# Patient Record
Sex: FEMALE | Race: WHITE | NOT HISPANIC OR LATINO | Employment: FULL TIME
[De-identification: names, ages, dates, MRNs, and addresses within clinical notes are randomized per-mention and may not be internally consistent; named-entity substitution may affect disease eponyms.]

---

## 2017-01-30 DIAGNOSIS — Z79.899 ENCOUNTER FOR LONG-TERM CURRENT USE OF MEDICATION: ICD-10-CM

## 2017-01-30 DIAGNOSIS — D84.9 IMMUNOSUPPRESSED STATUS (H): ICD-10-CM

## 2017-01-30 DIAGNOSIS — Z94.0 KIDNEY REPLACED BY TRANSPLANT: ICD-10-CM

## 2017-01-30 DIAGNOSIS — Z79.60 LONG-TERM USE OF IMMUNOSUPPRESSANT MEDICATION: ICD-10-CM

## 2017-01-30 DIAGNOSIS — Z94.0 RENAL TRANSPLANT RECIPIENT: ICD-10-CM

## 2017-01-30 DIAGNOSIS — Z48.298 AFTERCARE FOLLOWING ORGAN TRANSPLANT: ICD-10-CM

## 2017-01-30 LAB
ANION GAP SERPL CALCULATED.3IONS-SCNC: 7 MMOL/L (ref 3–14)
BUN SERPL-MCNC: 15 MG/DL (ref 7–30)
CALCIUM SERPL-MCNC: 9.2 MG/DL (ref 8.5–10.1)
CHLORIDE SERPL-SCNC: 105 MMOL/L (ref 94–109)
CO2 SERPL-SCNC: 25 MMOL/L (ref 20–32)
CREAT SERPL-MCNC: 0.72 MG/DL (ref 0.52–1.04)
ERYTHROCYTE [DISTWIDTH] IN BLOOD BY AUTOMATED COUNT: 12.8 % (ref 10–15)
GFR SERPL CREATININE-BSD FRML MDRD: 82 ML/MIN/1.7M2
GLUCOSE SERPL-MCNC: 104 MG/DL (ref 70–99)
HCT VFR BLD AUTO: 40.4 % (ref 35–47)
HGB BLD-MCNC: 13.4 G/DL (ref 11.7–15.7)
MCH RBC QN AUTO: 28.2 PG (ref 26.5–33)
MCHC RBC AUTO-ENTMCNC: 33.2 G/DL (ref 31.5–36.5)
MCV RBC AUTO: 85 FL (ref 78–100)
PLATELET # BLD AUTO: 104 10E9/L (ref 150–450)
POTASSIUM SERPL-SCNC: 3.8 MMOL/L (ref 3.4–5.3)
RBC # BLD AUTO: 4.75 10E12/L (ref 3.8–5.2)
SODIUM SERPL-SCNC: 137 MMOL/L (ref 133–144)
WBC # BLD AUTO: 3.3 10E9/L (ref 4–11)

## 2017-01-30 PROCEDURE — 85027 COMPLETE CBC AUTOMATED: CPT | Performed by: INTERNAL MEDICINE

## 2017-01-30 PROCEDURE — 36415 COLL VENOUS BLD VENIPUNCTURE: CPT | Performed by: INTERNAL MEDICINE

## 2017-01-30 PROCEDURE — 80048 BASIC METABOLIC PNL TOTAL CA: CPT | Performed by: INTERNAL MEDICINE

## 2017-01-30 PROCEDURE — 80197 ASSAY OF TACROLIMUS: CPT | Performed by: INTERNAL MEDICINE

## 2017-01-31 LAB
TACROLIMUS BLD-MCNC: 4.6 UG/L (ref 5–15)
TME LAST DOSE: ABNORMAL H

## 2017-03-08 DIAGNOSIS — Z94.0 RENAL TRANSPLANT RECIPIENT: ICD-10-CM

## 2017-03-08 DIAGNOSIS — Z94.0 KIDNEY REPLACED BY TRANSPLANT: ICD-10-CM

## 2017-03-08 DIAGNOSIS — Z48.298 AFTERCARE FOLLOWING ORGAN TRANSPLANT: ICD-10-CM

## 2017-03-08 DIAGNOSIS — Z79.60 LONG-TERM USE OF IMMUNOSUPPRESSANT MEDICATION: ICD-10-CM

## 2017-03-08 DIAGNOSIS — D84.9 IMMUNOSUPPRESSED STATUS (H): ICD-10-CM

## 2017-03-08 DIAGNOSIS — Z79.899 ENCOUNTER FOR LONG-TERM CURRENT USE OF MEDICATION: ICD-10-CM

## 2017-03-08 LAB
ANION GAP SERPL CALCULATED.3IONS-SCNC: 7 MMOL/L (ref 3–14)
BUN SERPL-MCNC: 14 MG/DL (ref 7–30)
CALCIUM SERPL-MCNC: 9.3 MG/DL (ref 8.5–10.1)
CHLORIDE SERPL-SCNC: 104 MMOL/L (ref 94–109)
CO2 SERPL-SCNC: 30 MMOL/L (ref 20–32)
CREAT SERPL-MCNC: 0.81 MG/DL (ref 0.52–1.04)
ERYTHROCYTE [DISTWIDTH] IN BLOOD BY AUTOMATED COUNT: 13.2 % (ref 10–15)
GFR SERPL CREATININE-BSD FRML MDRD: 71 ML/MIN/1.7M2
GLUCOSE SERPL-MCNC: 115 MG/DL (ref 70–99)
HCT VFR BLD AUTO: 43.6 % (ref 35–47)
HGB BLD-MCNC: 14.3 G/DL (ref 11.7–15.7)
MCH RBC QN AUTO: 28.3 PG (ref 26.5–33)
MCHC RBC AUTO-ENTMCNC: 32.8 G/DL (ref 31.5–36.5)
MCV RBC AUTO: 86 FL (ref 78–100)
PLATELET # BLD AUTO: 108 10E9/L (ref 150–450)
POTASSIUM SERPL-SCNC: 4.2 MMOL/L (ref 3.4–5.3)
RBC # BLD AUTO: 5.06 10E12/L (ref 3.8–5.2)
SODIUM SERPL-SCNC: 141 MMOL/L (ref 133–144)
TACROLIMUS BLD-MCNC: 5.2 UG/L (ref 5–15)
TME LAST DOSE: NORMAL H
WBC # BLD AUTO: 3.6 10E9/L (ref 4–11)

## 2017-03-08 PROCEDURE — 36415 COLL VENOUS BLD VENIPUNCTURE: CPT | Performed by: INTERNAL MEDICINE

## 2017-03-08 PROCEDURE — 80048 BASIC METABOLIC PNL TOTAL CA: CPT | Performed by: INTERNAL MEDICINE

## 2017-03-08 PROCEDURE — 80197 ASSAY OF TACROLIMUS: CPT | Performed by: INTERNAL MEDICINE

## 2017-03-08 PROCEDURE — 85027 COMPLETE CBC AUTOMATED: CPT | Performed by: INTERNAL MEDICINE

## 2017-06-03 ENCOUNTER — HEALTH MAINTENANCE LETTER (OUTPATIENT)
Age: 62
End: 2017-06-03

## 2017-06-12 DIAGNOSIS — Z48.298 AFTERCARE FOLLOWING ORGAN TRANSPLANT: ICD-10-CM

## 2017-06-12 DIAGNOSIS — D84.9 IMMUNOSUPPRESSED STATUS (H): ICD-10-CM

## 2017-06-12 DIAGNOSIS — Z79.60 LONG-TERM USE OF IMMUNOSUPPRESSANT MEDICATION: ICD-10-CM

## 2017-06-12 DIAGNOSIS — Z94.0 KIDNEY REPLACED BY TRANSPLANT: ICD-10-CM

## 2017-06-12 DIAGNOSIS — Z79.899 ENCOUNTER FOR LONG-TERM CURRENT USE OF MEDICATION: ICD-10-CM

## 2017-06-12 DIAGNOSIS — Z94.0 RENAL TRANSPLANT RECIPIENT: ICD-10-CM

## 2017-06-12 LAB
ANION GAP SERPL CALCULATED.3IONS-SCNC: 6 MMOL/L (ref 3–14)
BUN SERPL-MCNC: 15 MG/DL (ref 7–30)
CALCIUM SERPL-MCNC: 8.8 MG/DL (ref 8.5–10.1)
CHLORIDE SERPL-SCNC: 106 MMOL/L (ref 94–109)
CO2 SERPL-SCNC: 27 MMOL/L (ref 20–32)
CREAT SERPL-MCNC: 0.71 MG/DL (ref 0.52–1.04)
CREAT UR-MCNC: 67 MG/DL
ERYTHROCYTE [DISTWIDTH] IN BLOOD BY AUTOMATED COUNT: 13 % (ref 10–15)
GFR SERPL CREATININE-BSD FRML MDRD: 84 ML/MIN/1.7M2
GLUCOSE SERPL-MCNC: 97 MG/DL (ref 70–99)
HCT VFR BLD AUTO: 41.4 % (ref 35–47)
HGB BLD-MCNC: 13.7 G/DL (ref 11.7–15.7)
MCH RBC QN AUTO: 28.6 PG (ref 26.5–33)
MCHC RBC AUTO-ENTMCNC: 33.1 G/DL (ref 31.5–36.5)
MCV RBC AUTO: 86 FL (ref 78–100)
PLATELET # BLD AUTO: 100 10E9/L (ref 150–450)
POTASSIUM SERPL-SCNC: 3.9 MMOL/L (ref 3.4–5.3)
PROT UR-MCNC: 0.1 G/L
PROT/CREAT 24H UR: 0.15 G/G CR (ref 0–0.2)
RBC # BLD AUTO: 4.79 10E12/L (ref 3.8–5.2)
SODIUM SERPL-SCNC: 139 MMOL/L (ref 133–144)
TACROLIMUS BLD-MCNC: 4 UG/L (ref 5–15)
TME LAST DOSE: ABNORMAL H
WBC # BLD AUTO: 3 10E9/L (ref 4–11)

## 2017-06-12 PROCEDURE — 80048 BASIC METABOLIC PNL TOTAL CA: CPT | Performed by: INTERNAL MEDICINE

## 2017-06-12 PROCEDURE — 80197 ASSAY OF TACROLIMUS: CPT | Performed by: INTERNAL MEDICINE

## 2017-06-12 PROCEDURE — 85027 COMPLETE CBC AUTOMATED: CPT | Performed by: INTERNAL MEDICINE

## 2017-06-12 PROCEDURE — 84156 ASSAY OF PROTEIN URINE: CPT | Performed by: INTERNAL MEDICINE

## 2017-06-12 PROCEDURE — 36415 COLL VENOUS BLD VENIPUNCTURE: CPT | Performed by: INTERNAL MEDICINE

## 2017-06-16 DIAGNOSIS — I10 ESSENTIAL HYPERTENSION WITH GOAL BLOOD PRESSURE LESS THAN 130/80: ICD-10-CM

## 2017-06-16 RX ORDER — AMLODIPINE BESYLATE 5 MG/1
5 TABLET ORAL DAILY
Qty: 90 TABLET | Refills: 3 | Status: SHIPPED | OUTPATIENT
Start: 2017-06-16 | End: 2018-06-20

## 2017-06-16 NOTE — TELEPHONE ENCOUNTER
Last Office Visit with Nephrologist:  9/29/16.  Medication refilled per Nephrology Clinic protocol.     Taryn Page RN

## 2017-08-14 ENCOUNTER — TELEPHONE (OUTPATIENT)
Dept: NEPHROLOGY | Facility: CLINIC | Age: 62
End: 2017-08-14

## 2017-08-14 NOTE — TELEPHONE ENCOUNTER
Spoke with patient. Said she's been great since last appointment, denied any symptoms or concerns. She will have her labs drawn the day before her appointment.    Taryn Page RN

## 2017-08-21 DIAGNOSIS — Z94.0 KIDNEY REPLACED BY TRANSPLANT: ICD-10-CM

## 2017-08-21 DIAGNOSIS — D84.9 IMMUNOSUPPRESSED STATUS (H): ICD-10-CM

## 2017-08-21 DIAGNOSIS — Z48.298 AFTERCARE FOLLOWING ORGAN TRANSPLANT: ICD-10-CM

## 2017-08-21 DIAGNOSIS — Z79.60 LONG-TERM USE OF IMMUNOSUPPRESSANT MEDICATION: ICD-10-CM

## 2017-08-21 DIAGNOSIS — Z79.899 ENCOUNTER FOR LONG-TERM CURRENT USE OF MEDICATION: ICD-10-CM

## 2017-08-21 DIAGNOSIS — Z94.0 RENAL TRANSPLANT RECIPIENT: ICD-10-CM

## 2017-08-21 LAB
ANION GAP SERPL CALCULATED.3IONS-SCNC: 2 MMOL/L (ref 3–14)
BUN SERPL-MCNC: 14 MG/DL (ref 7–30)
CALCIUM SERPL-MCNC: 9.2 MG/DL (ref 8.5–10.1)
CHLORIDE SERPL-SCNC: 109 MMOL/L (ref 94–109)
CO2 SERPL-SCNC: 31 MMOL/L (ref 20–32)
CREAT SERPL-MCNC: 0.72 MG/DL (ref 0.52–1.04)
ERYTHROCYTE [DISTWIDTH] IN BLOOD BY AUTOMATED COUNT: 12.9 % (ref 10–15)
GFR SERPL CREATININE-BSD FRML MDRD: 82 ML/MIN/1.7M2
GLUCOSE SERPL-MCNC: 89 MG/DL (ref 70–99)
HCT VFR BLD AUTO: 39.8 % (ref 35–47)
HGB BLD-MCNC: 13.3 G/DL (ref 11.7–15.7)
MCH RBC QN AUTO: 28.8 PG (ref 26.5–33)
MCHC RBC AUTO-ENTMCNC: 33.4 G/DL (ref 31.5–36.5)
MCV RBC AUTO: 86 FL (ref 78–100)
PLATELET # BLD AUTO: 85 10E9/L (ref 150–450)
POTASSIUM SERPL-SCNC: 4.3 MMOL/L (ref 3.4–5.3)
RBC # BLD AUTO: 4.62 10E12/L (ref 3.8–5.2)
SODIUM SERPL-SCNC: 142 MMOL/L (ref 133–144)
TACROLIMUS BLD-MCNC: 3.9 UG/L (ref 5–15)
TME LAST DOSE: ABNORMAL H
WBC # BLD AUTO: 3.3 10E9/L (ref 4–11)

## 2017-08-21 PROCEDURE — 85027 COMPLETE CBC AUTOMATED: CPT | Performed by: INTERNAL MEDICINE

## 2017-08-21 PROCEDURE — 80197 ASSAY OF TACROLIMUS: CPT | Performed by: INTERNAL MEDICINE

## 2017-08-21 PROCEDURE — 80048 BASIC METABOLIC PNL TOTAL CA: CPT | Performed by: INTERNAL MEDICINE

## 2017-08-21 PROCEDURE — 36415 COLL VENOUS BLD VENIPUNCTURE: CPT | Performed by: INTERNAL MEDICINE

## 2017-08-22 ENCOUNTER — OFFICE VISIT (OUTPATIENT)
Dept: NEPHROLOGY | Facility: CLINIC | Age: 62
End: 2017-08-22
Attending: INTERNAL MEDICINE
Payer: COMMERCIAL

## 2017-08-22 ENCOUNTER — RECORDS - HEALTHEAST (OUTPATIENT)
Dept: ADMINISTRATIVE | Facility: OTHER | Age: 62
End: 2017-08-22

## 2017-08-22 VITALS
SYSTOLIC BLOOD PRESSURE: 145 MMHG | RESPIRATION RATE: 18 BRPM | DIASTOLIC BLOOD PRESSURE: 82 MMHG | WEIGHT: 126.8 LBS | HEART RATE: 85 BPM | HEIGHT: 60 IN | BODY MASS INDEX: 24.9 KG/M2 | TEMPERATURE: 98.3 F

## 2017-08-22 DIAGNOSIS — Z94.0 KIDNEY REPLACED BY TRANSPLANT: Primary | ICD-10-CM

## 2017-08-22 DIAGNOSIS — I15.1 HYPERTENSION SECONDARY TO OTHER RENAL DISORDERS: ICD-10-CM

## 2017-08-22 DIAGNOSIS — E55.9 VITAMIN D DEFICIENCY: ICD-10-CM

## 2017-08-22 DIAGNOSIS — Z79.60 LONG-TERM USE OF IMMUNOSUPPRESSANT MEDICATION: ICD-10-CM

## 2017-08-22 DIAGNOSIS — Z48.298 AFTERCARE FOLLOWING ORGAN TRANSPLANT: ICD-10-CM

## 2017-08-22 DIAGNOSIS — D84.9 IMMUNOSUPPRESSED STATUS (H): ICD-10-CM

## 2017-08-22 DIAGNOSIS — D69.6 THROMBOCYTOPENIA (H): ICD-10-CM

## 2017-08-22 DIAGNOSIS — Z94.0 KIDNEY TRANSPLANT RECIPIENT: ICD-10-CM

## 2017-08-22 PROCEDURE — 99212 OFFICE O/P EST SF 10 MIN: CPT | Mod: ZF

## 2017-08-22 ASSESSMENT — PAIN SCALES - GENERAL: PAINLEVEL: NO PAIN (0)

## 2017-08-22 NOTE — TELEPHONE ENCOUNTER
Tac Level 3.9      Goal 4-6  Please phone patient and ask if Tacrolimus level was a good 12 hour trough.  If so, Increase Tacrolimus from 1.0 mg to 1.5 mg Bid  Repeat labs in 1-2 weeks.

## 2017-08-22 NOTE — TELEPHONE ENCOUNTER
Call placed to patient: No answer. Detailed message left requesting a return call to discuss understanding of instructions listed below. Will try back.

## 2017-08-22 NOTE — LETTER
8/22/2017      RE: Maritza Navarro  [address] [address]       Assessment and Plan:  1. LDKT - baseline Cr ~ 0.8-0.9, which has remained stable to improved with latest labs.  Normal proteinuria.  Low level DSA to Cw which is not felt to be very antigenic.  Will make no changer in immunosuppression.  2. HTN - well controlled at target of less than 140/90.  No changes.  3. PKD - has not had any issues or recent pain symptoms.  4. Thrombocytopenia - stable platelet count generally in 90-110s.  5. Vitamin D deficiency - previously slightly low vitamin D level and will continue on cholecalciferol.  Will recheck vitamin D level with next labs.  6. Alopecia - stable symptoms and isn't interested in making any immunosuppression changes at this time.  7. Skin cancer risk - no new skin lesions.  Recommend regular follow up with Dermatology.  8. Recommend return visit in 12 months.    Assessment and plan was discussed with patient and she voiced her understanding and agreement.    Reason for Visit:  Ms. Navarro is here for routine follow up.    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         Transplant Hx:       Tx: LDKT  Date: 9/29/15       Present Maintenance IS: Tacrolimus and Mycophenolate mofetil       Baseline Creatinine: 0.8-0.9       Recent DSA: Yes  Date last checked: 6/2016       Biopsy: No    Ms. Naavrro reports feeling good overall with minimal medical complaints.  Her energy level is good and pretty much normal.  She is active and does get some exercise, mostly with walking.  Denies any chest pain, but rare shortness of breath with exertion, mostly with climbing stairs.  Previous stress test reportedly okay.  Appetite is good and her weight has been stable.  No nausea, vomiting or diarrhea.  No fever, sweats or chills.  No leg swelling.  Her previous hair loss isn't any better, but also not any worse lately.    Home BP: 120/70s.      ROS:    A comprehensive review of systems was obtained and negative, except as noted in the HPI or PMH.    Active Medical Problems:  Patient Active Problem List   Diagnosis     Polycystic kidney disease     Hypertension secondary to other renal disorders     Vitamin D deficiency     Dyslipidemia     Polycystic liver disease     Kidney replaced by transplant     Immunosuppressed status (H)     Thrombocytopenia (H)     Steroid-induced hyperglycemia     Aftercare following organ transplant       Personal Hx:  Social History     Social History     Marital status:      Spouse name: N/A     Number of children: N/A     Years of education: N/A     Occupational History     Not on file.     Social History Main Topics     Smoking status: Never Smoker     Smokeless tobacco: Never Used     Alcohol use 0.5 oz/week     1 Standard drinks or equivalent per week     Drug use: No     Sexual activity: Not on file     Other Topics Concern     Not on file     Social History Narrative       Allergies:  Allergies   Allergen Reactions     Contrast Dye Unknown     Mold Unknown       Medications:  Prior to Admission medications    Medication Sig Start Date End Date Taking? Authorizing Provider   amLODIPine (NORVASC) 5 MG tablet Take 1 tablet (5 mg) by mouth daily 6/16/17  Yes Maxim Brooke MD   tacrolimus (PROGRAF - GENERIC EQUIVALENT) 1 MG capsule Take 1 capsule (1 mg) by mouth 2 times daily 10/18/16  Yes Anthony Hoskins MD   sulfamethoxazole-trimethoprim (BACTRIM,SEPTRA) 400-80 MG per tablet Take 1 tablet by mouth daily 10/3/16  Yes Alissa Bolden MD   mycophenolate (CELLCEPT - GENERIC EQUIVALENT) 250 MG capsule Take 4 capsules (1,000 mg) by mouth 2 times daily 9/29/16  Yes Alissa Bolden MD   aspirin 81 MG tablet Take 1 tablet (81 mg) by mouth daily 9/29/16  Yes Alissa Bolden MD   Vitamin D, Cholecalciferol, 1000 UNITS TABS Take 1,000 Units by mouth daily 10/8/15  Yes Paola Murphy MD   tacrolimus (PROGRAF -  "GENERIC EQUIVALENT) 0.5 MG capsule HOLD  Patient not taking: Reported on 8/22/2017 10/18/16   Anthony Hoskins MD       Vitals:  /82  Pulse 85  Temp 98.3  F (36.8  C) (Oral)  Resp 18  Ht 1.53 m (5' 0.25\")  Wt 57.5 kg (126 lb 12.8 oz)  BMI 24.56 kg/m2    Exam:   GENERAL APPEARANCE: alert and no distress  HENT: mouth without ulcers or lesions  LYMPHATICS: no cervical or supraclavicular nodes  RESP: lungs clear to auscultation - no rales, rhonchi or wheezes  CV: regular rhythm, normal rate, no rub, no murmur  EDEMA: no LE edema bilaterally  ABDOMEN: soft, nondistended, nontender, bowel sounds normal  MS: extremities normal - no gross deformities noted, no evidence of inflammation in joints, no muscle tenderness  SKIN: no rash  TX KIDNEY: normal    Results:   Recent Results (from the past 168 hour(s))   Tacrolimus level    Collection Time: 08/21/17 10:00 AM   Result Value Ref Range    Tacrolimus Last Dose 2130 8/20/17     Tacrolimus Level 3.9 (L) 5.0 - 15.0 ug/L   CBC with platelets    Collection Time: 08/21/17 10:29 AM   Result Value Ref Range    WBC 3.3 (L) 4.0 - 11.0 10e9/L    RBC Count 4.62 3.8 - 5.2 10e12/L    Hemoglobin 13.3 11.7 - 15.7 g/dL    Hematocrit 39.8 35.0 - 47.0 %    MCV 86 78 - 100 fl    MCH 28.8 26.5 - 33.0 pg    MCHC 33.4 31.5 - 36.5 g/dL    RDW 12.9 10.0 - 15.0 %    Platelet Count 85 (L) 150 - 450 10e9/L   Basic metabolic panel    Collection Time: 08/21/17 10:29 AM   Result Value Ref Range    Sodium 142 133 - 144 mmol/L    Potassium 4.3 3.4 - 5.3 mmol/L    Chloride 109 94 - 109 mmol/L    Carbon Dioxide 31 20 - 32 mmol/L    Anion Gap 2 (L) 3 - 14 mmol/L    Glucose 89 70 - 99 mg/dL    Urea Nitrogen 14 7 - 30 mg/dL    Creatinine 0.72 0.52 - 1.04 mg/dL    GFR Estimate 82 >60 mL/min/1.7m2    GFR Estimate If Black >90 >60 mL/min/1.7m2    Calcium 9.2 8.5 - 10.1 mg/dL         Maxim Brooke MD      "

## 2017-08-22 NOTE — NURSING NOTE
"Chief Complaint   Patient presents with     RECHECK     Kidney follow up       Initial /82  Pulse 85  Temp 98.3  F (36.8  C) (Oral)  Resp 18  Ht 1.53 m (5' 0.25\")  Wt 57.5 kg (126 lb 12.8 oz)  BMI 24.56 kg/m2 Estimated body mass index is 24.56 kg/(m^2) as calculated from the following:    Height as of this encounter: 1.53 m (5' 0.25\").    Weight as of this encounter: 57.5 kg (126 lb 12.8 oz).  Medication Reconciliation: complete   FRANCISCA MONTELONGO CMA      "

## 2017-08-22 NOTE — MR AVS SNAPSHOT
After Visit Summary   8/22/2017    Maritza Navarro    MRN: [id number]           Patient Information     Date Of Birth          1955        Visit Information        Provider Department      8/22/2017 4:00 PM Maxim Brooke MD [facility] Nephrology        Today's Diagnoses     Kidney replaced by transplant    -  1    Vitamin D deficiency        Immunosuppressed status (H)        Aftercare following organ transplant        Thrombocytopenia (H)        Hypertension secondary to other renal disorders           Follow-ups after your visit        Additional Services     DERMATOLOGY REFERRAL       Your provider has referred you to: [facility]: [facility] [phone]   [email or web address]    Please be aware that coverage of these services is subject to the terms and limitations of your health insurance plan.  Call member services at your health plan with any benefit or coverage questions.      Please bring the following with you to your appointment:    (1) Any X-Rays, CTs or MRIs which have been performed.  Contact the facility where they were done to arrange for  prior to your scheduled appointment.    (2) List of current medications  (3) This referral request   (4) Any documents/labs given to you for this referral                  Follow-up notes from your care team     Return in about 1 year (around 8/22/2018).      Future tests that were ordered for you today     Open Future Orders        Priority Expected Expires Ordered    Vitamin D Deficiency Routine  9/21/2017 8/22/2017            Who to contact     If you have questions or need follow up information about today's clinic visit or your schedule please contact [facility] NEPHROLOGY directly at [phone].  Normal or non-critical lab and imaging results will be communicated to you by MyChart, letter or phone within 4 business days after the clinic has received the results. If you do not hear  "from us within 7 days, please contact the clinic through [employer] or phone. If you have a critical or abnormal lab result, we will notify you by phone as soon as possible.  Submit refill requests through [employer] or call your pharmacy and they will forward the refill request to us. Please allow 3 business days for your refill to be completed.          Additional Information About Your Visit        [employer] Information     [employer] gives you secure access to your electronic health record. If you see a primary care provider, you can also send messages to your care team and make appointments. If you have questions, please call your primary care clinic.  If you do not have a primary care provider, please call [phone] and they will assist you.        Care EveryWhere ID     This is your Care EveryWhere ID. This could be used by other organizations to access your [facility] medical records  [id number]        Your Vitals Were     Pulse Temperature Respirations Height BMI (Body Mass Index)       85 98.3  F (36.8  C) (Oral) 18 1.53 m (5' 0.25\") 24.56 kg/m2        Blood Pressure from Last 3 Encounters:   08/22/17 145/82   09/29/16 122/86   03/17/16 117/77    Weight from Last 3 Encounters:   08/22/17 57.5 kg (126 lb 12.8 oz)   09/29/16 56.7 kg (125 lb)   03/17/16 55.6 kg (122 lb 9.6 oz)              We Performed the Following     DERMATOLOGY REFERRAL        Primary Care Provider Office Phone # Fax #    Kathleen ALBRECHT Shahid [phone]       [facility] [address] [address]        Equal Access to Services     [facility]: Hadii aad ku hadasho Soomaali, waaxda luqadaha, qaybta kaalmada adeegjay, maine warner . So [facility] [phone].    ATENCIÓN: Si habla español, tiene a lucas disposición servicios gratuitos de asistencia lingüística. Llame al [phone].    We comply with applicable federal civil rights laws and [place] laws. We do not discriminate on the " basis of race, color, national origin, age, disability sex, sexual orientation or gender identity.            Thank you!     Thank you for choosing [facility] NEPHROLOGY  for your care. Our goal is always to provide you with excellent care. Hearing back from our patients is one way we can continue to improve our services. Please take a few minutes to complete the written survey that you may receive in the mail after your visit with us. Thank you!             Your Updated Medication List - Protect others around you: Learn how to safely use, store and throw away your medicines at www.disposemymeds.org.          This list is accurate as of: 8/22/17  4:16 PM.  Always use your most recent med list.                   Brand Name Dispense Instructions for use Diagnosis    amLODIPine 5 MG tablet    NORVASC    90 tablet    Take 1 tablet (5 mg) by mouth daily    Essential hypertension with goal blood pressure less than 130/80       aspirin 81 MG tablet     30 tablet    Take 1 tablet (81 mg) by mouth daily    Kidney replaced by transplant       mycophenolate 250 MG capsule    GENERIC EQUIVALENT    240 capsule    Take 4 capsules (1,000 mg) by mouth 2 times daily    CKD (chronic kidney disease) stage 4, GFR 15-29 ml/min (H)       sulfamethoxazole-trimethoprim 400-80 MG per tablet    BACTRIM/SEPTRA    90 tablet    Take 1 tablet by mouth daily    CKD (chronic kidney disease) stage 4, GFR 15-29 ml/min (H)       * tacrolimus 0.5 MG capsule    GENERIC EQUIVALENT    60 capsule    HOLD    Kidney transplant recipient, Long-term use of immunosuppressant medication       * tacrolimus 1 MG capsule    GENERIC EQUIVALENT    60 capsule    Take 1 capsule (1 mg) by mouth 2 times daily    Kidney transplant recipient, Long-term use of immunosuppressant medication       Vitamin D (Cholecalciferol) 1000 UNITS Tabs     90 tablet    Take 1,000 Units by mouth daily    Secondary renal hyperparathyroidism (H), Kidney replaced by transplant       * Notice:   This list has 2 medication(s) that are the same as other medications prescribed for you. Read the directions carefully, and ask your doctor or other care provider to review them with you.

## 2017-08-22 NOTE — PROGRESS NOTES
Assessment and Plan:  1. LDKT - baseline Cr ~ 0.8-0.9, which has remained stable to improved with latest labs.  Normal proteinuria.  Low level DSA to Cw which is not felt to be very antigenic.  Will make no changer in immunosuppression.  2. HTN - well controlled at target of less than 140/90.  No changes.  3. PKD - has not had any issues or recent pain symptoms.  4. Thrombocytopenia - stable platelet count generally in 90-110s.  5. Vitamin D deficiency - previously slightly low vitamin D level and will continue on cholecalciferol.  Will recheck vitamin D level with next labs.  6. Alopecia - stable symptoms and isn't interested in making any immunosuppression changes at this time.  7. Skin cancer risk - no new skin lesions.  Recommend regular follow up with Dermatology.  8. Recommend return visit in 12 months.    Assessment and plan was discussed with patient and she voiced her understanding and agreement.    Reason for Visit:  Ms. Navarro is here for routine follow up.    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         Transplant Hx:       Tx: LDKT  Date: 9/29/15       Present Maintenance IS: Tacrolimus and Mycophenolate mofetil       Baseline Creatinine: 0.8-0.9       Recent DSA: Yes  Date last checked: 6/2016       Biopsy: No    Ms. Navarro reports feeling good overall with minimal medical complaints.  Her energy level is good and pretty much normal.  She is active and does get some exercise, mostly with walking.  Denies any chest pain, but rare shortness of breath with exertion, mostly with climbing stairs.  Previous stress test reportedly okay.  Appetite is good and her weight has been stable.  No nausea, vomiting or diarrhea.  No fever, sweats or chills.  No leg swelling.  Her previous hair loss isn't any better, but also not any worse lately.    Home BP: 120/70s.      ROS:   A comprehensive review of systems was obtained and negative, except as noted in  the HPI or PMH.    Active Medical Problems:  Patient Active Problem List   Diagnosis     Polycystic kidney disease     Hypertension secondary to other renal disorders     Vitamin D deficiency     Dyslipidemia     Polycystic liver disease     Kidney replaced by transplant     Immunosuppressed status (H)     Thrombocytopenia (H)     Steroid-induced hyperglycemia     Aftercare following organ transplant       Personal Hx:  Social History     Social History     Marital status:      Spouse name: N/A     Number of children: N/A     Years of education: N/A     Occupational History     Not on file.     Social History Main Topics     Smoking status: Never Smoker     Smokeless tobacco: Never Used     Alcohol use 0.5 oz/week     1 Standard drinks or equivalent per week     Drug use: No     Sexual activity: Not on file     Other Topics Concern     Not on file     Social History Narrative       Allergies:  Allergies   Allergen Reactions     Contrast Dye Unknown     Mold Unknown       Medications:  Prior to Admission medications    Medication Sig Start Date End Date Taking? Authorizing Provider   amLODIPine (NORVASC) 5 MG tablet Take 1 tablet (5 mg) by mouth daily 6/16/17  Yes Maxim Brooke MD   tacrolimus (PROGRAF - GENERIC EQUIVALENT) 1 MG capsule Take 1 capsule (1 mg) by mouth 2 times daily 10/18/16  Yes Anthony Hoskins MD   sulfamethoxazole-trimethoprim (BACTRIM,SEPTRA) 400-80 MG per tablet Take 1 tablet by mouth daily 10/3/16  Yes Alissa Bolden MD   mycophenolate (CELLCEPT - GENERIC EQUIVALENT) 250 MG capsule Take 4 capsules (1,000 mg) by mouth 2 times daily 9/29/16  Yes Alissa Bolden MD   aspirin 81 MG tablet Take 1 tablet (81 mg) by mouth daily 9/29/16  Yes Alissa Bolden MD   Vitamin D, Cholecalciferol, 1000 UNITS TABS Take 1,000 Units by mouth daily 10/8/15  Yes Paola Murphy MD   tacrolimus (PROGRAF - GENERIC EQUIVALENT) 0.5 MG capsule HOLD  Patient not taking: Reported on  "8/22/2017 10/18/16   Anthony Hoskins MD       Vitals:  /82  Pulse 85  Temp 98.3  F (36.8  C) (Oral)  Resp 18  Ht 1.53 m (5' 0.25\")  Wt 57.5 kg (126 lb 12.8 oz)  BMI 24.56 kg/m2    Exam:   GENERAL APPEARANCE: alert and no distress  HENT: mouth without ulcers or lesions  LYMPHATICS: no cervical or supraclavicular nodes  RESP: lungs clear to auscultation - no rales, rhonchi or wheezes  CV: regular rhythm, normal rate, no rub, no murmur  EDEMA: no LE edema bilaterally  ABDOMEN: soft, nondistended, nontender, bowel sounds normal  MS: extremities normal - no gross deformities noted, no evidence of inflammation in joints, no muscle tenderness  SKIN: no rash  TX KIDNEY: normal    Results:   Recent Results (from the past 168 hour(s))   Tacrolimus level    Collection Time: 08/21/17 10:00 AM   Result Value Ref Range    Tacrolimus Last Dose 2130 8/20/17     Tacrolimus Level 3.9 (L) 5.0 - 15.0 ug/L   CBC with platelets    Collection Time: 08/21/17 10:29 AM   Result Value Ref Range    WBC 3.3 (L) 4.0 - 11.0 10e9/L    RBC Count 4.62 3.8 - 5.2 10e12/L    Hemoglobin 13.3 11.7 - 15.7 g/dL    Hematocrit 39.8 35.0 - 47.0 %    MCV 86 78 - 100 fl    MCH 28.8 26.5 - 33.0 pg    MCHC 33.4 31.5 - 36.5 g/dL    RDW 12.9 10.0 - 15.0 %    Platelet Count 85 (L) 150 - 450 10e9/L   Basic metabolic panel    Collection Time: 08/21/17 10:29 AM   Result Value Ref Range    Sodium 142 133 - 144 mmol/L    Potassium 4.3 3.4 - 5.3 mmol/L    Chloride 109 94 - 109 mmol/L    Carbon Dioxide 31 20 - 32 mmol/L    Anion Gap 2 (L) 3 - 14 mmol/L    Glucose 89 70 - 99 mg/dL    Urea Nitrogen 14 7 - 30 mg/dL    Creatinine 0.72 0.52 - 1.04 mg/dL    GFR Estimate 82 >60 mL/min/1.7m2    GFR Estimate If Black >90 >60 mL/min/1.7m2    Calcium 9.2 8.5 - 10.1 mg/dL         "

## 2017-08-23 RX ORDER — TACROLIMUS 1 MG/1
1 CAPSULE ORAL 2 TIMES DAILY
Qty: 60 CAPSULE | Refills: 11
Start: 2017-08-23 | End: 2017-10-05

## 2017-08-23 RX ORDER — TACROLIMUS 0.5 MG/1
0.5 CAPSULE ORAL 2 TIMES DAILY
Qty: 60 CAPSULE | Refills: 11
Start: 2017-08-23 | End: 2017-10-05

## 2017-08-23 NOTE — TELEPHONE ENCOUNTER
Call placed to patient: Patients state a 13 hour lab draw and confirms current dose. Patient verbalize understanding to increase dose to 1.5 mg twice a day and repeat level in 1-2 weeks. Rx/order placed

## 2017-09-13 DIAGNOSIS — Z94.0 KIDNEY TRANSPLANTED: Primary | ICD-10-CM

## 2017-09-13 DIAGNOSIS — N18.4 CKD (CHRONIC KIDNEY DISEASE) STAGE 4, GFR 15-29 ML/MIN (H): ICD-10-CM

## 2017-09-13 RX ORDER — SULFAMETHOXAZOLE AND TRIMETHOPRIM 400; 80 MG/1; MG/1
1 TABLET ORAL DAILY
Qty: 90 TABLET | Refills: 3 | Status: SHIPPED | OUTPATIENT
Start: 2017-09-13 | End: 2018-09-04

## 2017-09-18 DIAGNOSIS — Z79.60 LONG-TERM USE OF IMMUNOSUPPRESSANT MEDICATION: ICD-10-CM

## 2017-09-18 DIAGNOSIS — Z79.899 ENCOUNTER FOR LONG-TERM CURRENT USE OF MEDICATION: ICD-10-CM

## 2017-09-18 DIAGNOSIS — Z94.0 KIDNEY REPLACED BY TRANSPLANT: ICD-10-CM

## 2017-09-18 DIAGNOSIS — D84.9 IMMUNOSUPPRESSED STATUS (H): ICD-10-CM

## 2017-09-18 DIAGNOSIS — E55.9 VITAMIN D DEFICIENCY: ICD-10-CM

## 2017-09-18 DIAGNOSIS — Z48.298 AFTERCARE FOLLOWING ORGAN TRANSPLANT: ICD-10-CM

## 2017-09-18 DIAGNOSIS — Z94.0 RENAL TRANSPLANT RECIPIENT: ICD-10-CM

## 2017-09-18 LAB
ANION GAP SERPL CALCULATED.3IONS-SCNC: 8 MMOL/L (ref 3–14)
BUN SERPL-MCNC: 15 MG/DL (ref 7–30)
CALCIUM SERPL-MCNC: 9.2 MG/DL (ref 8.5–10.1)
CHLORIDE SERPL-SCNC: 109 MMOL/L (ref 94–109)
CO2 SERPL-SCNC: 24 MMOL/L (ref 20–32)
CREAT SERPL-MCNC: 0.7 MG/DL (ref 0.52–1.04)
ERYTHROCYTE [DISTWIDTH] IN BLOOD BY AUTOMATED COUNT: 12.8 % (ref 10–15)
GFR SERPL CREATININE-BSD FRML MDRD: 84 ML/MIN/1.7M2
GLUCOSE SERPL-MCNC: 135 MG/DL (ref 70–99)
HCT VFR BLD AUTO: 39.1 % (ref 35–47)
HGB BLD-MCNC: 13.4 G/DL (ref 11.7–15.7)
MCH RBC QN AUTO: 29.2 PG (ref 26.5–33)
MCHC RBC AUTO-ENTMCNC: 34.3 G/DL (ref 31.5–36.5)
MCV RBC AUTO: 85 FL (ref 78–100)
PLATELET # BLD AUTO: 87 10E9/L (ref 150–450)
POTASSIUM SERPL-SCNC: 3.6 MMOL/L (ref 3.4–5.3)
RBC # BLD AUTO: 4.59 10E12/L (ref 3.8–5.2)
SODIUM SERPL-SCNC: 141 MMOL/L (ref 133–144)
WBC # BLD AUTO: 2.8 10E9/L (ref 4–11)

## 2017-09-18 PROCEDURE — 80197 ASSAY OF TACROLIMUS: CPT | Performed by: INTERNAL MEDICINE

## 2017-09-18 PROCEDURE — 85027 COMPLETE CBC AUTOMATED: CPT | Performed by: INTERNAL MEDICINE

## 2017-09-18 PROCEDURE — 82306 VITAMIN D 25 HYDROXY: CPT | Performed by: INTERNAL MEDICINE

## 2017-09-18 PROCEDURE — 36415 COLL VENOUS BLD VENIPUNCTURE: CPT | Performed by: INTERNAL MEDICINE

## 2017-09-18 PROCEDURE — 80048 BASIC METABOLIC PNL TOTAL CA: CPT | Performed by: INTERNAL MEDICINE

## 2017-09-19 LAB
DEPRECATED CALCIDIOL+CALCIFEROL SERPL-MC: 34 UG/L (ref 20–75)
TACROLIMUS BLD-MCNC: 5.9 UG/L (ref 5–15)
TME LAST DOSE: NORMAL H

## 2017-10-05 DIAGNOSIS — Z79.60 LONG-TERM USE OF IMMUNOSUPPRESSANT MEDICATION: ICD-10-CM

## 2017-10-05 DIAGNOSIS — Z94.0 KIDNEY TRANSPLANT RECIPIENT: ICD-10-CM

## 2017-10-05 DIAGNOSIS — N18.4 CKD (CHRONIC KIDNEY DISEASE) STAGE 4, GFR 15-29 ML/MIN (H): ICD-10-CM

## 2017-10-05 RX ORDER — MYCOPHENOLATE MOFETIL 250 MG/1
1000 CAPSULE ORAL 2 TIMES DAILY
Qty: 240 CAPSULE | Refills: 11 | Status: SHIPPED | OUTPATIENT
Start: 2017-10-05 | End: 2018-09-04

## 2017-10-05 RX ORDER — TACROLIMUS 0.5 MG/1
0.5 CAPSULE ORAL 2 TIMES DAILY
Qty: 60 CAPSULE | Refills: 11 | Status: SHIPPED | OUTPATIENT
Start: 2017-10-05 | End: 2018-06-01

## 2017-10-05 RX ORDER — TACROLIMUS 1 MG/1
1 CAPSULE ORAL 2 TIMES DAILY
Qty: 60 CAPSULE | Refills: 11 | Status: SHIPPED | OUTPATIENT
Start: 2017-10-05 | End: 2017-10-09

## 2017-10-09 DIAGNOSIS — Z79.60 LONG-TERM USE OF IMMUNOSUPPRESSANT MEDICATION: ICD-10-CM

## 2017-10-09 DIAGNOSIS — Z94.0 KIDNEY TRANSPLANT RECIPIENT: ICD-10-CM

## 2017-10-09 RX ORDER — TACROLIMUS 1 MG/1
1 CAPSULE ORAL 2 TIMES DAILY
Qty: 60 CAPSULE | Refills: 11 | Status: SHIPPED | OUTPATIENT
Start: 2017-10-09 | End: 2018-06-01

## 2017-10-11 ENCOUNTER — TELEPHONE (OUTPATIENT)
Dept: TRANSPLANT | Facility: CLINIC | Age: 62
End: 2017-10-11

## 2017-10-11 NOTE — TELEPHONE ENCOUNTER
Called [facility] and they did not receive the tacrolimus 1 mg capsule that was sent via escribe on 10-09-17. Gave [facility] pharmacist a verbal rx. Left message for patient explaining this was completed.

## 2017-12-18 ENCOUNTER — TELEPHONE (OUTPATIENT)
Dept: TRANSPLANT | Facility: CLINIC | Age: 62
End: 2017-12-18

## 2017-12-18 DIAGNOSIS — Z48.298 AFTERCARE FOLLOWING ORGAN TRANSPLANT: ICD-10-CM

## 2017-12-18 DIAGNOSIS — Z94.0 KIDNEY REPLACED BY TRANSPLANT: Primary | ICD-10-CM

## 2017-12-18 DIAGNOSIS — Z79.899 ENCOUNTER FOR LONG-TERM CURRENT USE OF MEDICATION: ICD-10-CM

## 2017-12-18 NOTE — LETTER
[facility]  Room 2[address]200  [facility],  [address]  [address]  Tel [phone]  Toll Free [phone]                OUTPATIENT LABORATORY TEST ORDER    Patient Name: Maritza Navarro  Transplant Date: 9/29/2015 (Kidney)  YOB: 1955  Issue Date & Time:December 20, 20171:47 PM    [facility] MR:  [id number]  Exp. Date (1 year after date issued)      Diagnoses: Kidney Transplant (ICD-10  Z94.0)   Long term use of medications (ICD-10  Z79.899)     Lab results to be available on the same day drawn.   Patient should release information to the [facility], [facility].  Please fax to the Transplant Center at [phone].    Monthly    ?Hemogram and Platelet   ?Basic Metabolic Panel (Sodium, Potassium, Chloride, CO2, Creatinine, Urea Nitrogen, Glucose, Calcium)   ?/Tacrolimus/Prograf drug level (mail to [facility] mailers and instructions provided by the patient)          Every 6 Months                 ?BK (Polyoma Virus) PCR Quantitative - Plasma                                            ?Urine for protein/creatinine   ? PRA/DSA level (mailers provided by the patient)         If you have any questions, please call [facility] at [phone] or [phone].    Please fax labs to [phone]

## 2017-12-27 ENCOUNTER — RESULTS ONLY (OUTPATIENT)
Dept: OTHER | Facility: CLINIC | Age: 62
End: 2017-12-27

## 2017-12-27 DIAGNOSIS — Z48.298 AFTERCARE FOLLOWING ORGAN TRANSPLANT: ICD-10-CM

## 2017-12-27 DIAGNOSIS — Z79.899 ENCOUNTER FOR LONG-TERM CURRENT USE OF MEDICATION: ICD-10-CM

## 2017-12-27 DIAGNOSIS — Z94.0 KIDNEY REPLACED BY TRANSPLANT: ICD-10-CM

## 2017-12-27 LAB
ANION GAP SERPL CALCULATED.3IONS-SCNC: 8 MMOL/L (ref 3–14)
BUN SERPL-MCNC: 15 MG/DL (ref 7–30)
CALCIUM SERPL-MCNC: 9 MG/DL (ref 8.5–10.1)
CHLORIDE SERPL-SCNC: 104 MMOL/L (ref 94–109)
CO2 SERPL-SCNC: 25 MMOL/L (ref 20–32)
CREAT SERPL-MCNC: 0.71 MG/DL (ref 0.52–1.04)
CREAT UR-MCNC: 74 MG/DL
ERYTHROCYTE [DISTWIDTH] IN BLOOD BY AUTOMATED COUNT: 12.9 % (ref 10–15)
GFR SERPL CREATININE-BSD FRML MDRD: 83 ML/MIN/1.7M2
GLUCOSE SERPL-MCNC: 110 MG/DL (ref 70–99)
HCT VFR BLD AUTO: 41.8 % (ref 35–47)
HGB BLD-MCNC: 13.8 G/DL (ref 11.7–15.7)
MCH RBC QN AUTO: 28.4 PG (ref 26.5–33)
MCHC RBC AUTO-ENTMCNC: 33 G/DL (ref 31.5–36.5)
MCV RBC AUTO: 86 FL (ref 78–100)
PLATELET # BLD AUTO: 104 10E9/L (ref 150–450)
POTASSIUM SERPL-SCNC: 3.9 MMOL/L (ref 3.4–5.3)
PROT UR-MCNC: 0.12 G/L
PROT/CREAT 24H UR: 0.17 G/G CR (ref 0–0.2)
RBC # BLD AUTO: 4.86 10E12/L (ref 3.8–5.2)
SODIUM SERPL-SCNC: 137 MMOL/L (ref 133–144)
WBC # BLD AUTO: 4.4 10E9/L (ref 4–11)

## 2017-12-27 PROCEDURE — 85027 COMPLETE CBC AUTOMATED: CPT | Performed by: INTERNAL MEDICINE

## 2017-12-27 PROCEDURE — 84156 ASSAY OF PROTEIN URINE: CPT | Performed by: INTERNAL MEDICINE

## 2017-12-27 PROCEDURE — 80197 ASSAY OF TACROLIMUS: CPT | Performed by: INTERNAL MEDICINE

## 2017-12-27 PROCEDURE — 80048 BASIC METABOLIC PNL TOTAL CA: CPT | Performed by: INTERNAL MEDICINE

## 2017-12-27 PROCEDURE — 86832 HLA CLASS I HIGH DEFIN QUAL: CPT | Performed by: INTERNAL MEDICINE

## 2017-12-27 PROCEDURE — 87799 DETECT AGENT NOS DNA QUANT: CPT | Performed by: INTERNAL MEDICINE

## 2017-12-27 PROCEDURE — 36415 COLL VENOUS BLD VENIPUNCTURE: CPT | Performed by: INTERNAL MEDICINE

## 2017-12-27 PROCEDURE — 86833 HLA CLASS II HIGH DEFIN QUAL: CPT | Performed by: INTERNAL MEDICINE

## 2017-12-28 LAB
TACROLIMUS BLD-MCNC: 6.5 UG/L (ref 5–15)
TME LAST DOSE: NORMAL H

## 2017-12-29 LAB
BKV DNA # SPEC NAA+PROBE: NORMAL COPIES/ML
BKV DNA SPEC NAA+PROBE-LOG#: NORMAL LOG COPIES/ML
PRA DONOR SPECIFIC ABY: NORMAL
SPECIMEN SOURCE: NORMAL

## 2018-01-11 LAB
CW4: 727
DONOR IDENTIFICATION: NORMAL
DSA COMMENTS: NORMAL
DSA PRESENT: YES
DSA TEST METHOD: NORMAL
ORGAN: NORMAL
SA1 CELL: NORMAL
SA1 COMMENTS: NORMAL
SA1 HI RISK ABY: NORMAL
SA1 MOD RISK ABY: NORMAL
SA1 TEST METHOD: NORMAL
SA2 CELL: NORMAL
SA2 COMMENTS: NORMAL
SA2 HI RISK ABY UA: NORMAL
SA2 MOD RISK ABY: NORMAL
SA2 TEST METHOD: NORMAL
UNOS CPRA: 0

## 2018-05-31 ENCOUNTER — RESULTS ONLY (OUTPATIENT)
Dept: OTHER | Facility: CLINIC | Age: 63
End: 2018-05-31

## 2018-05-31 DIAGNOSIS — Z79.899 ENCOUNTER FOR LONG-TERM CURRENT USE OF MEDICATION: ICD-10-CM

## 2018-05-31 DIAGNOSIS — Z94.0 KIDNEY REPLACED BY TRANSPLANT: ICD-10-CM

## 2018-05-31 DIAGNOSIS — Z48.298 AFTERCARE FOLLOWING ORGAN TRANSPLANT: ICD-10-CM

## 2018-05-31 LAB
ANION GAP SERPL CALCULATED.3IONS-SCNC: 5 MMOL/L (ref 3–14)
BUN SERPL-MCNC: 12 MG/DL (ref 7–30)
CALCIUM SERPL-MCNC: 9 MG/DL (ref 8.5–10.1)
CHLORIDE SERPL-SCNC: 107 MMOL/L (ref 94–109)
CO2 SERPL-SCNC: 28 MMOL/L (ref 20–32)
CREAT SERPL-MCNC: 0.71 MG/DL (ref 0.52–1.04)
CREAT UR-MCNC: 80 MG/DL
ERYTHROCYTE [DISTWIDTH] IN BLOOD BY AUTOMATED COUNT: 13.4 % (ref 10–15)
GFR SERPL CREATININE-BSD FRML MDRD: 83 ML/MIN/1.7M2
GLUCOSE SERPL-MCNC: 103 MG/DL (ref 70–99)
HCT VFR BLD AUTO: 39.5 % (ref 35–47)
HGB BLD-MCNC: 13.1 G/DL (ref 11.7–15.7)
MCH RBC QN AUTO: 29.2 PG (ref 26.5–33)
MCHC RBC AUTO-ENTMCNC: 33.2 G/DL (ref 31.5–36.5)
MCV RBC AUTO: 88 FL (ref 78–100)
PLATELET # BLD AUTO: 95 10E9/L (ref 150–450)
POTASSIUM SERPL-SCNC: 4 MMOL/L (ref 3.4–5.3)
PROT UR-MCNC: 0.17 G/L
PROT/CREAT 24H UR: 0.21 G/G CR (ref 0–0.2)
RBC # BLD AUTO: 4.49 10E12/L (ref 3.8–5.2)
SODIUM SERPL-SCNC: 140 MMOL/L (ref 133–144)
TACROLIMUS BLD-MCNC: 6.6 UG/L (ref 5–15)
TME LAST DOSE: NORMAL H
WBC # BLD AUTO: 3.5 10E9/L (ref 4–11)

## 2018-05-31 PROCEDURE — 84156 ASSAY OF PROTEIN URINE: CPT | Performed by: INTERNAL MEDICINE

## 2018-05-31 PROCEDURE — 86833 HLA CLASS II HIGH DEFIN QUAL: CPT | Performed by: INTERNAL MEDICINE

## 2018-05-31 PROCEDURE — 80048 BASIC METABOLIC PNL TOTAL CA: CPT | Performed by: INTERNAL MEDICINE

## 2018-05-31 PROCEDURE — 86832 HLA CLASS I HIGH DEFIN QUAL: CPT | Performed by: INTERNAL MEDICINE

## 2018-05-31 PROCEDURE — 36415 COLL VENOUS BLD VENIPUNCTURE: CPT | Performed by: INTERNAL MEDICINE

## 2018-05-31 PROCEDURE — 87799 DETECT AGENT NOS DNA QUANT: CPT | Performed by: INTERNAL MEDICINE

## 2018-05-31 PROCEDURE — 80197 ASSAY OF TACROLIMUS: CPT | Performed by: INTERNAL MEDICINE

## 2018-05-31 PROCEDURE — 85027 COMPLETE CBC AUTOMATED: CPT | Performed by: INTERNAL MEDICINE

## 2018-06-01 DIAGNOSIS — Z79.60 LONG-TERM USE OF IMMUNOSUPPRESSANT MEDICATION: ICD-10-CM

## 2018-06-01 DIAGNOSIS — Z94.0 KIDNEY TRANSPLANT RECIPIENT: Primary | ICD-10-CM

## 2018-06-01 RX ORDER — TACROLIMUS 0.5 MG/1
CAPSULE ORAL
Qty: 60 CAPSULE | Refills: 11
Start: 2018-06-01 | End: 2019-09-09

## 2018-06-01 RX ORDER — TACROLIMUS 1 MG/1
1 CAPSULE ORAL 2 TIMES DAILY
Qty: 60 CAPSULE | Refills: 11 | Status: SHIPPED | OUTPATIENT
Start: 2018-06-01 | End: 2018-09-04

## 2018-06-01 NOTE — TELEPHONE ENCOUNTER
Call placed to patient: Patient confirms current dose and accurate trough level. Patient v\u to decrease dose to 1 mg BID and repeat level next week. Order/rx placed

## 2018-06-01 NOTE — TELEPHONE ENCOUNTER
ISSUE:  Tac level 6.6 (s/b 4-6)    PLAN/TASK:  Call pt and confirm current dose of 1.5mg BID and good 12 hour level  Ask that she DECREASE dose to 1mg twice daily and recheck level mid next week  Place order, update rx.

## 2018-06-04 LAB
DONOR IDENTIFICATION: NORMAL
DSA COMMENTS: NORMAL
DSA PRESENT: NO
DSA TEST METHOD: NORMAL
ORGAN: NORMAL
SA1 CELL: NORMAL
SA1 COMMENTS: NORMAL
SA1 HI RISK ABY: NORMAL
SA1 MOD RISK ABY: NORMAL
SA1 TEST METHOD: NORMAL
SA2 CELL: NORMAL
SA2 COMMENTS: NORMAL
SA2 HI RISK ABY UA: NORMAL
SA2 MOD RISK ABY: NORMAL
SA2 TEST METHOD: NORMAL

## 2018-06-20 DIAGNOSIS — I10 ESSENTIAL HYPERTENSION WITH GOAL BLOOD PRESSURE LESS THAN 130/80: ICD-10-CM

## 2018-06-20 RX ORDER — AMLODIPINE BESYLATE 5 MG/1
TABLET ORAL
Qty: 90 TABLET | Refills: 3 | Status: SHIPPED | OUTPATIENT
Start: 2018-06-20 | End: 2018-09-04

## 2018-08-02 DIAGNOSIS — Z94.0 KIDNEY REPLACED BY TRANSPLANT: ICD-10-CM

## 2018-08-02 DIAGNOSIS — Z79.899 ENCOUNTER FOR LONG-TERM CURRENT USE OF MEDICATION: ICD-10-CM

## 2018-08-02 DIAGNOSIS — Z48.298 AFTERCARE FOLLOWING ORGAN TRANSPLANT: ICD-10-CM

## 2018-08-02 LAB
ANION GAP SERPL CALCULATED.3IONS-SCNC: 6 MMOL/L (ref 3–14)
BUN SERPL-MCNC: 14 MG/DL (ref 7–30)
CALCIUM SERPL-MCNC: 8.8 MG/DL (ref 8.5–10.1)
CHLORIDE SERPL-SCNC: 107 MMOL/L (ref 94–109)
CO2 SERPL-SCNC: 26 MMOL/L (ref 20–32)
CREAT SERPL-MCNC: 0.7 MG/DL (ref 0.52–1.04)
ERYTHROCYTE [DISTWIDTH] IN BLOOD BY AUTOMATED COUNT: 13 % (ref 10–15)
GFR SERPL CREATININE-BSD FRML MDRD: 85 ML/MIN/1.7M2
GLUCOSE SERPL-MCNC: 93 MG/DL (ref 70–99)
HCT VFR BLD AUTO: 40.6 % (ref 35–47)
HGB BLD-MCNC: 13.5 G/DL (ref 11.7–15.7)
MCH RBC QN AUTO: 28.8 PG (ref 26.5–33)
MCHC RBC AUTO-ENTMCNC: 33.3 G/DL (ref 31.5–36.5)
MCV RBC AUTO: 87 FL (ref 78–100)
PLATELET # BLD AUTO: 95 10E9/L (ref 150–450)
POTASSIUM SERPL-SCNC: 3.9 MMOL/L (ref 3.4–5.3)
RBC # BLD AUTO: 4.68 10E12/L (ref 3.8–5.2)
SODIUM SERPL-SCNC: 139 MMOL/L (ref 133–144)
WBC # BLD AUTO: 3.4 10E9/L (ref 4–11)

## 2018-08-02 PROCEDURE — 80197 ASSAY OF TACROLIMUS: CPT | Performed by: INTERNAL MEDICINE

## 2018-08-02 PROCEDURE — 80048 BASIC METABOLIC PNL TOTAL CA: CPT | Performed by: INTERNAL MEDICINE

## 2018-08-02 PROCEDURE — 85027 COMPLETE CBC AUTOMATED: CPT | Performed by: INTERNAL MEDICINE

## 2018-08-02 PROCEDURE — 36415 COLL VENOUS BLD VENIPUNCTURE: CPT | Performed by: INTERNAL MEDICINE

## 2018-08-03 LAB
TACROLIMUS BLD-MCNC: 4.6 UG/L (ref 5–15)
TME LAST DOSE: ABNORMAL H

## 2018-08-21 ENCOUNTER — TELEPHONE (OUTPATIENT)
Dept: NEPHROLOGY | Facility: CLINIC | Age: 63
End: 2018-08-21

## 2018-08-21 NOTE — TELEPHONE ENCOUNTER
Left voicemail for patient to call back (follow up from last transplant appointment).    Taryn Page RN

## 2018-09-04 ENCOUNTER — RECORDS - HEALTHEAST (OUTPATIENT)
Dept: ADMINISTRATIVE | Facility: OTHER | Age: 63
End: 2018-09-04

## 2018-09-04 ENCOUNTER — OFFICE VISIT (OUTPATIENT)
Dept: NEPHROLOGY | Facility: CLINIC | Age: 63
End: 2018-09-04
Attending: INTERNAL MEDICINE
Payer: COMMERCIAL

## 2018-09-04 VITALS
OXYGEN SATURATION: 97 % | BODY MASS INDEX: 24.56 KG/M2 | DIASTOLIC BLOOD PRESSURE: 84 MMHG | SYSTOLIC BLOOD PRESSURE: 138 MMHG | TEMPERATURE: 98.3 F | HEART RATE: 89 BPM | WEIGHT: 126.8 LBS

## 2018-09-04 DIAGNOSIS — I15.1 HYPERTENSION SECONDARY TO OTHER RENAL DISORDERS: ICD-10-CM

## 2018-09-04 DIAGNOSIS — Z94.0 KIDNEY REPLACED BY TRANSPLANT: ICD-10-CM

## 2018-09-04 DIAGNOSIS — D84.9 IMMUNOSUPPRESSED STATUS (H): ICD-10-CM

## 2018-09-04 DIAGNOSIS — Z48.298 AFTERCARE FOLLOWING ORGAN TRANSPLANT: Primary | ICD-10-CM

## 2018-09-04 DIAGNOSIS — D69.6 THROMBOCYTOPENIA (H): ICD-10-CM

## 2018-09-04 DIAGNOSIS — I10 ESSENTIAL HYPERTENSION WITH GOAL BLOOD PRESSURE LESS THAN 130/80: ICD-10-CM

## 2018-09-04 DIAGNOSIS — Z94.0 KIDNEY TRANSPLANT RECIPIENT: ICD-10-CM

## 2018-09-04 DIAGNOSIS — Z79.60 LONG-TERM USE OF IMMUNOSUPPRESSANT MEDICATION: ICD-10-CM

## 2018-09-04 DIAGNOSIS — Z94.0 KIDNEY TRANSPLANTED: ICD-10-CM

## 2018-09-04 DIAGNOSIS — E55.9 VITAMIN D DEFICIENCY: ICD-10-CM

## 2018-09-04 DIAGNOSIS — Z79.899 ENCOUNTER FOR LONG-TERM CURRENT USE OF MEDICATION: ICD-10-CM

## 2018-09-04 DIAGNOSIS — N18.4 CKD (CHRONIC KIDNEY DISEASE) STAGE 4, GFR 15-29 ML/MIN (H): ICD-10-CM

## 2018-09-04 DIAGNOSIS — Z48.298 AFTERCARE FOLLOWING ORGAN TRANSPLANT: ICD-10-CM

## 2018-09-04 LAB
ANION GAP SERPL CALCULATED.3IONS-SCNC: 5 MMOL/L (ref 3–14)
BUN SERPL-MCNC: 13 MG/DL (ref 7–30)
CALCIUM SERPL-MCNC: 9.1 MG/DL (ref 8.5–10.1)
CHLORIDE SERPL-SCNC: 108 MMOL/L (ref 94–109)
CO2 SERPL-SCNC: 28 MMOL/L (ref 20–32)
CREAT SERPL-MCNC: 0.65 MG/DL (ref 0.52–1.04)
ERYTHROCYTE [DISTWIDTH] IN BLOOD BY AUTOMATED COUNT: 12.8 % (ref 10–15)
GFR SERPL CREATININE-BSD FRML MDRD: >90 ML/MIN/1.7M2
GLUCOSE SERPL-MCNC: 107 MG/DL (ref 70–99)
HCT VFR BLD AUTO: 40.5 % (ref 35–47)
HGB BLD-MCNC: 13.5 G/DL (ref 11.7–15.7)
MCH RBC QN AUTO: 29.1 PG (ref 26.5–33)
MCHC RBC AUTO-ENTMCNC: 33.3 G/DL (ref 31.5–36.5)
MCV RBC AUTO: 87 FL (ref 78–100)
PLATELET # BLD AUTO: 88 10E9/L (ref 150–450)
POTASSIUM SERPL-SCNC: 3.9 MMOL/L (ref 3.4–5.3)
RBC # BLD AUTO: 4.64 10E12/L (ref 3.8–5.2)
SODIUM SERPL-SCNC: 141 MMOL/L (ref 133–144)
TACROLIMUS BLD-MCNC: 5.1 UG/L (ref 5–15)
TME LAST DOSE: NORMAL H
WBC # BLD AUTO: 3.2 10E9/L (ref 4–11)

## 2018-09-04 PROCEDURE — 85027 COMPLETE CBC AUTOMATED: CPT | Performed by: INTERNAL MEDICINE

## 2018-09-04 PROCEDURE — G0463 HOSPITAL OUTPT CLINIC VISIT: HCPCS | Mod: ZF

## 2018-09-04 PROCEDURE — 80048 BASIC METABOLIC PNL TOTAL CA: CPT | Performed by: INTERNAL MEDICINE

## 2018-09-04 PROCEDURE — 80197 ASSAY OF TACROLIMUS: CPT | Performed by: INTERNAL MEDICINE

## 2018-09-04 PROCEDURE — 36415 COLL VENOUS BLD VENIPUNCTURE: CPT | Performed by: INTERNAL MEDICINE

## 2018-09-04 RX ORDER — SULFAMETHOXAZOLE AND TRIMETHOPRIM 400; 80 MG/1; MG/1
1 TABLET ORAL DAILY
Qty: 90 TABLET | Refills: 3 | Status: SHIPPED | OUTPATIENT
Start: 2018-09-04 | End: 2019-09-16

## 2018-09-04 RX ORDER — AMLODIPINE BESYLATE 5 MG/1
5 TABLET ORAL AT BEDTIME
Qty: 90 TABLET | Refills: 3 | Status: SHIPPED | OUTPATIENT
Start: 2018-09-04 | End: 2019-08-24

## 2018-09-04 RX ORDER — TACROLIMUS 1 MG/1
1 CAPSULE ORAL 2 TIMES DAILY
Qty: 60 CAPSULE | Refills: 11 | Status: SHIPPED | OUTPATIENT
Start: 2018-09-04 | End: 2018-09-14

## 2018-09-04 RX ORDER — MYCOPHENOLATE MOFETIL 250 MG/1
750 CAPSULE ORAL 2 TIMES DAILY
Qty: 180 CAPSULE | Refills: 11 | Status: SHIPPED | OUTPATIENT
Start: 2018-09-04 | End: 2018-09-14

## 2018-09-04 ASSESSMENT — PAIN SCALES - GENERAL: PAINLEVEL: NO PAIN (0)

## 2018-09-04 NOTE — PROGRESS NOTES
TRANSPLANT NEPHROLOGY VISIT    Assessment & Plan   # LDKT: basline Cr ~ 0.8-0.9; Stable   - Proteinuria: { :374196810}    {# Pancreas Transplant (delete if not needed)    :360023239}    # Immunosuppression: {medications :774567664}   - Changes: { :798538}    # Prophylaxis:   - PJP: { :758460066}   - CMV: { :358926890}    # Transplant History:    Transplant: 9/29/2015 (Kidney)    Donor Type: Living Donor Class:    Crossmatch at time of Tx: {Neg/Pos    :767209}    DSA at time of Tx: {Yes/No    :014131}    Latest DSA: {Yes/No    :228350} Date of last check: ***   Significant changes in immunosuppression: { :885099}    Biopsy: {Yes/No    :542428} Rejection History:{Yes/No    :352301}   CMV IgG Ab Discordance (D+/R-): {Yes/No    :431188}    EBV IgG Ab Discordance (D+/R-): {Yes/No    :592406}    History of BK Viremia: {Yes/No    :521617}    Significant Complications: {complications    :629343179}    Transplant Office Phone Number: [phone]    # Hypertension: {BP Control:854624392}; Goal BP: { :670054221}   - Changes: { :454163}    # Anemia in chronic renal disease: Hgb: { :252806580}   - Iron studies: { :482917716}    # Mineral Bone Disorder:   - Secondary renal hyperparathyroidism: ***   - Vitamin D: ***   - Calcium: ***   - Phosphorus: ***    # Electrolytes:    - Magnesium: ***   - Potassium: ***    # Other Medical Issues: { :025610118}    Return visit: No Follow-up on file.    Assessment and plan was discussed with the patient and she voiced her understanding and agreement.    Shaheed Graff MD    Chief Complaint   Ms. Navarro is a 62 year old here for { :563591600}    History of Present Illness     Recent Hospitalizations:  [] No [] Yes    New Medical Issues: [] No [] Yes    Decreased energy: [] No [] Yes    Chest pain or SOB with exertion:  [] No [] Yes    Appetite change or weight change: [] No [] Yes    Nausea, vomiting or diarrhea:  [] No [] Yes    Fever, sweats or chills: [] No [] Yes    Leg swelling: [] No [] Yes   "    Other medical issues:  {Yes/No    :236727}    Home BP: { :93405038}    Review of Systems   {ROS:463393473}    Problem List   Patient Active Problem List   Diagnosis     Polycystic kidney disease     Hypertension secondary to other renal disorders     Vitamin D deficiency     Dyslipidemia     Polycystic liver disease     Kidney replaced by transplant     Immunosuppressed status (H)     Thrombocytopenia (H)     Steroid-induced hyperglycemia     Aftercare following organ transplant       Social History   Social History   Substance Use Topics     Smoking status: Never Smoker     Smokeless tobacco: Never Used     Alcohol use 0.5 oz/week     1 Standard drinks or equivalent per week       Allergies   Allergies   Allergen Reactions     Contrast Dye Unknown     Mold Unknown       Medications   Current Outpatient Prescriptions   Medication Sig     amLODIPine (NORVASC) 5 MG tablet TAKE 1 TABLET BY MOUTH  DAILY     mycophenolate (GENERIC EQUIVALENT) 250 MG capsule Take 4 capsules (1,000 mg) by mouth 2 times daily     sulfamethoxazole-trimethoprim (BACTRIM/SEPTRA) 400-80 MG per tablet Take 1 tablet by mouth daily     tacrolimus (GENERIC EQUIVALENT) 0.5 MG capsule Hold     tacrolimus (GENERIC EQUIVALENT) 1 MG capsule Take 1 capsule (1 mg) by mouth 2 times daily     Vitamin D, Cholecalciferol, 1000 UNITS TABS Take 1,000 Units by mouth daily     aspirin 81 MG tablet Take 1 tablet (81 mg) by mouth daily (Patient not taking: Reported on 9/4/2018)     No current facility-administered medications for this visit.      There are no discontinued medications.    Physical Exam   Vital Signs: Temp 98.3  F (36.8  C) (Oral)  Wt 57.5 kg (126 lb 12.8 oz)  BMI 24.56 kg/m2  {EXAM    :[id number]::\"GENERAL APPEARANCE: alert and no distress\",\"HENT: mouth without ulcers or lesions\",\"LYMPHATICS: no cervical or supraclavicular nodes\",\"RESP: lungs clear to auscultation - no rales, rhonchi or wheezes\",\"CV: regular rhythm, normal rate, no rub, no " "murmur\",\"EDEMA: no LE edema bilaterally\",\"ABDOMEN: soft, nondistended, nontender, bowel sounds normal\",\"MS: extremities normal - no gross deformities noted, no evidence of inflammation in joints, no muscle tenderness\",\"SKIN: no rash\"}    Data   Renal -   Recent Labs   Lab Test  08/02/18   0956  05/31/18   0909  12/27/17   0910   NA  139  140  137   POTASSIUM  3.9  4.0  3.9   CHLORIDE  107  107  104   CO2  26  28  25   BUN  14  12  15   CR  0.70  0.71  0.71   GLC  93  103*  110*   KIM  8.8  9.0  9.0     Recent Labs   Lab Test  03/22/16   0908  12/28/15   0917  12/21/15   0909  12/14/15   0903   MAG  1.8  1.7  1.5*  1.4*   PHOS   --   3.5  3.1  2.9     Recent Labs   Lab Test  10/17/16   0934  10/01/15   0720   PTHI  42  301*     Recent Labs   Lab Test  09/18/17   0919  10/17/16   0934  10/04/15   0538   VITDT  34  29  17*       CBC -   Recent Labs   Lab Test  09/04/18   0901  08/02/18   0956  05/31/18   0909   WBC  3.2*  3.4*  3.5*   HGB  13.5  13.5  13.1   PLT  88*  95*  95*       LFTs -   Recent Labs   Lab Test  04/25/16   0908  09/28/15   0740  01/26/15   0708   ALKPHOS  191*  342*  209*   BILITOTAL  0.5  0.3  0.3   ALT  36  68*  31   AST  27  45  22   PROTTOTAL  6.7*  6.9  7.0   ALBUMIN  3.5  3.6  3.5       Pancreas -  Recent Labs   Lab Test  09/30/15   0635   A1C  5.5     No lab results found.    Iron Panel -   Recent Labs   Lab Test  10/08/15   0709   IRON  69   IRONSAT  28   MAXIMO  865*       Transplant -   Recent Labs   Lab Test  09/02/16   0902  08/08/16   0905  07/18/16   0912   CSPEC  Plasma  Plasma  Plasma   CMVQNT  CMV DNA Not Detected   The [employer] AmpliPrep/MADDISON TaqMan CMV Test is an FDA-approved in vitro nucleic   acid amplification test for the quantitation of cytomegalovirus DNA in human   plasma (EDTA plasma) using the [employer] AmpliPrep Instrument for automated viral   nucleic acid extraction and the [employer] TaqMan Analyzer or M[employer] TaqMan for   automated Real Time amplification and detection of the viral " nucleic acid   target.   Titer results are reported in International Units/mL (IU/mL using 1st WHO   International standard for Human Cytomegalovirus for Nucleic Acid Amplification   based assays. The conversion factor between CMV DNA copis/mL (as defined by the   Roche MADDISON TaqMan CMV test) and International Units is the CMV DNA   concentration in IU/mL x 1.1 copies/IU = CMV DNA in copies/mL.   This assay has received FDA approval for the testing of human plasma only. The   [facility] at the [facility], has validated the pe rformance characteristics of the Roche   CMV assay for plasma, bronchial alveolar lavage/wash and urine.    CMV DNA Not Detected   The M[employer] AmpliPrep/MADDISON TaqMan CMV Test is an FDA-approved in vitro nucleic   acid amplification test for the quantitation of cytomegalovirus DNA in human   plasma (EDTA plasma) using the [employer] AmpliPrep Instrument for automated viral   nucleic acid extraction and the [employer] TaqMan Analyzer or [employer] TaqMan for   automated Real Time amplification and detection of the viral nucleic acid   target.   Titer results are reported in International Units/mL (IU/mL using 1st WHO   International standard for Human Cytomegalovirus for Nucleic Acid Amplification   based assays. The conversion factor between CMV DNA copis/mL (as defined by the   Roche MADDISON TaqMan CMV test) and International Units is the CMV DNA   concentration in IU/mL x 1.1 copies/IU = CMV DNA in copies/mL.   This assay has received FDA approval for the testing of human plasma only. The   [facility] Diagnostic Laboratory at the [facility], [place], has validated the pe rformance characteristics of the Roche   CMV assay for plasma, bronchial alveolar lavage/wash and urine.    CMV DNA Not Detected   The M[employer]ON AmpliPrep/MADDISON TaqMan CMV Test is an FDA-approved in vitro nucleic   acid amplification test for  the quantitation of cytomegalovirus DNA in human   plasma (EDTA plasma) using the M[employer] AmpliPrep Instrument for automated viral   nucleic acid extraction and the M[employer] TaqMan Analyzer or M[employer]DISON TaqMan for   automated Real Time amplification and detection of the viral nucleic acid   target.   Titer results are reported in International Units/mL (IU/mL using 1st WHO   International standard for Human Cytomegalovirus for Nucleic Acid Amplification   based assays. The conversion factor between CMV DNA copis/mL (as defined by the   Roche MADDISON TaqMan CMV test) and International Units is the CMV DNA   concentration in IU/mL x 1.1 copies/IU = CMV DNA in copies/mL.   This assay has received FDA approval for the testing of human plasma only. The   [facility] Laboratory at the [facility], [place], has validated the pe rformance characteristics of the Roche   CMV assay for plasma, bronchial alveolar lavage/wash and urine.     CMVLOG  Not Calculated  Not Calculated  Not Calculated     Recent Labs   Lab Test  09/02/16   0902  08/08/16   0905  07/18/16   0912   EBRES  EBV DNA Not Detected  EBV DNA Not Detected  EBV DNA Not Detected   EBLOG  Not Calculated   The Real-Time quantitative EBV assay was developed and its performance   characteristics determined by the [facility] Diagnostic Laboratory at   the [facility] in [place], [place].  The   primers and probes are Analyte Specific Reagents (ASRs) manufactured  by   [employer].   ASRs are used in many laboratory tests necessary for standard medical care and   generally do not require U.S. Food and Drug Administration approval.  The FDA   has determined that such clearance or approval is not necessary.  This test is   used for clinical purposes.  It should not be regarded as investigational or   research.   This laboratory is certified under the Clinical Laboratory Improvement   Amendments of  1988 (CLIA-88) as qualified to perform high complexity clinical   laboratory testing.   The quantitative range of this assay is 500-22,500,00 copies/mL (2.7-7.4 log   copies/mL).  A negative result does not rule out the presence of PCR inhibitors    in the patient specimen or EBV DNA nucleic acid in concentrations below the   level of detection of the assay.  Inhibition may also lead to underestimation   of viral quantitation.    Not Calculated   The Real-Time quantitative EBV assay was developed and its performance   characteristics determined by the [facility] Diseases Diagnostic Laboratory at   the [facility] in [place], [place].  The   primers and probes are Analyte Specific Reagents (ASRs) manufactured  by   [employer].   ASRs are used in many laboratory tests necessary for standard medical care and   generally do not require U.S. Food and Drug Administration approval.  The FDA   has determined that such clearance or approval is not necessary.  This test is   used for clinical purposes.  It should not be regarded as investigational or   research.   This laboratory is certified under the Clinical Laboratory Improvement   Amendments of 1988 (CLIA-88) as qualified to perform high complexity clinical   laboratory testing.   The quantitative range of this assay is 500-22,500,00 copies/mL (2.7-7.4 log   copies/mL).  A negative result does not rule out the presence of PCR inhibitors    in the patient specimen or EBV DNA nucleic acid in concentrations below the   level of detection of the assay.  Inhibition may also lead to underestimation   of viral quantitation.    Not Calculated   The Real-Time quantitative EBV assay was developed and its performance   characteristics determined by the [facility] Diagnostic Laboratory at   the [facility] in [place], [place].  The   primers and probes are Analyte Specific Reagents (ASRs) manufactured  by    [employer].   ASRs are used in many laboratory tests necessary for standard medical care and   generally do not require U.S. Food and Drug Administration approval.  The FDA   has determined that such clearance or approval is not necessary.  This test is   used for clinical purposes.  It should not be regarded as investigational or   research.   This laboratory is certified under the Clinical Laboratory Improvement   Amendments of 1988 (CLIA-88) as qualified to perform high complexity clinical   laboratory testing.   The quantitative range of this assay is 500-22,500,00 copies/mL (2.7-7.4 log   copies/mL).  A negative result does not rule out the presence of PCR inhibitors    in the patient specimen or EBV DNA nucleic acid in concentrations below the   level of detection of the assay.  Inhibition may also lead to underestimation   of viral quantitation.       Recent Labs   Lab Test  05/31/18   0909  12/27/17   0910  06/27/16   0903   BKRES  BK Virus DNA Not Detected  BK Virus DNA Not Detected  BK Virus DNA Not Detected   BKLOG  Not Calculated  Not Calculated  Not Calculated   The Real-Time quantitative BK Virus assay was developed and its performance   characteristics determined by the [facility] Diagnostic Laboratory at   the [facility] in [place], [place]. The   primers and probes for each analyte are Analyte Specific Reagents (ASRs)   manufactured by [employer].   ASRs are used in many laboratory tests necessary for standard medical care and   generally do not require U.S. Food and Drug Administration approval. The FDA   has determined that such clearance or approval is not necessary.   This test is used for clinical purposes. It should not be regarded as   investigational or for research. This laboratory is certified under the   Clinical Laboratory Improvement Amendments of 1988 (CLIA-88) as qualified to   perform high complexity clinical laboratory testing.         Recent Labs    Lab Test  08/02/18   0958  05/31/18   0910  12/27/17   0910   DOSTAC  2145 8/1/18  2200 5/31/18  2100 12/26/17   TACROL  4.6*  6.6  6.5     Recent Labs   Lab Test  10/17/16   0935  12/28/15   0917  12/21/15   0909   DOSMPA  ld 10/16/16 2100  LD 12/27/2015 2100  LD 12/20/15 2100   MPACID  5.02*  4.66*  2.66   MPAG  51.7  37.3  31.8

## 2018-09-04 NOTE — NURSING NOTE
Chief Complaint   Patient presents with     RECHECK     Follow Up Kidney TX 2015     /84 (BP Location: Right arm)  Pulse 89  Temp 98.3  F (36.8  C) (Oral)  Wt 57.5 kg (126 lb 12.8 oz)  SpO2 97%  BMI 24.56 kg/m2   Isha Adkins

## 2018-09-04 NOTE — LETTER
9/4/2018      RE: Maritza Navarro  [address] [facility] [address]       TRANSPLANT NEPHROLOGY VISIT    Assessment & Plan   # LDKT: basline Cr ~ 0.8- 0.9; Stable   - Proteinuria: Normal    - BK PCR: Negative   - Recent DSA: No  Date last checked: 5/2018    # Immunosuppression: Tacrolimus immediate release (goal  4-6) and Mycophenolate mofetil (goal  1-3.5)   - Changes: No    # Prophylaxis:   - PJP: Bactrim    # Transplant History:    Transplant: 9/29/2015 (Kidney)    Donor Type: Living Donor Class:    Crossmatch at time of Tx: negative    DSA at time of Tx: Yes    Significant changes in immunosuppression: None    Biopsy: No Rejection History:No   History of BK Viremia: No    Significant Complications: None    Transplant Office Phone Number: [phone]    # Hypertension: controlled; Goal BP: 140/90   - Changes: No    # Anemia in chronic renal disease: Hgb: Stable   - Iron studies: replete in the past    # Mineral Bone Disorder:   - Vitamin D: normal vitamin D level and will continue on cholecalciferol.    # Skin cancer screening: will refer to Dermatology.     Return visit: 1 year    Assessment and plan was discussed with the patient and she voiced her understanding and agreement.    Shaheed Graff MD     Attestation:  This patient has been seen and evaluated by me, Maxim Brooke MD.  I have reviewed the note and agree with plan of care as documented by the fellow.       Chief Complaint   Ms. 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    She was last seen in clinic on 8/2017. She has not had any major illnesses or hospitalization. She has no complaints today. Appetite is good with stable weight.  No nausea, vomiting or diarrhea.  No fever, sweats or chills.  No leg swelling.  No pain or burning with urination.    Recent Hospitalizations:  [x] No [] Yes    New Medical  Issues: [x] No [] Yes    Decreased energy: [x] No [] Yes    Chest pain or SOB with exertion:  [x] No [] Yes    Appetite change or weight change: [x] No [] Yes    Nausea, vomiting or diarrhea:  [x] No [] Yes    Fever, sweats or chills: [x] No [] Yes    Leg swelling: [x] No [] Yes      Other medical issues:  No    Home BP: Usually in 120/70s    Review of Systems   A comprehensive review of systems was obtained and negative, except as noted in the HPI or PMH.    Problem List   Patient Active Problem List   Diagnosis     Polycystic kidney disease     Hypertension secondary to other renal disorders     Vitamin D deficiency     Dyslipidemia     Polycystic liver disease     Kidney replaced by transplant     Immunosuppressed status (H)     Thrombocytopenia (H)     Steroid-induced hyperglycemia     Aftercare following organ transplant       Social History   Social History   Substance Use Topics     Smoking status: Never Smoker     Smokeless tobacco: Never Used     Alcohol use 0.5 oz/week     1 Standard drinks or equivalent per week       Allergies   Allergies   Allergen Reactions     Contrast Dye Unknown     Mold Unknown       Medications   Current Outpatient Prescriptions   Medication Sig     amLODIPine (NORVASC) 5 MG tablet TAKE 1 TABLET BY MOUTH  DAILY     mycophenolate (GENERIC EQUIVALENT) 250 MG capsule Take 4 capsules (1,000 mg) by mouth 2 times daily     sulfamethoxazole-trimethoprim (BACTRIM/SEPTRA) 400-80 MG per tablet Take 1 tablet by mouth daily     tacrolimus (GENERIC EQUIVALENT) 0.5 MG capsule Hold     tacrolimus (GENERIC EQUIVALENT) 1 MG capsule Take 1 capsule (1 mg) by mouth 2 times daily     Vitamin D, Cholecalciferol, 1000 UNITS TABS Take 1,000 Units by mouth daily     aspirin 81 MG tablet Take 1 tablet (81 mg) by mouth daily (Patient not taking: Reported on 9/4/2018)     No current facility-administered medications for this visit.      There are no discontinued medications.    Physical Exam   Vital Signs:  /84 (BP Location: Right arm)  Pulse 89  Temp 98.3  F (36.8  C) (Oral)  Wt 57.5 kg (126 lb 12.8 oz)  SpO2 97%  BMI 24.56 kg/m2     GENERAL APPEARANCE: alert and no distress  HENT: mouth without ulcers or lesions  LYMPHATICS: no cervical or supraclavicular nodes  RESP: lungs clear to auscultation - no rales, rhonchi or wheezes  CV: regular rhythm, normal rate, no rub, no murmur  EDEMA: no LE edema bilaterally  ABDOMEN: soft, nondistended, nontender, bowel sounds normal  MS: extremities normal - no gross deformities noted, no evidence of inflammation in joints, no muscle tenderness  SKIN: no rash  TX KIDNEY: normal    Data   Renal -   Recent Labs   Lab Test  08/02/18   0956  05/31/18   0909  12/27/17   0910   NA  139  140  137   POTASSIUM  3.9  4.0  3.9   CHLORIDE  107  107  104   CO2  26  28  25   BUN  14  12  15   CR  0.70  0.71  0.71   GLC  93  103*  110*   KIM  8.8  9.0  9.0     Recent Labs   Lab Test  03/22/16   0908  12/28/15   0917  12/21/15   0909  12/14/15   0903   MAG  1.8  1.7  1.5*  1.4*   PHOS   --   3.5  3.1  2.9     Recent Labs   Lab Test  10/17/16   0934  10/01/15   0720   PTHI  42  301*     Recent Labs   Lab Test  09/18/17   0919  10/17/16   0934  10/04/15   0538   VITDT  34  29  17*       CBC -   Recent Labs   Lab Test  09/04/18   0901  08/02/18   0956  05/31/18   0909   WBC  3.2*  3.4*  3.5*   HGB  13.5  13.5  13.1   PLT  88*  95*  95*       LFTs -   Recent Labs   Lab Test  04/25/16   0908  09/28/15   0740  01/26/15   0708   ALKPHOS  191*  342*  209*   BILITOTAL  0.5  0.3  0.3   ALT  36  68*  31   AST  27  45  22   PROTTOTAL  6.7*  6.9  7.0   ALBUMIN  3.5  3.6  3.5       Pancreas -  Recent Labs   Lab Test  09/30/15   0635   A1C  5.5     No lab results found.    Iron Panel -   Recent Labs   Lab Test  10/08/15   0709   IRON  69   IRONSAT  28   MAXIMO  865*       Transplant -   Recent Labs   Lab Test  09/02/16   0902  08/08/16   0905  07/18/16   0912   CSPEC  Plasma  Plasma  Plasma   CMVQNT  CMV  DNA Not Detected   The MADDISON AmpliPrep/MADDISON TaqMan CMV Test is an FDA-approved in vitro nucleic   acid amplification test for the quantitation of cytomegalovirus DNA in human   plasma (EDTA plasma) using the M[employer] AmpliPrep Instrument for automated viral   nucleic acid extraction and the M[employer]DISON TaqMan Analyzer or MADDISON TaqMan for   automated Real Time amplification and detection of the viral nucleic acid   target.   Titer results are reported in International Units/mL (IU/mL using 1st WHO   International standard for Human Cytomegalovirus for Nucleic Acid Amplification   based assays. The conversion factor between CMV DNA copis/mL (as defined by the   Roche MADDISON TaqMan CMV test) and International Units is the CMV DNA   concentration in IU/mL x 1.1 copies/IU = CMV DNA in copies/mL.   This assay has received FDA approval for the testing of human plasma only. The   Infectious Disease Diagnostic Laboratory at the [facility], [place], has validated the pe rformance characteristics of the Roche   CMV assay for plasma, bronchial alveolar lavage/wash and urine.    CMV DNA Not Detected   The MADDISON AmpliPrep/MADDISON TaqMan CMV Test is an FDA-approved in vitro nucleic   acid amplification test for the quantitation of cytomegalovirus DNA in human   plasma (EDTA plasma) using the MADDISON AmpliPrep Instrument for automated viral   nucleic acid extraction and the MADDISON TaqMan Analyzer or MADDISON TaqMan for   automated Real Time amplification and detection of the viral nucleic acid   target.   Titer results are reported in International Units/mL (IU/mL using 1st WHO   International standard for Human Cytomegalovirus for Nucleic Acid Amplification   based assays. The conversion factor between CMV DNA copis/mL (as defined by the   Roche MADDISON TaqMan CMV test) and International Units is the CMV DNA   concentration in IU/mL x 1.1 copies/IU = CMV DNA in copies/mL.   This assay has received FDA approval for the  testing of human plasma only. The   Infectious Disease Diagnostic Laboratory at the [facility], [place], has validated the pe rformance characteristics of the Roche   CMV assay for plasma, bronchial alveolar lavage/wash and urine.    CMV DNA Not Detected   The MADDISON AmpliPrep/MADDISON TaqMan CMV Test is an FDA-approved in vitro nucleic   acid amplification test for the quantitation of cytomegalovirus DNA in human   plasma (EDTA plasma) using the MADDISON AmpliPrep Instrument for automated viral   nucleic acid extraction and the MADDISON TaqMan Analyzer or MADDISON TaqMan for   automated Real Time amplification and detection of the viral nucleic acid   target.   Titer results are reported in International Units/mL (IU/mL using 1st WHO   International standard for Human Cytomegalovirus for Nucleic Acid Amplification   based assays. The conversion factor between CMV DNA copis/mL (as defined by the   Roche MADDISON TaqMan CMV test) and International Units is the CMV DNA   concentration in IU/mL x 1.1 copies/IU = CMV DNA in copies/mL.   This assay has received FDA approval for the testing of human plasma only. The   Infectious Disease Diagnostic Laboratory at the [facility], [place], has validated the pe rformance characteristics of the Roche   CMV assay for plasma, bronchial alveolar lavage/wash and urine.     CMVLOG  Not Calculated  Not Calculated  Not Calculated     Recent Labs   Lab Test  09/02/16   0902  08/08/16   0905  07/18/16   0912   EBRES  EBV DNA Not Detected  EBV DNA Not Detected  EBV DNA Not Detected   EBLOG  Not Calculated   The Real-Time quantitative EBV assay was developed and its performance   characteristics determined by the Infectious Diseases Diagnostic Laboratory at   the [facility] in [place], [place].  The   primers and probes are Analyte Specific Reagents (ASRs) manufactured  by   Qiagen.   ASRs are used in many  laboratory tests necessary for standard medical care and   generally do not require U.S. Food and Drug Administration approval.  The FDA   has determined that such clearance or approval is not necessary.  This test is   used for clinical purposes.  It should not be regarded as investigational or   research.   This laboratory is certified under the Clinical Laboratory Improvement   Amendments of 1988 (CLIA-88) as qualified to perform high complexity clinical   laboratory testing.   The quantitative range of this assay is 500-22,500,00 copies/mL (2.7-7.4 log   copies/mL).  A negative result does not rule out the presence of PCR inhibitors    in the patient specimen or EBV DNA nucleic acid in concentrations below the   level of detection of the assay.  Inhibition may also lead to underestimation   of viral quantitation.    Not Calculated   The Real-Time quantitative EBV assay was developed and its performance   characteristics determined by the Infectious Diseases Diagnostic Laboratory at   the [facility] in [place], [place].  The   primers and probes are Analyte Specific Reagents (ASRs) manufactured  by   Qiagen.   ASRs are used in many laboratory tests necessary for standard medical care and   generally do not require U.S. Food and Drug Administration approval.  The FDA   has determined that such clearance or approval is not necessary.  This test is   used for clinical purposes.  It should not be regarded as investigational or   research.   This laboratory is certified under the Clinical Laboratory Improvement   Amendments of 1988 (CLIA-88) as qualified to perform high complexity clinical   laboratory testing.   The quantitative range of this assay is 500-22,500,00 copies/mL (2.7-7.4 log   copies/mL).  A negative result does not rule out the presence of PCR inhibitors    in the patient specimen or EBV DNA nucleic acid in concentrations below the   level of detection of the assay.   Inhibition may also lead to underestimation   of viral quantitation.    Not Calculated   The Real-Time quantitative EBV assay was developed and its performance   characteristics determined by the Infectious Diseases Diagnostic Laboratory at   the [facility] in [place], [place].  The   primers and probes are Analyte Specific Reagents (ASRs) manufactured  by   Qiagen.   ASRs are used in many laboratory tests necessary for standard medical care and   generally do not require U.S. Food and Drug Administration approval.  The FDA   has determined that such clearance or approval is not necessary.  This test is   used for clinical purposes.  It should not be regarded as investigational or   research.   This laboratory is certified under the Clinical Laboratory Improvement   Amendments of 1988 (CLIA-88) as qualified to perform high complexity clinical   laboratory testing.   The quantitative range of this assay is 500-22,500,00 copies/mL (2.7-7.4 log   copies/mL).  A negative result does not rule out the presence of PCR inhibitors    in the patient specimen or EBV DNA nucleic acid in concentrations below the   level of detection of the assay.  Inhibition may also lead to underestimation   of viral quantitation.       Recent Labs   Lab Test  05/31/18   0909  12/27/17   0910  06/27/16   0903   BKRES  BK Virus DNA Not Detected  BK Virus DNA Not Detected  BK Virus DNA Not Detected   BKLOG  Not Calculated  Not Calculated  Not Calculated   The Real-Time quantitative BK Virus assay was developed and its performance   characteristics determined by the Infectious Diseases Diagnostic Laboratory at   the [facility] in [place], [place]. The   primers and probes for each analyte are Analyte Specific Reagents (ASRs)   manufactured by [employer].   ASRs are used in many laboratory tests necessary for standard medical care and   generally do not require U.S. Food and Drug  Administration approval. The FDA   has determined that such clearance or approval is not necessary.   This test is used for clinical purposes. It should not be regarded as   investigational or for research. This laboratory is certified under the   Clinical Laboratory Improvement Amendments of 1988 (CLIA-88) as qualified to   perform high complexity clinical laboratory testing.         Recent Labs   Lab Test  08/02/18   0958  05/31/18   0910  12/27/17   0910   DOSTAC  2145 8/1/18  2200 5/31/18  2100 12/26/17   TACROL  4.6*  6.6  6.5     Recent Labs   Lab Test  10/17/16   0935  12/28/15   0917  12/21/15   0909   DOSMPA  ld 10/16/16 2100  LD 12/27/2015 2100  LD 12/20/15 2100   MPACID  5.02*  4.66*  2.66   MPAG  51.7  37.3  31.8       Maxim Brooke MD

## 2018-09-04 NOTE — MR AVS SNAPSHOT
After Visit Summary   9/4/2018    Maritza Navarro    MRN: [id number]           Patient Information     Date Of Birth          1955        Visit Information        Provider Department      9/4/2018 2:20 PM Recipient, [facility]Pancreas [facility] Nephrology        Today's Diagnoses     Aftercare following organ transplant    -  1    Kidney replaced by transplant        Thrombocytopenia (H)        Immunosuppressed status (H)        Hypertension secondary to other renal disorders        Vitamin D deficiency        Essential hypertension with goal blood pressure less than 130/80        CKD (chronic kidney disease) stage 4, GFR 15-29 ml/min (H)        Kidney transplant recipient        Long-term use of immunosuppressant medication        Kidney transplanted           Follow-ups after your visit        Additional Services     DERMATOLOGY REFERRAL       Your provider has referred you to: [facility]: [facility] [phone]   [email or web address]    Please be aware that coverage of these services is subject to the terms and limitations of your health insurance plan.  Call member services at your health plan with any benefit or coverage questions.      Please bring the following with you to your appointment:    (1) Any X-Rays, CTs or MRIs which have been performed.  Contact the facility where they were done to arrange for  prior to your scheduled appointment.    (2) List of current medications  (3) This referral request   (4) Any documents/labs given to you for this referral                  Follow-up notes from your care team     Return in about 1 year (around 9/4/2019).      Who to contact     If you have questions or need follow up information about today's clinic visit or your schedule please contact [facility] NEPHROLOGY directly at [phone].  Normal or non-critical lab and imaging results will be communicated to you  by [employer], letter or phone within 4 business days after the clinic has received the results. If you do not hear from us within 7 days, please contact the clinic through [employer] or phone. If you have a critical or abnormal lab result, we will notify you by phone as soon as possible.  Submit refill requests through [employer] or call your pharmacy and they will forward the refill request to us. Please allow 3 business days for your refill to be completed.          Additional Information About Your Visit        [employer] Information     [employer] gives you secure access to your electronic health record. If you see a primary care provider, you can also send messages to your care team and make appointments. If you have questions, please call your primary care clinic.  If you do not have a primary care provider, please call [phone] and they will assist you.        Care EveryWhere ID     This is your Care EveryWhere ID. This could be used by other organizations to access your [place] medical records  [id number]        Your Vitals Were     Pulse Temperature Pulse Oximetry BMI (Body Mass Index)          89 98.3  F (36.8  C) (Oral) 97% 24.56 kg/m2         Blood Pressure from Last 3 Encounters:   09/04/18 138/84   08/22/17 145/82   09/29/16 122/86    Weight from Last 3 Encounters:   09/04/18 57.5 kg (126 lb 12.8 oz)   08/22/17 57.5 kg (126 lb 12.8 oz)   09/29/16 56.7 kg (125 lb)              We Performed the Following     DERMATOLOGY REFERRAL          Today's Medication Changes          These changes are accurate as of 9/4/18  2:44 PM.  If you have any questions, ask your nurse or doctor.               These medicines have changed or have updated prescriptions.        Dose/Directions    amLODIPine 5 MG tablet   Commonly known as:  NORVASC   This may have changed:  See the new instructions.   Used for:  Essential hypertension with goal blood pressure less than 130/80, Hypertension secondary to other renal disorders   Changed  by:  Recipient, [facility]Pancreas        Dose:  5 mg   Take 1 tablet (5 mg) by mouth At Bedtime   Quantity:  90 tablet   Refills:  3       mycophenolate 250 MG capsule   Commonly known as:  GENERIC EQUIVALENT   This may have changed:  how much to take   Used for:  Kidney replaced by transplant   Changed by:  Recipient, [facility]Pancreas        Dose:  750 mg   Take 3 capsules (750 mg) by mouth 2 times daily   Quantity:  180 capsule   Refills:  11         Stop taking these medicines if you haven't already. Please contact your care team if you have questions.     aspirin 81 MG tablet   Stopped by:  Recipient, [facility]                Where to get your medicines      These medications were sent to [facility] - [address], [facility] [address]     Phone:  [phone]     amLODIPine 5 MG tablet    mycophenolate 250 MG capsule    sulfamethoxazole-trimethoprim 400-80 MG per tablet    tacrolimus 1 MG capsule                Primary Care Provider Office Phone # Fax #    Kathleen ALBRECHT Shahid [phone]       [place] [id number]        Equal Access to Services     [facility]: Hadii aad ku hadasho Soomaali, waaxda luqadaha, qaybta kaalmada adeegyaamanuel, maine warner . So [facility] [phone].    ATENCIÓN: Si habla español, tiene a lucas disposición servicios gratuitos de asistencia lingüística. [facility] [phone].    We comply with applicable federal civil rights laws and [place] laws. We do not discriminate on the basis of race, color, national origin, age, disability, sex, sexual orientation, or gender identity.            Thank you!     Thank you for choosing [facility] NEPHROLOGY  for your care. Our goal is always to provide you with excellent care. Hearing back from our patients is one way we can continue to improve our services. Please take a few minutes to  complete the written survey that you may receive in the mail after your visit with us. Thank you!             Your Updated Medication List - Protect others around you: Learn how to safely use, store and throw away your medicines at www.disposemymeds.org.          This list is accurate as of 9/4/18  2:44 PM.  Always use your most recent med list.                   Brand Name Dispense Instructions for use Diagnosis    amLODIPine 5 MG tablet    NORVASC    90 tablet    Take 1 tablet (5 mg) by mouth At Bedtime    Essential hypertension with goal blood pressure less than 130/80, Hypertension secondary to other renal disorders       mycophenolate 250 MG capsule    GENERIC EQUIVALENT    180 capsule    Take 3 capsules (750 mg) by mouth 2 times daily    Kidney replaced by transplant       sulfamethoxazole-trimethoprim 400-80 MG per tablet    BACTRIM/SEPTRA    90 tablet    Take 1 tablet by mouth daily    Kidney transplanted, Kidney replaced by transplant       * tacrolimus 0.5 MG capsule    GENERIC EQUIVALENT    60 capsule    Hold    Kidney transplant recipient, Long-term use of immunosuppressant medication       * tacrolimus 1 MG capsule    GENERIC EQUIVALENT    60 capsule    Take 1 capsule (1 mg) by mouth 2 times daily    Kidney replaced by transplant       Vitamin D (Cholecalciferol) 1000 units Tabs     90 tablet    Take 1,000 Units by mouth daily    Secondary renal hyperparathyroidism (H), Kidney replaced by transplant       * Notice:  This list has 2 medication(s) that are the same as other medications prescribed for you. Read the directions carefully, and ask your doctor or other care provider to review them with you.

## 2018-09-04 NOTE — PROGRESS NOTES
TRANSPLANT NEPHROLOGY VISIT    Assessment & Plan   # LDKT: basline Cr ~ 0.8- 0.9; Stable   - Proteinuria: Normal    - BK PCR: Negative   - Recent DSA: No  Date last checked: 5/2018    # Immunosuppression: Tacrolimus immediate release (goal  4-6) and Mycophenolate mofetil (goal  1-3.5)   - Changes: No    # Prophylaxis:   - PJP: Bactrim    # Transplant History:    Transplant: 9/29/2015 (Kidney)    Donor Type: Living Donor Class:    Crossmatch at time of Tx: negative    DSA at time of Tx: Yes    Significant changes in immunosuppression: None    Biopsy: No Rejection History:No   History of BK Viremia: No    Significant Complications: None    Transplant Office Phone Number: [phone]    # Hypertension: controlled; Goal BP: 140/90   - Changes: No    # Anemia in chronic renal disease: Hgb: Stable   - Iron studies: replete in the past    # Mineral Bone Disorder:   - Vitamin D: normal vitamin D level and will continue on cholecalciferol.    # Skin cancer screening: will refer to Dermatology.     Return visit: 1 year    Assessment and plan was discussed with the patient and she voiced her understanding and agreement.    Shaheed Graff MD     Attestation:  This patient has been seen and evaluated by me, Maxim Brooke MD.  I have reviewed the note and agree with plan of care as documented by the fellow.       Chief Complaint   Ms. 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    She was last seen in clinic on 8/2017. She has not had any major illnesses or hospitalization. She has no complaints today. Appetite is good with stable weight.  No nausea, vomiting or diarrhea.  No fever, sweats or chills.  No leg swelling.  No pain or burning with urination.    Recent Hospitalizations:  [x] No [] Yes    New Medical Issues: [x] No [] Yes    Decreased energy: [x] No [] Yes    Chest pain or SOB with  exertion:  [x] No [] Yes    Appetite change or weight change: [x] No [] Yes    Nausea, vomiting or diarrhea:  [x] No [] Yes    Fever, sweats or chills: [x] No [] Yes    Leg swelling: [x] No [] Yes      Other medical issues:  No    Home BP: Usually in 120/70s    Review of Systems   A comprehensive review of systems was obtained and negative, except as noted in the HPI or PMH.    Problem List   Patient Active Problem List   Diagnosis     Polycystic kidney disease     Hypertension secondary to other renal disorders     Vitamin D deficiency     Dyslipidemia     Polycystic liver disease     Kidney replaced by transplant     Immunosuppressed status (H)     Thrombocytopenia (H)     Steroid-induced hyperglycemia     Aftercare following organ transplant       Social History   Social History   Substance Use Topics     Smoking status: Never Smoker     Smokeless tobacco: Never Used     Alcohol use 0.5 oz/week     1 Standard drinks or equivalent per week       Allergies   Allergies   Allergen Reactions     Contrast Dye Unknown     Mold Unknown       Medications   Current Outpatient Prescriptions   Medication Sig     amLODIPine (NORVASC) 5 MG tablet TAKE 1 TABLET BY MOUTH  DAILY     mycophenolate (GENERIC EQUIVALENT) 250 MG capsule Take 4 capsules (1,000 mg) by mouth 2 times daily     sulfamethoxazole-trimethoprim (BACTRIM/SEPTRA) 400-80 MG per tablet Take 1 tablet by mouth daily     tacrolimus (GENERIC EQUIVALENT) 0.5 MG capsule Hold     tacrolimus (GENERIC EQUIVALENT) 1 MG capsule Take 1 capsule (1 mg) by mouth 2 times daily     Vitamin D, Cholecalciferol, 1000 UNITS TABS Take 1,000 Units by mouth daily     aspirin 81 MG tablet Take 1 tablet (81 mg) by mouth daily (Patient not taking: Reported on 9/4/2018)     No current facility-administered medications for this visit.      There are no discontinued medications.    Physical Exam   Vital Signs: /84 (BP Location: Right arm)  Pulse 89  Temp 98.3  F (36.8  C) (Oral)  Wt  57.5 kg (126 lb 12.8 oz)  SpO2 97%  BMI 24.56 kg/m2     GENERAL APPEARANCE: alert and no distress  HENT: mouth without ulcers or lesions  LYMPHATICS: no cervical or supraclavicular nodes  RESP: lungs clear to auscultation - no rales, rhonchi or wheezes  CV: regular rhythm, normal rate, no rub, no murmur  EDEMA: no LE edema bilaterally  ABDOMEN: soft, nondistended, nontender, bowel sounds normal  MS: extremities normal - no gross deformities noted, no evidence of inflammation in joints, no muscle tenderness  SKIN: no rash  TX KIDNEY: normal    Data   Renal -   Recent Labs   Lab Test  08/02/18   0956  05/31/18   0909  12/27/17   0910   NA  139  140  137   POTASSIUM  3.9  4.0  3.9   CHLORIDE  107  107  104   CO2  26  28  25   BUN  14  12  15   CR  0.70  0.71  0.71   GLC  93  103*  110*   KIM  8.8  9.0  9.0     Recent Labs   Lab Test  03/22/16   0908  12/28/15   0917  12/21/15   0909  12/14/15   0903   MAG  1.8  1.7  1.5*  1.4*   PHOS   --   3.5  3.1  2.9     Recent Labs   Lab Test  10/17/16   0934  10/01/15   0720   PTHI  42  301*     Recent Labs   Lab Test  09/18/17   0919  10/17/16   0934  10/04/15   0538   VITDT  34  29  17*       CBC -   Recent Labs   Lab Test  09/04/18   0901  08/02/18   0956  05/31/18   0909   WBC  3.2*  3.4*  3.5*   HGB  13.5  13.5  13.1   PLT  88*  95*  95*       LFTs -   Recent Labs   Lab Test  04/25/16   0908  09/28/15   0740  01/26/15   0708   ALKPHOS  191*  342*  209*   BILITOTAL  0.5  0.3  0.3   ALT  36  68*  31   AST  27  45  22   PROTTOTAL  6.7*  6.9  7.0   ALBUMIN  3.5  3.6  3.5       Pancreas -  Recent Labs   Lab Test  09/30/15   0635   A1C  5.5     No lab results found.    Iron Panel -   Recent Labs   Lab Test  10/08/15   0709   IRON  69   IRONSAT  28   MAXIMO  865*       Transplant -   Recent Labs   Lab Test  09/02/16   0902  08/08/16   0905  07/18/16   0912   CSPEC  Plasma  Plasma  Plasma   CMVQNT  CMV DNA Not Detected   The MADDISON AmpliPrep/MADDISON TaqMan CMV Test is an FDA-approved in  vitro nucleic   acid amplification test for the quantitation of cytomegalovirus DNA in human   plasma (EDTA plasma) using the M[employer] AmpliPrep Instrument for automated viral   nucleic acid extraction and the M[employer]ON TaqMan Analyzer or MADDISON TaqMan for   automated Real Time amplification and detection of the viral nucleic acid   target.   Titer results are reported in International Units/mL (IU/mL using 1st WHO   International standard for Human Cytomegalovirus for Nucleic Acid Amplification   based assays. The conversion factor between CMV DNA copis/mL (as defined by the   Roche MADDISON TaqMan CMV test) and International Units is the CMV DNA   concentration in IU/mL x 1.1 copies/IU = CMV DNA in copies/mL.   This assay has received FDA approval for the testing of human plasma only. The   [facility] Diagnostic Laboratory at the [facility], [place], has validated the pe rformance characteristics of the Roche   CMV assay for plasma, bronchial alveolar lavage/wash and urine.    CMV DNA Not Detected   The MADDISON AmpliPrep/MADDISON TaqMan CMV Test is an FDA-approved in vitro nucleic   acid amplification test for the quantitation of cytomegalovirus DNA in human   plasma (EDTA plasma) using the MADDISON AmpliPrep Instrument for automated viral   nucleic acid extraction and the MADDISON TaqMan Analyzer or MADDISON TaqMan for   automated Real Time amplification and detection of the viral nucleic acid   target.   Titer results are reported in International Units/mL (IU/mL using 1st WHO   International standard for Human Cytomegalovirus for Nucleic Acid Amplification   based assays. The conversion factor between CMV DNA copis/mL (as defined by the   Roche MADDISON TaqMan CMV test) and International Units is the CMV DNA   concentration in IU/mL x 1.1 copies/IU = CMV DNA in copies/mL.   This assay has received FDA approval for the testing of human plasma only. The   Infectious Disease Diagnostic Laboratory at the  [facility], has validated the pe rformance characteristics of the Roche   CMV assay for plasma, bronchial alveolar lavage/wash and urine.    CMV DNA Not Detected   The MADDISON AmpliPrep/MADDISON TaqMan CMV Test is an FDA-approved in vitro nucleic   acid amplification test for the quantitation of cytomegalovirus DNA in human   plasma (EDTA plasma) using the MADDISON AmpliPrep Instrument for automated viral   nucleic acid extraction and the MADDISON TaqMan Analyzer or MADDISON TaqMan for   automated Real Time amplification and detection of the viral nucleic acid   target.   Titer results are reported in International Units/mL (IU/mL using 1st WHO   International standard for Human Cytomegalovirus for Nucleic Acid Amplification   based assays. The conversion factor between CMV DNA copis/mL (as defined by the   Roche MADDISON TaqMan CMV test) and International Units is the CMV DNA   concentration in IU/mL x 1.1 copies/IU = CMV DNA in copies/mL.   This assay has received FDA approval for the testing of human plasma only. The   [facility] Diagnostic Laboratory at the [facility], [place], has validated the pe rformance characteristics of the Roche   CMV assay for plasma, bronchial alveolar lavage/wash and urine.     CMVLOG  Not Calculated  Not Calculated  Not Calculated     Recent Labs   Lab Test  09/02/16   0902  08/08/16   0905  07/18/16   0912   EBRES  EBV DNA Not Detected  EBV DNA Not Detected  EBV DNA Not Detected   EBLOG  Not Calculated   The Real-Time quantitative EBV assay was developed and its performance   characteristics determined by the Infect[facility] Diseases Diagnostic Laboratory at   the [facility] in [place], [place].  The   primers and probes are Analyte Specific Reagents (ASRs) manufactured  by   Qiagen.   ASRs are used in many laboratory tests necessary for standard medical care and   generally do not require  U.S. Food and Drug Administration approval.  The FDA   has determined that such clearance or approval is not necessary.  This test is   used for clinical purposes.  It should not be regarded as investigational or   research.   This laboratory is certified under the Clinical Laboratory Improvement   Amendments of 1988 (CLIA-88) as qualified to perform high complexity clinical   laboratory testing.   The quantitative range of this assay is 500-22,500,00 copies/mL (2.7-7.4 log   copies/mL).  A negative result does not rule out the presence of PCR inhibitors    in the patient specimen or EBV DNA nucleic acid in concentrations below the   level of detection of the assay.  Inhibition may also lead to underestimation   of viral quantitation.    Not Calculated   The Real-Time quantitative EBV assay was developed and its performance   characteristics determined by the Infectious Diseases Diagnostic Laboratory at   the [facility] in [place], [place].  The   primers and probes are Analyte Specific Reagents (ASRs) manufactured  by   [employer].   ASRs are used in many laboratory tests necessary for standard medical care and   generally do not require U.S. Food and Drug Administration approval.  The FDA   has determined that such clearance or approval is not necessary.  This test is   used for clinical purposes.  It should not be regarded as investigational or   research.   This laboratory is certified under the Clinical Laboratory Improvement   Amendments of 1988 (CLIA-88) as qualified to perform high complexity clinical   laboratory testing.   The quantitative range of this assay is 500-22,500,00 copies/mL (2.7-7.4 log   copies/mL).  A negative result does not rule out the presence of PCR inhibitors    in the patient specimen or EBV DNA nucleic acid in concentrations below the   level of detection of the assay.  Inhibition may also lead to underestimation   of viral quantitation.    Not  Calculated   The Real-Time quantitative EBV assay was developed and its performance   characteristics determined by the Infectious Diseases Diagnostic Laboratory at   the [facility] in [place], [place].  The   primers and probes are Analyte Specific Reagents (ASRs) manufactured  by   [employer].   ASRs are used in many laboratory tests necessary for standard medical care and   generally do not require U.S. Food and Drug Administration approval.  The FDA   has determined that such clearance or approval is not necessary.  This test is   used for clinical purposes.  It should not be regarded as investigational or   research.   This laboratory is certified under the Clinical Laboratory Improvement   Amendments of 1988 (CLIA-88) as qualified to perform high complexity clinical   laboratory testing.   The quantitative range of this assay is 500-22,500,00 copies/mL (2.7-7.4 log   copies/mL).  A negative result does not rule out the presence of PCR inhibitors    in the patient specimen or EBV DNA nucleic acid in concentrations below the   level of detection of the assay.  Inhibition may also lead to underestimation   of viral quantitation.       Recent Labs   Lab Test  05/31/18   0909  12/27/17   0910  06/27/16   0903   BKRES  BK Virus DNA Not Detected  BK Virus DNA Not Detected  BK Virus DNA Not Detected   BKLOG  Not Calculated  Not Calculated  Not Calculated   The Real-Time quantitative BK Virus assay was developed and its performance   characteristics determined by the Infect[facility] Diseases Diagnostic Laboratory at   the [facility] in [place], [place]. The   primers and probes for each analyte are Analyte Specific Reagents (ASRs)   manufactured by [employer].   ASRs are used in many laboratory tests necessary for standard medical care and   generally do not require U.S. Food and Drug Administration approval. The FDA   has determined that such clearance or approval  is not necessary.   This test is used for clinical purposes. It should not be regarded as   investigational or for research. This laboratory is certified under the   Clinical Laboratory Improvement Amendments of 1988 (CLIA-88) as qualified to   perform high complexity clinical laboratory testing.         Recent Labs   Lab Test  08/02/18   0958  05/31/18   0910  12/27/17   0910   DOSTAC  2145 8/1/18  2200 5/31/18  2100 12/26/17   TACROL  4.6*  6.6  6.5     Recent Labs   Lab Test  10/17/16   0935  12/28/15   0917  12/21/15   0909   DOSMPA  ld 10/16/16 2100  LD 12/27/2015 2100  LD 12/20/15 2100   MPACID  5.02*  4.66*  2.66   MPAG  51.7  37.3  31.8

## 2018-09-14 DIAGNOSIS — Z94.0 KIDNEY REPLACED BY TRANSPLANT: ICD-10-CM

## 2018-09-14 RX ORDER — MYCOPHENOLATE MOFETIL 250 MG/1
750 CAPSULE ORAL 2 TIMES DAILY
Qty: 180 CAPSULE | Refills: 11 | Status: SHIPPED | OUTPATIENT
Start: 2018-09-14 | End: 2019-10-14

## 2018-09-14 RX ORDER — TACROLIMUS 1 MG/1
1 CAPSULE ORAL 2 TIMES DAILY
Qty: 60 CAPSULE | Refills: 11 | Status: SHIPPED | OUTPATIENT
Start: 2018-09-14 | End: 2019-09-16

## 2018-10-29 DIAGNOSIS — Z79.60 LONG-TERM USE OF IMMUNOSUPPRESSANT MEDICATION: ICD-10-CM

## 2018-10-29 DIAGNOSIS — Z94.0 KIDNEY TRANSPLANT RECIPIENT: Primary | ICD-10-CM

## 2018-10-29 RX ORDER — TACROLIMUS 0.5 MG/1
0.5 CAPSULE ORAL 2 TIMES DAILY
Qty: 60 CAPSULE | Refills: 11 | Status: SHIPPED | OUTPATIENT
Start: 2018-10-29 | End: 2019-09-16

## 2019-03-04 ENCOUNTER — TELEPHONE (OUTPATIENT)
Dept: TRANSPLANT | Facility: CLINIC | Age: 64
End: 2019-03-04

## 2019-03-04 DIAGNOSIS — Z48.298 AFTERCARE FOLLOWING ORGAN TRANSPLANT: ICD-10-CM

## 2019-03-04 DIAGNOSIS — Z94.0 KIDNEY REPLACED BY TRANSPLANT: ICD-10-CM

## 2019-03-04 DIAGNOSIS — Z79.899 ENCOUNTER FOR LONG-TERM CURRENT USE OF MEDICATION: ICD-10-CM

## 2019-03-04 DIAGNOSIS — Z48.298 AFTERCARE FOLLOWING ORGAN TRANSPLANT: Primary | ICD-10-CM

## 2019-03-04 LAB
ANION GAP SERPL CALCULATED.3IONS-SCNC: 6 MMOL/L (ref 3–14)
BUN SERPL-MCNC: 12 MG/DL (ref 7–30)
CALCIUM SERPL-MCNC: 8.5 MG/DL (ref 8.5–10.1)
CHLORIDE SERPL-SCNC: 105 MMOL/L (ref 94–109)
CO2 SERPL-SCNC: 27 MMOL/L (ref 20–32)
CREAT SERPL-MCNC: 0.72 MG/DL (ref 0.52–1.04)
ERYTHROCYTE [DISTWIDTH] IN BLOOD BY AUTOMATED COUNT: 13.1 % (ref 10–15)
GFR SERPL CREATININE-BSD FRML MDRD: 90 ML/MIN/{1.73_M2}
GLUCOSE SERPL-MCNC: 106 MG/DL (ref 70–99)
HCT VFR BLD AUTO: 41.8 % (ref 35–47)
HGB BLD-MCNC: 13.7 G/DL (ref 11.7–15.7)
MCH RBC QN AUTO: 28.5 PG (ref 26.5–33)
MCHC RBC AUTO-ENTMCNC: 32.8 G/DL (ref 31.5–36.5)
MCV RBC AUTO: 87 FL (ref 78–100)
PLATELET # BLD AUTO: 90 10E9/L (ref 150–450)
POTASSIUM SERPL-SCNC: 3.6 MMOL/L (ref 3.4–5.3)
RBC # BLD AUTO: 4.8 10E12/L (ref 3.8–5.2)
SODIUM SERPL-SCNC: 138 MMOL/L (ref 133–144)
WBC # BLD AUTO: 2.1 10E9/L (ref 4–11)

## 2019-03-04 PROCEDURE — 80048 BASIC METABOLIC PNL TOTAL CA: CPT | Performed by: INTERNAL MEDICINE

## 2019-03-04 PROCEDURE — 85027 COMPLETE CBC AUTOMATED: CPT | Performed by: INTERNAL MEDICINE

## 2019-03-04 PROCEDURE — 80197 ASSAY OF TACROLIMUS: CPT | Performed by: INTERNAL MEDICINE

## 2019-03-04 PROCEDURE — 36415 COLL VENOUS BLD VENIPUNCTURE: CPT | Performed by: INTERNAL MEDICINE

## 2019-03-04 NOTE — TELEPHONE ENCOUNTER
Provider Call: Transplant Lab/Orders  Route to LPN  Post Transplant Days: 1252  When patient is less than 60 days post-transplant, route high priority  Reason for Call: Annual lab reorder  Liver patients reporting abnormal lab results: Route to RN and Page  Document lab facility information when provider is calling about annual lab orders. Delete facility wildcards when not needed.  Facility Name:   Facility Location: [facility]  Outside Facility - Place orders in [facility]- Specimen waiting for orders   Callback needed? No

## 2019-03-05 ENCOUNTER — TELEPHONE (OUTPATIENT)
Dept: TRANSPLANT | Facility: CLINIC | Age: 64
End: 2019-03-05

## 2019-03-05 LAB
TACROLIMUS BLD-MCNC: 5.4 UG/L (ref 5–15)
TME LAST DOSE: NORMAL H

## 2019-03-05 NOTE — TELEPHONE ENCOUNTER
ISSUE:  Slight trend down in WBC, now at 2.1  Not obtaining monthly labs.     PLAN:  Call placed to pt. No answer. Left detailed vm asking that she return call.

## 2019-03-05 NOTE — TELEPHONE ENCOUNTER
"Call returned from pt. She does state that she's been fighting off some \"type of URI\" for the past two weeks. I advised that she be further evaluated by PCP or urgent care, as she may need abx. Keri has also been educated about the importance of monthly labs to follow organ function.   Pt verbalizes understanding of plan    "

## 2019-03-05 NOTE — TELEPHONE ENCOUNTER
Patient Call: General    Reason for call: patient returning call.    Call back needed? Yes    Return Call Needed  Same as documented in contacts section  When to return call?: Same day: Route High Priority

## 2019-07-15 DIAGNOSIS — Z48.298 AFTERCARE FOLLOWING ORGAN TRANSPLANT: ICD-10-CM

## 2019-07-15 DIAGNOSIS — Z94.0 KIDNEY REPLACED BY TRANSPLANT: ICD-10-CM

## 2019-07-15 DIAGNOSIS — Z79.899 ENCOUNTER FOR LONG-TERM CURRENT USE OF MEDICATION: ICD-10-CM

## 2019-07-15 LAB
ANION GAP SERPL CALCULATED.3IONS-SCNC: 6 MMOL/L (ref 3–14)
BUN SERPL-MCNC: 13 MG/DL (ref 7–30)
CALCIUM SERPL-MCNC: 9.3 MG/DL (ref 8.5–10.1)
CHLORIDE SERPL-SCNC: 109 MMOL/L (ref 94–109)
CO2 SERPL-SCNC: 27 MMOL/L (ref 20–32)
CREAT SERPL-MCNC: 0.74 MG/DL (ref 0.52–1.04)
CREAT UR-MCNC: 133 MG/DL
ERYTHROCYTE [DISTWIDTH] IN BLOOD BY AUTOMATED COUNT: 12.9 % (ref 10–15)
GFR SERPL CREATININE-BSD FRML MDRD: 86 ML/MIN/{1.73_M2}
GLUCOSE SERPL-MCNC: 77 MG/DL (ref 70–99)
HCT VFR BLD AUTO: 41.2 % (ref 35–47)
HGB BLD-MCNC: 13.8 G/DL (ref 11.7–15.7)
MCH RBC QN AUTO: 29.1 PG (ref 26.5–33)
MCHC RBC AUTO-ENTMCNC: 33.5 G/DL (ref 31.5–36.5)
MCV RBC AUTO: 87 FL (ref 78–100)
PLATELET # BLD AUTO: 102 10E9/L (ref 150–450)
POTASSIUM SERPL-SCNC: 4.1 MMOL/L (ref 3.4–5.3)
PROT UR-MCNC: 0.18 G/L
PROT/CREAT 24H UR: 0.13 G/G CR (ref 0–0.2)
RBC # BLD AUTO: 4.74 10E12/L (ref 3.8–5.2)
SODIUM SERPL-SCNC: 142 MMOL/L (ref 133–144)
WBC # BLD AUTO: 4 10E9/L (ref 4–11)

## 2019-07-15 PROCEDURE — 85027 COMPLETE CBC AUTOMATED: CPT | Performed by: INTERNAL MEDICINE

## 2019-07-15 PROCEDURE — 36415 COLL VENOUS BLD VENIPUNCTURE: CPT | Performed by: INTERNAL MEDICINE

## 2019-07-15 PROCEDURE — 80197 ASSAY OF TACROLIMUS: CPT | Performed by: INTERNAL MEDICINE

## 2019-07-15 PROCEDURE — 80048 BASIC METABOLIC PNL TOTAL CA: CPT | Performed by: INTERNAL MEDICINE

## 2019-07-15 PROCEDURE — 84156 ASSAY OF PROTEIN URINE: CPT | Performed by: INTERNAL MEDICINE

## 2019-07-16 LAB
TACROLIMUS BLD-MCNC: 4.4 UG/L (ref 5–15)
TME LAST DOSE: ABNORMAL H

## 2019-08-24 DIAGNOSIS — I10 ESSENTIAL HYPERTENSION WITH GOAL BLOOD PRESSURE LESS THAN 130/80: ICD-10-CM

## 2019-08-24 DIAGNOSIS — I15.1 HYPERTENSION SECONDARY TO OTHER RENAL DISORDERS: ICD-10-CM

## 2019-08-26 RX ORDER — AMLODIPINE BESYLATE 5 MG/1
TABLET ORAL
Qty: 90 TABLET | Refills: 3 | Status: SHIPPED | OUTPATIENT
Start: 2019-08-26 | End: 2019-10-14

## 2019-09-06 ENCOUNTER — DOCUMENTATION ONLY (OUTPATIENT)
Dept: TRANSPLANT | Facility: CLINIC | Age: 64
End: 2019-09-06

## 2019-09-06 DIAGNOSIS — Z79.899 ENCOUNTER FOR LONG-TERM CURRENT USE OF MEDICATION: ICD-10-CM

## 2019-09-06 DIAGNOSIS — Z48.298 AFTERCARE FOLLOWING ORGAN TRANSPLANT: ICD-10-CM

## 2019-09-06 DIAGNOSIS — Z94.0 KIDNEY REPLACED BY TRANSPLANT: ICD-10-CM

## 2019-09-06 LAB
ANION GAP SERPL CALCULATED.3IONS-SCNC: 4 MMOL/L (ref 3–14)
BUN SERPL-MCNC: 19 MG/DL (ref 7–30)
CALCIUM SERPL-MCNC: 9.3 MG/DL (ref 8.5–10.1)
CHLORIDE SERPL-SCNC: 106 MMOL/L (ref 94–109)
CO2 SERPL-SCNC: 29 MMOL/L (ref 20–32)
CREAT SERPL-MCNC: 0.7 MG/DL (ref 0.52–1.04)
ERYTHROCYTE [DISTWIDTH] IN BLOOD BY AUTOMATED COUNT: 12.8 % (ref 10–15)
GFR SERPL CREATININE-BSD FRML MDRD: >90 ML/MIN/{1.73_M2}
GLUCOSE SERPL-MCNC: 129 MG/DL (ref 70–99)
HCT VFR BLD AUTO: 40.8 % (ref 35–47)
HGB BLD-MCNC: 13.7 G/DL (ref 11.7–15.7)
MCH RBC QN AUTO: 29.4 PG (ref 26.5–33)
MCHC RBC AUTO-ENTMCNC: 33.6 G/DL (ref 31.5–36.5)
MCV RBC AUTO: 88 FL (ref 78–100)
PLATELET # BLD AUTO: 92 10E9/L (ref 150–450)
POTASSIUM SERPL-SCNC: 4.2 MMOL/L (ref 3.4–5.3)
RBC # BLD AUTO: 4.66 10E12/L (ref 3.8–5.2)
SODIUM SERPL-SCNC: 139 MMOL/L (ref 133–144)
TACROLIMUS BLD-MCNC: 3.9 UG/L (ref 5–15)
TME LAST DOSE: ABNORMAL H
WBC # BLD AUTO: 2.9 10E9/L (ref 4–11)

## 2019-09-06 PROCEDURE — 80048 BASIC METABOLIC PNL TOTAL CA: CPT | Performed by: INTERNAL MEDICINE

## 2019-09-06 PROCEDURE — 36415 COLL VENOUS BLD VENIPUNCTURE: CPT | Performed by: INTERNAL MEDICINE

## 2019-09-06 PROCEDURE — 80197 ASSAY OF TACROLIMUS: CPT | Performed by: INTERNAL MEDICINE

## 2019-09-06 PROCEDURE — 85027 COMPLETE CBC AUTOMATED: CPT | Performed by: INTERNAL MEDICINE

## 2019-09-06 NOTE — PROGRESS NOTES
Chart Prep    Clinic Visit on: 9/9/19    Last lab completed:7/15/19    Lab letter updated: 9/3/19    Lab orders up to date in Epic.

## 2019-09-09 ENCOUNTER — OFFICE VISIT (OUTPATIENT)
Dept: NEPHROLOGY | Facility: CLINIC | Age: 64
End: 2019-09-09
Attending: INTERNAL MEDICINE
Payer: COMMERCIAL

## 2019-09-09 VITALS
OXYGEN SATURATION: 99 % | WEIGHT: 129.8 LBS | BODY MASS INDEX: 25.14 KG/M2 | TEMPERATURE: 98 F | DIASTOLIC BLOOD PRESSURE: 84 MMHG | SYSTOLIC BLOOD PRESSURE: 130 MMHG | HEART RATE: 99 BPM

## 2019-09-09 DIAGNOSIS — Z48.298 AFTERCARE FOLLOWING ORGAN TRANSPLANT: ICD-10-CM

## 2019-09-09 DIAGNOSIS — Z94.0 KIDNEY REPLACED BY TRANSPLANT: Primary | ICD-10-CM

## 2019-09-09 DIAGNOSIS — D84.9 IMMUNOSUPPRESSION (H): ICD-10-CM

## 2019-09-09 DIAGNOSIS — E55.9 VITAMIN D DEFICIENCY: ICD-10-CM

## 2019-09-09 DIAGNOSIS — D69.6 THROMBOCYTOPENIA (H): ICD-10-CM

## 2019-09-09 DIAGNOSIS — Z79.899 ENCOUNTER FOR LONG-TERM CURRENT USE OF MEDICATION: ICD-10-CM

## 2019-09-09 DIAGNOSIS — I15.1 HTN, KIDNEY TRANSPLANT RELATED: ICD-10-CM

## 2019-09-09 DIAGNOSIS — L65.9 ALOPECIA: ICD-10-CM

## 2019-09-09 DIAGNOSIS — Z94.0 HTN, KIDNEY TRANSPLANT RELATED: ICD-10-CM

## 2019-09-09 PROCEDURE — G0463 HOSPITAL OUTPT CLINIC VISIT: HCPCS | Mod: ZF

## 2019-09-09 ASSESSMENT — PAIN SCALES - GENERAL: PAINLEVEL: NO PAIN (0)

## 2019-09-09 NOTE — PROGRESS NOTES
CHRONIC TRANSPLANT NEPHROLOGY VISIT    Assessment & Plan   # LDKT: Stable   - Baseline Cr ~ 0.7-0.9   - Proteinuria: Normal (<0.2 grams)   - Date DSA Last Checked: May/2018       Latest DSA: No   - BK Viremia: No   - Kidney Tx Biopsy: No    # Immunosuppression: Tacrolimus immediate release (goal 4-6) and Mycophenolate mofetil (goal not followed)   - Changes: Yes - With trend down in tacrolimus level, most recently below goal, will increase tacrolimus to 1.5 mg bid.    # Prophylaxis:   - PJP: Sulfa/TMP (Bactrim)    # Hypertension: Controlled; Goal BP: < 130/80   - Changes: No    # Mineral Bone Disorder:   - Vitamin D; level: Not checked recently on supplements. Will recheck vitamin D level with next labs.   - Calcium; level: Normal    # Thrombocytopenia: Stable platelet count running ~ 's.    # Alopecia: Mostly stable.  Felt likely at least partly due to tacrolimus, but patient isn't interested in changing immunosuppression.    # Skin Cancer Risk:    - Discussed sun protection and recommend regular follow up with Dermatology.    # Medical Compliance: No.  Evidence of labs less than recommended.  - Discussed importance of checking labs regularly as recommended, taking medications as prescribed and attending scheduled medical appointments.    # Transplant History:  Etiology of kidney failure: Polycystic kidney disease (PKD)  Tx: LDKT  Transplant: 9/29/2015 (Kidney)  Donor Type: Living Donor Class:   Significant changes in immunosuppression: None  Significant transplant-related complications: None    Transplant Office Phone Number: [phone]    Assessment and plan was discussed with the patient and she voiced her understanding and agreement.    Return visit: Return in about 1 year (around 9/9/2020).    Chief Complaint   Ms. Navarro is a 63 year old here for routine follow up.    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     She was  last seen in clinic on 9/4/2018. During this time no changes were made to her immunosuppression. She has not had any major illnesses or hospitalization. Energy levels are ok and she stays active with some exercise. No chest pain or shortness of breath with exertion. Appetite is good. No nausea, vomiting, or diarrhea. No fever, sweats or chills. Some minor hair loss since last visit. She has not been to the dermatologist. Blood pressures run 120's/70's at home.     Recent Hospitalizations:  [x] No [] Yes    New Medical Issues: [x] No [] Yes    Decreased energy: [x] No [] Yes    Chest pain or SOB with exertion:  [x] No [] Yes    Appetite change or weight change: [x] No [] Yes    Nausea, vomiting or diarrhea:  [x] No [] Yes    Fever, sweats or chills: [x] No [] Yes    Leg swelling: [x] No [] Yes      Home BP: 120's/70's     Review of Systems   A comprehensive review of systems was obtained and negative, except as noted in the HPI or PMH.    Problem List   Patient Active Problem List   Diagnosis     Polycystic kidney disease     HTN, kidney transplant related     Vitamin D deficiency     Dyslipidemia     Polycystic liver disease     Kidney replaced by transplant     Immunosuppression (H)     Thrombocytopenia (H)     Steroid-induced hyperglycemia     Aftercare following organ transplant     Alopecia       Social History   Social History     Tobacco Use     Smoking status: Never Smoker     Smokeless tobacco: Never Used   Substance Use Topics     Alcohol use: Yes     Alcohol/week: 0.5 oz     Types: 1 Standard drinks or equivalent per week     Drug use: No       Allergies   Allergies   Allergen Reactions     Contrast Dye Unknown     Mold Unknown       Medications   Current Outpatient Medications   Medication Sig     amLODIPine (NORVASC) 5 MG tablet TAKE 1 TABLET BY MOUTH AT  BEDTIME     mycophenolate (GENERIC EQUIVALENT) 250 MG capsule Take 3 capsules (750 mg) by mouth 2 times daily     sulfamethoxazole-trimethoprim  (BACTRIM/SEPTRA) 400-80 MG per tablet Take 1 tablet by mouth daily     tacrolimus (GENERIC EQUIVALENT) 1 MG capsule Take 1 capsule (1 mg) by mouth 2 times daily     Vitamin D, Cholecalciferol, 1000 UNITS TABS Take 1,000 Units by mouth daily     tacrolimus (GENERIC EQUIVALENT) 0.5 MG capsule Take 1 capsule (0.5 mg) by mouth 2 times daily (Patient not taking: Reported on 9/9/2019)     No current facility-administered medications for this visit.      Medications Discontinued During This Encounter   Medication Reason     tacrolimus (GENERIC EQUIVALENT) 0.5 MG capsule        Physical Exam   Vital Signs: /84   Pulse 99   Temp 98  F (36.7  C)   Wt 58.9 kg (129 lb 12.8 oz)   SpO2 99%   BMI 25.14 kg/m      GENERAL APPEARANCE: alert and no distress  HENT: mouth without ulcers or lesions  LYMPHATICS: no cervical or supraclavicular nodes  RESP: lungs clear to auscultation - no rales, rhonchi or wheezes  CV: regular rhythm, normal rate, no rub, no murmur  EDEMA: no LE edema bilaterally  ABDOMEN: soft, nondistended, nontender, bowel sounds normal  MS: extremities normal - no gross deformities noted, no evidence of inflammation in joints, no muscle tenderness  SKIN: no rash  TX KIDNEY: normal  DIALYSIS ACCESS: None      Data     Renal Latest Ref Rng & Units 9/6/2019 7/15/2019 3/4/2019   Na 133 - 144 mmol/L 139 142 138   K 3.4 - 5.3 mmol/L 4.2 4.1 3.6   Cl 94 - 109 mmol/L 106 109 105   CO2 20 - 32 mmol/L 29 27 27   BUN 7 - 30 mg/dL 19 13 12   Cr 0.52 - 1.04 mg/dL 0.70 0.74 0.72   Glucose 70 - 99 mg/dL 129(H) 77 106(H)   Ca  8.5 - 10.1 mg/dL 9.3 9.3 8.5   Mg 1.6 - 2.3 mg/dL - - -     Bone Health Latest Ref Rng & Units 9/18/2017 10/17/2016 12/28/2015   Phos 2.5 - 4.5 mg/dL - - 3.5   PTHi 12 - 72 pg/mL - 42 -   Vit D Def 20 - 75 ug/L 34 29 -     Heme Latest Ref Rng & Units 9/6/2019 7/15/2019 3/4/2019   WBC 4.0 - 11.0 10e9/L 2.9(L) 4.0 2.1(L)   Hgb 11.7 - 15.7 g/dL 13.7 13.8 13.7   Plt 150 - 450 10e9/L 92(L) 102(L) 90(L)      Liver Latest Ref Rng & Units 4/25/2016 9/28/2015 1/26/2015   AP 40 - 150 U/L 191(H) 342(H) 209(H)   TBili 0.2 - 1.3 mg/dL 0.5 0.3 0.3   DBili 0.0 - 0.2 mg/dL 0.2 - -   ALT 0 - 50 U/L 36 68(H) 31   AST 0 - 45 U/L 27 45 22   Tot Protein 6.8 - 8.8 g/dL 6.7(L) 6.9 7.0   Albumin 3.4 - 5.0 g/dL 3.5 3.6 3.5     Pancreas Latest Ref Rng & Units 9/30/2015   A1C 4.3 - 6.0 % 5.5     Iron studies Latest Ref Rng & Units 10/8/2015   Iron 35 - 180 ug/dL 69   Iron sat 15 - 46 % 28   Ferritin 8 - 252 ng/mL 865(H)     [facility] Txp Virology Latest Ref Rng & Units 5/31/2018 12/27/2017 9/2/2016   CVM DNA Quant - - - Plasma   BK Spec - Plasma Plasma -   BK Res BKNEG:BK Virus DNA Not Detected copies/mL BK Virus DNA Not Detected BK Virus DNA Not Detected -   BK Log <2.7 Log copies/mL Not Calculated Not Calculated -   Hep B Core NR - - -        Recent Labs   Lab Test 03/04/19  0930 07/15/19  0944 09/06/19  0905   DOSTAC 2130 3/4/19 2130 7/14/19 2100 9/6/19   TACROL 5.4 4.4* 3.9*     Recent Labs   Lab Test 12/21/15  0909 12/28/15  0917 10/17/16  0935   DOSMPA LD 12/20/15 2100 LD 12/27/2015 2100 ld 10/16/16 2100   MPACID 2.66 4.66* 5.02*   MPAG 31.8 37.3 51.7     Scribe Disclosure:  ILow, am serving as a scribe to document services personally performed by Kidney/Pancreas Recipient at this visit, based upon the provider's statements to me. All documentation has been reviewed by the aforementioned provider prior to being entered into the official medical record.     Low VEE, a scribe, prepared the chart for today's encounter.

## 2019-09-09 NOTE — LETTER
Date:September 10, 2019      Patient was self referred, no letter generated. Do not send.        [facility] Information

## 2019-09-09 NOTE — LETTER
9/9/2019      RE: Maritza Navarro  [address] [facility] [address]       CHRONIC TRANSPLANT NEPHROLOGY VISIT    Assessment & Plan   # LDKT: Stable   - Baseline Cr ~ 0.7-0.9   - Proteinuria: Normal (<0.2 grams)   - Date DSA Last Checked: May/2018       Latest DSA: No   - BK Viremia: No   - Kidney Tx Biopsy: No    # Immunosuppression: Tacrolimus immediate release (goal 4-6) and Mycophenolate mofetil (goal not followed)   - Changes: Yes - With trend down in tacrolimus level, most recently below goal, will increase tacrolimus to 1.5 mg bid.    # Prophylaxis:   - PJP: Sulfa/TMP (Bactrim)    # Hypertension: Controlled; Goal BP: < 130/80   - Changes: No    # Mineral Bone Disorder:   - Vitamin D; level: Not checked recently on supplements. Will recheck vitamin D level with next labs.   - Calcium; level: Normal    # Thrombocytopenia: Stable platelet count running ~ 's.    # Alopecia: Mostly stable.  Felt likely at least partly due to tacrolimus, but patient isn't interested in changing immunosuppression.    # Skin Cancer Risk:    - Discussed sun protection and recommend regular follow up with Dermatology.    # Medical Compliance: No.  Evidence of labs less than recommended.  - Discussed importance of checking labs regularly as recommended, taking medications as prescribed and attending scheduled medical appointments.    # Transplant History:  Etiology of kidney failure: Polycystic kidney disease (PKD)  Tx: LDKT  Transplant: 9/29/2015 (Kidney)  Donor Type: Living Donor Class:   Significant changes in immunosuppression: None  Significant transplant-related complications: None    Transplant Office Phone Number: [phone]    Assessment and plan was discussed with the patient and she voiced her understanding and agreement.    Return visit: Return in about 1 year (around 9/9/2020).    Chief Complaint   Ms. Navarro is a 63 year old here for routine follow up.    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     She was last seen in clinic on  9/4/2018. During this time no changes were made to her immunosuppression. She has not had any major illnesses or hospitalization. Energy levels are ok and she stays active with some exercise. No chest pain or shortness of breath with exertion. Appetite is good. No nausea, vomiting, or diarrhea. No fever, sweats or chills. Some minor hair loss since last visit. She has not been to the dermatologist. Blood pressures run 120's/70's at home.     Recent Hospitalizations:  [x] No [] Yes    New Medical Issues: [x] No [] Yes    Decreased energy: [x] No [] Yes    Chest pain or SOB with exertion:  [x] No [] Yes    Appetite change or weight change: [x] No [] Yes    Nausea, vomiting or diarrhea:  [x] No [] Yes    Fever, sweats or chills: [x] No [] Yes    Leg swelling: [x] No [] Yes      Home BP: 120's/70's     Review of Systems   A comprehensive review of systems was obtained and negative, except as noted in the HPI or PMH.    Problem List   Patient Active Problem List   Diagnosis     Polycystic kidney disease     HTN, kidney transplant related     Vitamin D deficiency     Dyslipidemia     Polycystic liver disease     Kidney replaced by transplant     Immunosuppression (H)     Thrombocytopenia (H)     Steroid-induced hyperglycemia     Aftercare following organ transplant     Alopecia       Social History   Social History     Tobacco Use     Smoking status: Never Smoker     Smokeless tobacco: Never Used   Substance Use Topics     Alcohol use: Yes     Alcohol/week: 0.5 oz     Types: 1 Standard drinks or equivalent per week     Drug use: No       Allergies   Allergies   Allergen Reactions     Contrast Dye Unknown     Mold Unknown       Medications   Current Outpatient Medications   Medication Sig     amLODIPine (NORVASC) 5 MG tablet TAKE 1 TABLET BY MOUTH AT  BEDTIME     mycophenolate (GENERIC EQUIVALENT) 250 MG capsule Take 3  capsules (750 mg) by mouth 2 times daily     sulfamethoxazole-trimethoprim (BACTRIM/SEPTRA) 400-80 MG per tablet Take 1 tablet by mouth daily     tacrolimus (GENERIC EQUIVALENT) 1 MG capsule Take 1 capsule (1 mg) by mouth 2 times daily     Vitamin D, Cholecalciferol, 1000 UNITS TABS Take 1,000 Units by mouth daily     tacrolimus (GENERIC EQUIVALENT) 0.5 MG capsule Take 1 capsule (0.5 mg) by mouth 2 times daily (Patient not taking: Reported on 9/9/2019)     No current facility-administered medications for this visit.      Medications Discontinued During This Encounter   Medication Reason     tacrolimus (GENERIC EQUIVALENT) 0.5 MG capsule        Physical Exam   Vital Signs: /84   Pulse 99   Temp 98  F (36.7  C)   Wt 58.9 kg (129 lb 12.8 oz)   SpO2 99%   BMI 25.14 kg/m       GENERAL APPEARANCE: alert and no distress  HENT: mouth without ulcers or lesions  LYMPHATICS: no cervical or supraclavicular nodes  RESP: lungs clear to auscultation - no rales, rhonchi or wheezes  CV: regular rhythm, normal rate, no rub, no murmur  EDEMA: no LE edema bilaterally  ABDOMEN: soft, nondistended, nontender, bowel sounds normal  MS: extremities normal - no gross deformities noted, no evidence of inflammation in joints, no muscle tenderness  SKIN: no rash  TX KIDNEY: normal  DIALYSIS ACCESS: None      Data     Renal Latest Ref Rng & Units 9/6/2019 7/15/2019 3/4/2019   Na 133 - 144 mmol/L 139 142 138   K 3.4 - 5.3 mmol/L 4.2 4.1 3.6   Cl 94 - 109 mmol/L 106 109 105   CO2 20 - 32 mmol/L 29 27 27   BUN 7 - 30 mg/dL 19 13 12   Cr 0.52 - 1.04 mg/dL 0.70 0.74 0.72   Glucose 70 - 99 mg/dL 129(H) 77 106(H)   Ca  8.5 - 10.1 mg/dL 9.3 9.3 8.5   Mg 1.6 - 2.3 mg/dL - - -     Bone Health Latest Ref Rng & Units 9/18/2017 10/17/2016 12/28/2015   Phos 2.5 - 4.5 mg/dL - - 3.5   PTHi 12 - 72 pg/mL - 42 -   Vit D Def 20 - 75 ug/L 34 29 -     Heme Latest Ref Rng & Units 9/6/2019 7/15/2019 3/4/2019   WBC 4.0 - 11.0 10e9/L 2.9(L) 4.0 2.1(L)   Hgb  11.7 - 15.7 g/dL 13.7 13.8 13.7   Plt 150 - 450 10e9/L 92(L) 102(L) 90(L)     Liver Latest Ref Rng & Units 4/25/2016 9/28/2015 1/26/2015   AP 40 - 150 U/L 191(H) 342(H) 209(H)   TBili 0.2 - 1.3 mg/dL 0.5 0.3 0.3   DBili 0.0 - 0.2 mg/dL 0.2 - -   ALT 0 - 50 U/L 36 68(H) 31   AST 0 - 45 U/L 27 45 22   Tot Protein 6.8 - 8.8 g/dL 6.7(L) 6.9 7.0   Albumin 3.4 - 5.0 g/dL 3.5 3.6 3.5     Pancreas Latest Ref Rng & Units 9/30/2015   A1C 4.3 - 6.0 % 5.5     Iron studies Latest Ref Rng & Units 10/8/2015   Iron 35 - 180 ug/dL 69   Iron sat 15 - 46 % 28   Ferritin 8 - 252 ng/mL 865(H)     [facility] Txp Virology Latest Ref Rng & Units 5/31/2018 12/27/2017 9/2/2016   CVM DNA Quant - - - Plasma   BK Spec - Plasma Plasma -   BK Res BKNEG:BK Virus DNA Not Detected copies/mL BK Virus DNA Not Detected BK Virus DNA Not Detected -   BK Log <2.7 Log copies/mL Not Calculated Not Calculated -   Hep B Core NR - - -        Recent Labs   Lab Test 03/04/19  0930 07/15/19  0944 09/06/19  0905   DOSTAC 2130 3/4/19 2130 7/14/19 2100 9/6/19   TACROL 5.4 4.4* 3.9*     Recent Labs   Lab Test 12/21/15  0909 12/28/15  0917 10/17/16  0935   DOSMPA LD 12/20/15 2100 LD 12/27/2015 2100 ld 10/16/16 2100   MPACID 2.66 4.66* 5.02*   MPAG 31.8 37.3 51.7     Scribe Disclosure:  I, Low Gonzalez, am serving as a scribe to document services personally performed by Kidney/Pancreas Recipient at this visit, based upon the provider's statements to me. All documentation has been reviewed by the aforementioned provider prior to being entered into the official medical record.     I, Low Gonzalez, a scribe, prepared the chart for today's encounter.     Maxim Brooke MD

## 2019-09-09 NOTE — NURSING NOTE
"Chief Complaint   Patient presents with     RECHECK     Follow up Kidney TX     Vital signs:  Temp: 98  F (36.7  C)   BP: 130/84 Pulse: 99     SpO2: 99 %       Weight: 58.9 kg (129 lb 12.8 oz)  Estimated body mass index is 25.14 kg/m  as calculated from the following:    Height as of 8/22/17: 1.53 m (5' 0.25\").    Weight as of this encounter: 58.9 kg (129 lb 12.8 oz).        Isha Adkins, CMA    "

## 2019-09-09 NOTE — NURSING NOTE
Maritza Navarro was seen today in clinic by this writer. Medications, lab orders, lab frequency, and necessary follow up discussed with patient. Patient was provided with a copy of the current lab letter. Patient voiced understanding and agreement of education and plan.     Nelda Will RN

## 2019-09-16 DIAGNOSIS — Z94.0 KIDNEY TRANSPLANTED: ICD-10-CM

## 2019-09-16 DIAGNOSIS — Z94.0 KIDNEY TRANSPLANT RECIPIENT: ICD-10-CM

## 2019-09-16 DIAGNOSIS — Z94.0 KIDNEY REPLACED BY TRANSPLANT: Primary | ICD-10-CM

## 2019-09-16 DIAGNOSIS — Z79.60 LONG-TERM USE OF IMMUNOSUPPRESSANT MEDICATION: ICD-10-CM

## 2019-09-16 RX ORDER — TACROLIMUS 1 MG/1
1 CAPSULE ORAL 2 TIMES DAILY
Qty: 60 CAPSULE | Refills: 11 | Status: SHIPPED | OUTPATIENT
Start: 2019-09-16 | End: 2020-09-16

## 2019-09-16 RX ORDER — SULFAMETHOXAZOLE AND TRIMETHOPRIM 400; 80 MG/1; MG/1
1 TABLET ORAL DAILY
Qty: 90 TABLET | Refills: 3 | Status: SHIPPED | OUTPATIENT
Start: 2019-09-16 | End: 2020-07-20

## 2019-09-16 RX ORDER — TACROLIMUS 0.5 MG/1
0.5 CAPSULE ORAL 2 TIMES DAILY
Qty: 60 CAPSULE | Refills: 11 | Status: SHIPPED | OUTPATIENT
Start: 2019-09-16 | End: 2020-09-16

## 2019-09-16 NOTE — TELEPHONE ENCOUNTER
Patient Call: Medication Refill  Route to LPN  Instruct the patient to first contact their pharmacy. If they have called their pharmacy and require further assistance, route to LPN.    Pharmacy Name: [facility]   Pharmacy Location: - [address]  Name of Medication: amLODIPine (NORVASC) 5 MG tablet  sulfamethoxazole-trimethoprim (BACTRIM/SEPTRA) 400-80 MG per tablet  tacrolimus (GENERIC EQUIVALENT) 0.5 MG capsule  tacrolimus (GENERIC EQUIVALENT) 1 MG capsule    When will the patient be out of this medication?: Less than 24 hours ([facility] LPN, then page if no answer)     Patient needs a one month supply of  meds sent to local pharmacy. Her new specialty pharmacy is [facility] and will need scripts for all meds sent there as well. [facility] will not [facility] them to her in time, therefore, patient would like 30 day supply sent to local pharmacy.

## 2019-10-14 DIAGNOSIS — I10 ESSENTIAL HYPERTENSION WITH GOAL BLOOD PRESSURE LESS THAN 130/80: ICD-10-CM

## 2019-10-14 DIAGNOSIS — Z94.0 KIDNEY REPLACED BY TRANSPLANT: Primary | ICD-10-CM

## 2019-10-14 DIAGNOSIS — I15.1 HYPERTENSION SECONDARY TO OTHER RENAL DISORDERS: ICD-10-CM

## 2019-10-14 RX ORDER — AMLODIPINE BESYLATE 5 MG/1
5 TABLET ORAL AT BEDTIME
Qty: 90 TABLET | Refills: 3 | Status: SHIPPED | OUTPATIENT
Start: 2019-10-14 | End: 2020-09-16

## 2019-10-14 RX ORDER — MYCOPHENOLATE MOFETIL 250 MG/1
750 CAPSULE ORAL 2 TIMES DAILY
Qty: 540 CAPSULE | Refills: 3 | Status: SHIPPED | OUTPATIENT
Start: 2019-10-14 | End: 2020-07-20

## 2019-10-14 NOTE — TELEPHONE ENCOUNTER
Patient Call: Medication Refill  Route to LPN  Instruct the patient to first contact their pharmacy. If they have called their pharmacy and require further assistance, route to LPN.    Pharmacy Name: [facility]  Pharmacy Location: [place], [place]  Name of Medication: Amlodipine 5 mg, Mycophenolate 250 mg   When will the patient be out of this medication?: Greater than 3 days (Route standard priority)

## 2019-12-09 ENCOUNTER — HEALTH MAINTENANCE LETTER (OUTPATIENT)
Age: 64
End: 2019-12-09

## 2020-02-19 ENCOUNTER — RESULTS ONLY (OUTPATIENT)
Dept: OTHER | Facility: CLINIC | Age: 65
End: 2020-02-19

## 2020-02-19 DIAGNOSIS — E55.9 VITAMIN D DEFICIENCY: ICD-10-CM

## 2020-02-19 DIAGNOSIS — Z94.0 KIDNEY REPLACED BY TRANSPLANT: ICD-10-CM

## 2020-02-19 DIAGNOSIS — Z79.899 ENCOUNTER FOR LONG-TERM CURRENT USE OF MEDICATION: ICD-10-CM

## 2020-02-19 DIAGNOSIS — Z48.298 AFTERCARE FOLLOWING ORGAN TRANSPLANT: ICD-10-CM

## 2020-02-19 LAB
ANION GAP SERPL CALCULATED.3IONS-SCNC: 6 MMOL/L (ref 3–14)
BUN SERPL-MCNC: 12 MG/DL (ref 7–30)
CALCIUM SERPL-MCNC: 8.9 MG/DL (ref 8.5–10.1)
CHLORIDE SERPL-SCNC: 106 MMOL/L (ref 94–109)
CO2 SERPL-SCNC: 25 MMOL/L (ref 20–32)
CREAT SERPL-MCNC: 0.69 MG/DL (ref 0.52–1.04)
CREAT UR-MCNC: 106 MG/DL
ERYTHROCYTE [DISTWIDTH] IN BLOOD BY AUTOMATED COUNT: 12.6 % (ref 10–15)
GFR SERPL CREATININE-BSD FRML MDRD: >90 ML/MIN/{1.73_M2}
GLUCOSE SERPL-MCNC: 136 MG/DL (ref 70–99)
HCT VFR BLD AUTO: 40.8 % (ref 35–47)
HGB BLD-MCNC: 13.6 G/DL (ref 11.7–15.7)
MCH RBC QN AUTO: 29.1 PG (ref 26.5–33)
MCHC RBC AUTO-ENTMCNC: 33.3 G/DL (ref 31.5–36.5)
MCV RBC AUTO: 87 FL (ref 78–100)
PLATELET # BLD AUTO: 96 10E9/L (ref 150–450)
POTASSIUM SERPL-SCNC: 3.9 MMOL/L (ref 3.4–5.3)
PROT UR-MCNC: 0.16 G/L
PROT/CREAT 24H UR: 0.15 G/G CR (ref 0–0.2)
RBC # BLD AUTO: 4.68 10E12/L (ref 3.8–5.2)
SODIUM SERPL-SCNC: 137 MMOL/L (ref 133–144)
TACROLIMUS BLD-MCNC: 3.6 UG/L (ref 5–15)
TME LAST DOSE: ABNORMAL H
WBC # BLD AUTO: 3.7 10E9/L (ref 4–11)

## 2020-02-19 PROCEDURE — 85027 COMPLETE CBC AUTOMATED: CPT | Performed by: INTERNAL MEDICINE

## 2020-02-19 PROCEDURE — 82306 VITAMIN D 25 HYDROXY: CPT | Performed by: INTERNAL MEDICINE

## 2020-02-19 PROCEDURE — 84156 ASSAY OF PROTEIN URINE: CPT | Performed by: INTERNAL MEDICINE

## 2020-02-19 PROCEDURE — 86832 HLA CLASS I HIGH DEFIN QUAL: CPT | Performed by: INTERNAL MEDICINE

## 2020-02-19 PROCEDURE — 36415 COLL VENOUS BLD VENIPUNCTURE: CPT | Performed by: INTERNAL MEDICINE

## 2020-02-19 PROCEDURE — 80048 BASIC METABOLIC PNL TOTAL CA: CPT | Performed by: INTERNAL MEDICINE

## 2020-02-19 PROCEDURE — 86833 HLA CLASS II HIGH DEFIN QUAL: CPT | Performed by: INTERNAL MEDICINE

## 2020-02-19 PROCEDURE — 80197 ASSAY OF TACROLIMUS: CPT | Performed by: INTERNAL MEDICINE

## 2020-02-20 ENCOUNTER — TELEPHONE (OUTPATIENT)
Dept: TRANSPLANT | Facility: CLINIC | Age: 65
End: 2020-02-20

## 2020-02-20 DIAGNOSIS — Z94.0 KIDNEY TRANSPLANTED: Primary | ICD-10-CM

## 2020-02-20 DIAGNOSIS — Z94.0 IMMUNOSUPPRESSIVE MANAGEMENT ENCOUNTER FOLLOWING KIDNEY TRANSPLANT: ICD-10-CM

## 2020-02-20 DIAGNOSIS — Z79.899 IMMUNOSUPPRESSIVE MANAGEMENT ENCOUNTER FOLLOWING KIDNEY TRANSPLANT: ICD-10-CM

## 2020-02-20 DIAGNOSIS — Z48.298 AFTERCARE FOLLOWING ORGAN TRANSPLANT: ICD-10-CM

## 2020-02-20 LAB — DEPRECATED CALCIDIOL+CALCIFEROL SERPL-MC: 33 UG/L (ref 20–75)

## 2020-02-20 NOTE — TELEPHONE ENCOUNTER
Tacrolimus = 3.6 (2/19/20)  Level much lower after dose was increased in Sept 2019  Goal 4-6  Current tac dose 1.5 mg BID    PLAN:   Call Maritza Navarro and confirm this was a good 12-hour trough. Verify dose 1.5 mg BID.   Confirm no new medications or illness (ifeanyi. Diarrhea).   If good trough, increase dose to 2 mg BID and recheck level in 1-2 weeks  Otherwise, if not a good level, have Tac levels rechecked and make sure it is a good trough.    OUTCOME:  Left VM.  RNCC requested call back to confirm.  Lab order placed.

## 2020-02-24 LAB
DONOR IDENTIFICATION: NORMAL
DSA COMMENTS: NORMAL
DSA PRESENT: NO
DSA TEST METHOD: NORMAL
ORGAN: NORMAL
SA1 CELL: NORMAL
SA1 COMMENTS: NORMAL
SA1 HI RISK ABY: NORMAL
SA1 MOD RISK ABY: NORMAL
SA1 TEST METHOD: NORMAL
SA2 CELL: NORMAL
SA2 COMMENTS: NORMAL
SA2 HI RISK ABY UA: NORMAL
SA2 MOD RISK ABY: NORMAL
SA2 TEST METHOD: NORMAL
UNACCEPTABLE ANTIGEN: NORMAL
UNOS CPRA: 0

## 2020-03-15 ENCOUNTER — HEALTH MAINTENANCE LETTER (OUTPATIENT)
Age: 65
End: 2020-03-15

## 2020-05-14 ENCOUNTER — TELEPHONE (OUTPATIENT)
Dept: TRANSPLANT | Facility: CLINIC | Age: 65
End: 2020-05-14

## 2020-05-14 NOTE — TELEPHONE ENCOUNTER
Called back Keri. Discussed ok to get the shot. If Provider wants to call [facility]plant [facility] they can.  Also discussed being high risk and extra precaution going back to work.  Discussed client coming in sick. States they ask them not to come sick and clients also have to wear a mask.

## 2020-05-14 NOTE — TELEPHONE ENCOUNTER
Patient Call: Question:  Can she get a steroid shot?  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 Yes    Return Call Needed  Same as documented in contacts section  When to return call?: Same day: Route High Priority

## 2020-07-20 ENCOUNTER — TELEPHONE (OUTPATIENT)
Dept: TRANSPLANT | Facility: CLINIC | Age: 65
End: 2020-07-20

## 2020-07-20 DIAGNOSIS — Z94.0 KIDNEY TRANSPLANTED: ICD-10-CM

## 2020-07-20 DIAGNOSIS — Z79.60 LONG-TERM USE OF IMMUNOSUPPRESSANT MEDICATION: ICD-10-CM

## 2020-07-20 DIAGNOSIS — Z94.0 KIDNEY REPLACED BY TRANSPLANT: Primary | ICD-10-CM

## 2020-07-20 DIAGNOSIS — Z94.0 KIDNEY TRANSPLANT RECIPIENT: ICD-10-CM

## 2020-07-20 RX ORDER — SULFAMETHOXAZOLE AND TRIMETHOPRIM 400; 80 MG/1; MG/1
1 TABLET ORAL DAILY
Qty: 90 TABLET | Refills: 1 | Status: SHIPPED | OUTPATIENT
Start: 2020-07-20 | End: 2020-09-16

## 2020-07-20 RX ORDER — MYCOPHENOLATE MOFETIL 250 MG/1
750 CAPSULE ORAL 2 TIMES DAILY
Qty: 540 CAPSULE | Refills: 1 | Status: SHIPPED | OUTPATIENT
Start: 2020-07-20 | End: 2020-09-16

## 2020-07-20 NOTE — TELEPHONE ENCOUNTER
Patient Call: Voicemail  Date/Time: 7/20/2020 @ 10:38am  Reason for call: patient left voicemail she is having trouble getting her prescriptions refilled and would like to have them transferred to [facility]. Please call patient back to discuss further.

## 2020-07-20 NOTE — TELEPHONE ENCOUNTER
Patient left a voicemail returning your call.  She needs the following medications called in to [facility] in [place]: Amlodipne 5mg, Bactrim and Mycophenolate.  Please call her with questions.

## 2020-09-14 ENCOUNTER — VIRTUAL VISIT (OUTPATIENT)
Dept: NEPHROLOGY | Facility: CLINIC | Age: 65
End: 2020-09-14
Attending: INTERNAL MEDICINE
Payer: COMMERCIAL

## 2020-09-14 ENCOUNTER — TELEPHONE (OUTPATIENT)
Dept: TRANSPLANT | Facility: CLINIC | Age: 65
End: 2020-09-14

## 2020-09-14 ENCOUNTER — RECORDS - HEALTHEAST (OUTPATIENT)
Dept: ADMINISTRATIVE | Facility: OTHER | Age: 65
End: 2020-09-14

## 2020-09-14 ENCOUNTER — RESULTS ONLY (OUTPATIENT)
Dept: OTHER | Facility: CLINIC | Age: 65
End: 2020-09-14

## 2020-09-14 DIAGNOSIS — Z29.89 NEED FOR PNEUMOCYSTIS PROPHYLAXIS: ICD-10-CM

## 2020-09-14 DIAGNOSIS — D69.6 THROMBOCYTOPENIA (H): ICD-10-CM

## 2020-09-14 DIAGNOSIS — Z79.899 ENCOUNTER FOR LONG-TERM CURRENT USE OF MEDICATION: ICD-10-CM

## 2020-09-14 DIAGNOSIS — Z48.298 AFTERCARE FOLLOWING ORGAN TRANSPLANT: ICD-10-CM

## 2020-09-14 DIAGNOSIS — E55.9 VITAMIN D DEFICIENCY: ICD-10-CM

## 2020-09-14 DIAGNOSIS — Z94.0 KIDNEY REPLACED BY TRANSPLANT: ICD-10-CM

## 2020-09-14 DIAGNOSIS — I15.1 HTN, KIDNEY TRANSPLANT RELATED: ICD-10-CM

## 2020-09-14 DIAGNOSIS — Q61.3 POLYCYSTIC KIDNEY DISEASE: ICD-10-CM

## 2020-09-14 DIAGNOSIS — Z94.0 KIDNEY REPLACED BY TRANSPLANT: Primary | ICD-10-CM

## 2020-09-14 DIAGNOSIS — D84.9 IMMUNOSUPPRESSION (H): ICD-10-CM

## 2020-09-14 DIAGNOSIS — Z94.0 HTN, KIDNEY TRANSPLANT RELATED: ICD-10-CM

## 2020-09-14 DIAGNOSIS — L65.9 ALOPECIA: ICD-10-CM

## 2020-09-14 LAB
ANION GAP SERPL CALCULATED.3IONS-SCNC: 4 MMOL/L (ref 3–14)
BUN SERPL-MCNC: 16 MG/DL (ref 7–30)
CALCIUM SERPL-MCNC: 8.8 MG/DL (ref 8.5–10.1)
CHLORIDE SERPL-SCNC: 107 MMOL/L (ref 94–109)
CO2 SERPL-SCNC: 27 MMOL/L (ref 20–32)
CREAT SERPL-MCNC: 0.71 MG/DL (ref 0.52–1.04)
CREAT UR-MCNC: 62 MG/DL
ERYTHROCYTE [DISTWIDTH] IN BLOOD BY AUTOMATED COUNT: 12.8 % (ref 10–15)
GFR SERPL CREATININE-BSD FRML MDRD: 89 ML/MIN/{1.73_M2}
GLUCOSE SERPL-MCNC: 108 MG/DL (ref 70–99)
HCT VFR BLD AUTO: 40.5 % (ref 35–47)
HGB BLD-MCNC: 13.5 G/DL (ref 11.7–15.7)
MCH RBC QN AUTO: 29.1 PG (ref 26.5–33)
MCHC RBC AUTO-ENTMCNC: 33.3 G/DL (ref 31.5–36.5)
MCV RBC AUTO: 87 FL (ref 78–100)
PLATELET # BLD AUTO: 91 10E9/L (ref 150–450)
POTASSIUM SERPL-SCNC: 4 MMOL/L (ref 3.4–5.3)
PROT UR-MCNC: 0.16 G/L
PROT/CREAT 24H UR: 0.27 G/G CR (ref 0–0.2)
RBC # BLD AUTO: 4.64 10E12/L (ref 3.8–5.2)
SODIUM SERPL-SCNC: 138 MMOL/L (ref 133–144)
TACROLIMUS BLD-MCNC: 4.8 UG/L (ref 5–15)
TME LAST DOSE: ABNORMAL H
WBC # BLD AUTO: 3.8 10E9/L (ref 4–11)

## 2020-09-14 PROCEDURE — 40001009 ZZH VIDEO/TELEPHONE VISIT; NO CHARGE

## 2020-09-14 PROCEDURE — 85027 COMPLETE CBC AUTOMATED: CPT | Performed by: INTERNAL MEDICINE

## 2020-09-14 PROCEDURE — 86833 HLA CLASS II HIGH DEFIN QUAL: CPT | Performed by: INTERNAL MEDICINE

## 2020-09-14 PROCEDURE — 84156 ASSAY OF PROTEIN URINE: CPT | Performed by: INTERNAL MEDICINE

## 2020-09-14 PROCEDURE — 86832 HLA CLASS I HIGH DEFIN QUAL: CPT | Performed by: INTERNAL MEDICINE

## 2020-09-14 PROCEDURE — 80197 ASSAY OF TACROLIMUS: CPT | Performed by: INTERNAL MEDICINE

## 2020-09-14 PROCEDURE — 36415 COLL VENOUS BLD VENIPUNCTURE: CPT | Performed by: INTERNAL MEDICINE

## 2020-09-14 PROCEDURE — 80048 BASIC METABOLIC PNL TOTAL CA: CPT | Performed by: INTERNAL MEDICINE

## 2020-09-14 ASSESSMENT — PAIN SCALES - GENERAL: PAINLEVEL: NO PAIN (0)

## 2020-09-14 NOTE — RESULT ENCOUNTER NOTE
CBC within baseline  Platelet 91 remains stable Isotretinoin Counseling: Patient should get monthly blood tests, not donate blood, not drive at night if vision affected, not share medication, and not undergo elective surgery for 6 months after tx completed. Side effects reviewed, pt to contact office should one occur.

## 2020-09-14 NOTE — TELEPHONE ENCOUNTER
September 14, 2020 1:54 PM -  AIVERSE1: PT CALLED TO [facility] NEXT NEPH APPT 9/21

## 2020-09-14 NOTE — LETTER
"9/14/2020       RE: Maritza Navarro  [address] [facility] [address]     Dear Colleague,    Thank you for referring your patient, Maritza Navarro, to the [facility] NEPHROLOGY at [facility]. Please see a copy of my visit note below.    Maritza Navarro is a 64 year old female who is being evaluated via a billable telephone visit.      The patient has been notified of following:     \"This telephone visit will be conducted via a call between you and your physician/provider. We have found that certain health care needs can be provided without the need for a physical exam.  This service lets us provide the care you need with a short phone conversation.  If a prescription is necessary we can send it directly to your pharmacy.  If lab work is needed we can place an order for that and you can then stop by our lab to have the test done at a later time.    Telephone visits are billed at different rates depending on your insurance coverage. During this emergency period, for some insurers they may be billed the same as an in-person visit.  Please reach out to your insurance provider with any questions.    If during the course of the call the physician/provider feels a telephone visit is not appropriate, you will not be charged for this service.\"    Patient has given verbal consent for Telephone visit?  Yes    What phone number would you like to be contacted at? [phone]    How would you like to obtain your AVS? Mail a copy    Phone call duration: 25 minutes    David Valero MD              CHRONIC TRANSPLANT NEPHROLOGY VISIT    Assessment & Plan   # LDKT: Stable   - Baseline Cr ~ 0.7-0.9   - Proteinuria: Normal (<0.2 grams)   - Date DSA Last Checked: May/2018       Latest DSA: No   - BK Viremia: No   - Kidney Tx Biopsy: No    Creatinine last checked in Feb 2020 (0.7mg/dL). Today she has lab-work that is pending. We encouraged her to get quarterly labs.     # Immunosuppression: " Tacrolimus immediate release (goal 4-6) and Mycophenolate mofetil (goal not followed)   - Changes: Not at this time, will wait for tacrolimus level today before adjusting dose.    # Prophylaxis:   - PJP: Sulfa/TMP (Bactrim)    # Hypertension: Controlled; Goal BP: < 130/80   - Changes: No    # Mineral Bone Disorder:   - Vitamin D; level: Normal   - Calcium; level: Normal    # Thrombocytopenia: Stable platelet count running ~ 's.    # Alopecia: Mostly stable. Felt likely at least partly due to tacrolimus, but patient isn't interested in changing immunosuppression.    # Skin Cancer Risk:    - Discussed sun protection and recommend regular follow up with Dermatology.    # Medical Compliance: No.  Evidence of labs less than recommended.  - Discussed importance of checking labs regularly as recommended, taking medications as prescribed and attending scheduled medical appointments.    # Shingles: Will talk to her PCP about getting the vaccination.     # Transplant History:  Etiology of kidney failure: Polycystic kidney disease (PKD)  Tx: LDKT  Transplant: 9/29/2015 (Kidney)  Donor Type: Living Donor Class:   Significant changes in immunosuppression: None  Significant transplant-related complications: None    Transplant Office Phone Number: [phone]    Assessment and plan was discussed with the patient and she voiced her understanding and agreement.    Return visit: Return in about 1 year (around 9/14/2021).    Chief Complaint   Ms. Navarro is a 64 year old here for routine follow up.    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 She was last seen in the clinic 9/9/2019 by Dr Brooke. Please see his note for more details.     She reports no new complaints, and denies and denies fevers, chills, or night sweats. She denies any contacts with people with fevers or chills. She has gone back to work and works as a . She has no chest  pain and no shortness of breath. She denies dysuria.     She has no change in her energy levels, and has no fatigue or lymphadenopathy. She has no dysphagia and has been compliant with her medications.     Recent Hospitalizations:  [x] No [] Yes    New Medical Issues: [x] No [] Yes    Decreased energy: [x] No [] Yes    Chest pain or SOB with exertion:  [x] No [] Yes    Appetite change or weight change: [x] No [] Yes    Nausea, vomiting or diarrhea:  [x] No [] Yes    Fever, sweats or chills: [x] No [] Yes    Leg swelling: [x] No [] Yes      Home BP: 120's/70's     Review of Systems   A comprehensive review of systems was obtained and negative, except as noted in the HPI or PMH.    Problem List   Patient Active Problem List   Diagnosis     Polycystic kidney disease     HTN, kidney transplant related     Vitamin D deficiency     Dyslipidemia     Polycystic liver disease     Kidney replaced by transplant     Immunosuppression (H)     Thrombocytopenia (H)     Steroid-induced hyperglycemia     Aftercare following organ transplant     Alopecia       Social History   Social History     Tobacco Use     Smoking status: Never Smoker     Smokeless tobacco: Never Used   Substance Use Topics     Alcohol use: Yes     Alcohol/week: 0.8 standard drinks     Types: 1 Standard drinks or equivalent per week     Drug use: No       Allergies   Allergies   Allergen Reactions     Contrast Dye Unknown     Mold Unknown       Medications   Current Outpatient Medications   Medication Sig     amLODIPine (NORVASC) 5 MG tablet Take 1 tablet (5 mg) by mouth At Bedtime     mycophenolate (GENERIC EQUIVALENT) 250 MG capsule Take 3 capsules (750 mg) by mouth 2 times daily     sulfamethoxazole-trimethoprim (BACTRIM) 400-80 MG tablet Take 1 tablet by mouth daily     tacrolimus (GENERIC EQUIVALENT) 0.5 MG capsule Take 1 capsule (0.5 mg) by mouth 2 times daily     tacrolimus (GENERIC EQUIVALENT) 1 MG capsule Take 1 capsule (1 mg) by mouth 2 times daily      Vitamin D, Cholecalciferol, 1000 UNITS TABS Take 1,000 Units by mouth daily     No current facility-administered medications for this visit.      There are no discontinued medications.    Physical Exam   Vital Signs: There were no vitals taken for this visit.    No vitals were taken for this telemedicine visit    Data     Renal Latest Ref Rng & Units 2/19/2020 9/6/2019 7/15/2019   Na 133 - 144 mmol/L 137 139 142   K 3.4 - 5.3 mmol/L 3.9 4.2 4.1   Cl 94 - 109 mmol/L 106 106 109   CO2 20 - 32 mmol/L 25 29 27   BUN 7 - 30 mg/dL 12 19 13   Cr 0.52 - 1.04 mg/dL 0.69 0.70 0.74   Glucose 70 - 99 mg/dL 136(H) 129(H) 77   Ca  8.5 - 10.1 mg/dL 8.9 9.3 9.3   Mg 1.6 - 2.3 mg/dL - - -     Bone Health Latest Ref Rng & Units 2/19/2020 9/18/2017 10/17/2016   Phos 2.5 - 4.5 mg/dL - - -   PTHi 12 - 72 pg/mL - - 42   Vit D Def 20 - 75 ug/L 33 34 29     Heme Latest Ref Rng & Units 9/14/2020 2/19/2020 9/6/2019   WBC 4.0 - 11.0 10e9/L 3.8(L) 3.7(L) 2.9(L)   Hgb 11.7 - 15.7 g/dL 13.5 13.6 13.7   Plt 150 - 450 10e9/L 91(L) 96(L) 92(L)   ABSOLUTE NEUTROPHIL 1.6 - 8.3 10e9/L - - -   ABSOLUTE LYMPHOCYTES 0.8 - 5.3 10e9/L - - -   ABSOLUTE MONOCYTES 0.0 - 1.3 10e9/L - - -   ABSOLUTE EOSINOPHILS 0.0 - 0.7 10e9/L - - -   ABSOLUTE BASOPHILS 0.0 - 0.2 10e9/L - - -   ABS IMMATURE GRANULOCYTES 0 - 0.4 10e9/L - - -     Liver Latest Ref Rng & Units 4/25/2016 9/28/2015 1/26/2015   AP 40 - 150 U/L 191(H) 342(H) 209(H)   TBili 0.2 - 1.3 mg/dL 0.5 0.3 0.3   DBili 0.0 - 0.2 mg/dL 0.2 - -   ALT 0 - 50 U/L 36 68(H) 31   AST 0 - 45 U/L 27 45 22   Tot Protein 6.8 - 8.8 g/dL 6.7(L) 6.9 7.0   Albumin 3.4 - 5.0 g/dL 3.5 3.6 3.5     Pancreas Latest Ref Rng & Units 9/30/2015   A1C 4.3 - 6.0 % 5.5     Iron studies Latest Ref Rng & Units 10/8/2015   Iron 35 - 180 ug/dL 69   Iron sat 15 - 46 % 28   Ferritin 8 - 252 ng/mL 865(H)     [facility] Txp Virology Latest Ref Rng & Units 5/31/2018 12/27/2017 9/2/2016   CVM DNA Quant - - - Plasma   CMV QUANT IU/ML CMVND [IU]/mL - - CMV  DNA Not Detected   The M[employer] AmpliPrep/MADDISON TaqMan CMV Test is an FDA-approved in vitro nucleic   acid amplification test for the quantitation of cytomegalovirus DNA in human   plasma (EDTA plasma) using the [employer] AmpliPrep Instrument for automated viral   nucleic acid extraction and the [employer] TaqMan Analyzer or MADDISON TaqMan for   automated Real Time amplification and detection of the viral nucleic acid   target.   Titer results are reported in International Units/mL (IU/mL using 1st WHO   International standard for Human Cytomegalovirus for Nucleic Acid Amplification   based assays. The conversion factor between CMV DNA copis/mL (as defined by the   Roche MADDISON TaqMan CMV test) and International Units is the CMV DNA   concentration in IU/mL x 1.1 copies/IU = CMV DNA in copies/mL.   This assay has received FDA approval for the testing of human plasma only. The   Infectious Disease Diagnostic Laboratory at the [facility], [place], has validated the pe  rformance characteristics of the Roche   CMV assay for plasma, bronchial alveolar lavage/wash and urine.     LOG IU/ML OF CMVQNT <2.1 [Log:IU]/mL - - Not Calculated   BK Spec - Plasma Plasma -   BK Res BKNEG:BK Virus DNA Not Detected copies/mL BK Virus DNA Not Detected BK Virus DNA Not Detected -   BK Log <2.7 Log copies/mL Not Calculated Not Calculated -   EBV CAPSID ANTIBODY IGG 0.0 - 0.8 AI - - -   EBV DNA COPIES/ML EBVNEG [Copies]/mL - - EBV DNA Not Detected   EBV DNA LOG OF COPIES <2.7 [Log:copies]/mL - - Not Calculated   The Real-Time quantitative EBV assay was developed and its performance   characteristics determined by the Infectious Diseases Diagnostic Laboratory at   the [facility] in [place], [place].  The   primers and probes are Analyte Specific Reagents (ASRs) manufactured  by   [employer].   ASRs are used in many laboratory tests necessary for standard medical care and   generally do not  require U.S. Food and Drug Administration approval.  The FDA   has determined that such clearance or approval is not necessary.  This test is   used for clinical purposes.  It should not be regarded as investigational or   research.   This laboratory is certified under the Clinical Laboratory Improvement   Amendments of 1988 (CLIA-88) as qualified to perform high complexity clinical   laboratory testing.   The quantitative range of this assay is 500-22,500,00 copies/mL (2.7-7.4 log   copies/mL).  A negative result does not rule out the presence of PCR inhibitors     in the patient specimen or EBV DNA nucleic acid in concentrations below the   level of detection of the assay.  Inhibition may also lead to underestimation   of viral quantitation.     Hep B Core NR - - -        Recent Labs   Lab Test 07/15/19  0944 09/06/19  0905 02/19/20  0952   DOSTAC 2130 7/14/19 2100 9/6/19 2/18/20 2200   TACROL 4.4* 3.9* 3.6*     Recent Labs   Lab Test 12/21/15  0909 12/28/15  0917 10/17/16  0935   DOSMPA LD 12/20/15 2100 LD 12/27/2015 2100 ld 10/16/16 2100   MPACID 2.66 4.66* 5.02*   MPAG 31.8 37.3 51.7       Physician Attestation   I, Raghav Sanchez MD, saw this patient and agree with the findings and plan of care as documented in the note.      Items personally reviewed/procedural attestation: vitals, labs and imaging and agree with the interpretation documented in the note.    65 yo F with ESKD 2/2 PKD s/p LDKTx 2015 with excellent allograft function, FK trough within goals. Reminded to keep lab/clinic visits. Declines flu vaccine.    Raghav Sanchez MD      Again, thank you for allowing me to participate in the care of your patient.      Sincerely,    Kidney/Pancreas Recipient

## 2020-09-14 NOTE — PROGRESS NOTES
CHRONIC TRANSPLANT NEPHROLOGY VISIT    Assessment & Plan   # LDKT: Stable   - Baseline Cr ~ 0.7-0.9   - Proteinuria: Normal (<0.2 grams)   - Date DSA Last Checked: May/2018       Latest DSA: No   - BK Viremia: No   - Kidney Tx Biopsy: No    Creatinine last checked in Feb 2020 (0.7mg/dL). Today she has lab-work that is pending. We encouraged her to get quarterly labs.     # Immunosuppression: Tacrolimus immediate release (goal 4-6) and Mycophenolate mofetil (goal not followed)   - Changes: Not at this time, will wait for tacrolimus level today before adjusting dose.    # Prophylaxis:   - PJP: Sulfa/TMP (Bactrim)    # Hypertension: Controlled; Goal BP: < 130/80   - Changes: No    # Mineral Bone Disorder:   - Vitamin D; level: Normal   - Calcium; level: Normal    # Thrombocytopenia: Stable platelet count running ~ 's.    # Alopecia: Mostly stable. Felt likely at least partly due to tacrolimus, but patient isn't interested in changing immunosuppression.    # Skin Cancer Risk:    - Discussed sun protection and recommend regular follow up with Dermatology.    # Medical Compliance: No.  Evidence of labs less than recommended.  - Discussed importance of checking labs regularly as recommended, taking medications as prescribed and attending scheduled medical appointments.    # Shingles: Will talk to her PCP about getting the vaccination.     # Transplant History:  Etiology of kidney failure: Polycystic kidney disease (PKD)  Tx: LDKT  Transplant: 9/29/2015 (Kidney)  Donor Type: Living Donor Class:   Significant changes in immunosuppression: None  Significant transplant-related complications: None    Transplant Office Phone Number: [phone]    Assessment and plan was discussed with the patient and she voiced her understanding and agreement.    Return visit: Return in about 1 year (around 9/14/2021).    Chief Complaint   Ms. Navarro is a 64 year old here for routine follow up.    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 She was last seen in the clinic 9/9/2019 by Dr Brooke. Please see his note for more details.     She reports no new complaints, and denies and denies fevers, chills, or night sweats. She denies any contacts with people with fevers or chills. She has gone back to work and works as a . She has no chest pain and no shortness of breath. She denies dysuria.     She has no change in her energy levels, and has no fatigue or lymphadenopathy. She has no dysphagia and has been compliant with her medications.     Recent Hospitalizations:  [x] No [] Yes    New Medical Issues: [x] No [] Yes    Decreased energy: [x] No [] Yes    Chest pain or SOB with exertion:  [x] No [] Yes    Appetite change or weight change: [x] No [] Yes    Nausea, vomiting or diarrhea:  [x] No [] Yes    Fever, sweats or chills: [x] No [] Yes    Leg swelling: [x] No [] Yes      Home BP: 120's/70's     Review of Systems   A comprehensive review of systems was obtained and negative, except as noted in the HPI or PMH.    Problem List   Patient Active Problem List   Diagnosis     Polycystic kidney disease     HTN, kidney transplant related     Vitamin D deficiency     Dyslipidemia     Polycystic liver disease     Kidney replaced by transplant     Immunosuppression (H)     Thrombocytopenia (H)     Steroid-induced hyperglycemia     Aftercare following organ transplant     Alopecia       Social History   Social History     Tobacco Use     Smoking status: Never Smoker     Smokeless tobacco: Never Used   Substance Use Topics     Alcohol use: Yes     Alcohol/week: 0.8 standard drinks     Types: 1 Standard drinks or equivalent per week     Drug use: No       Allergies   Allergies   Allergen Reactions     Contrast Dye Unknown     Mold Unknown       Medications   Current Outpatient Medications   Medication Sig     amLODIPine (NORVASC) 5 MG tablet Take 1 tablet (5 mg) by mouth  At Bedtime     mycophenolate (GENERIC EQUIVALENT) 250 MG capsule Take 3 capsules (750 mg) by mouth 2 times daily     sulfamethoxazole-trimethoprim (BACTRIM) 400-80 MG tablet Take 1 tablet by mouth daily     tacrolimus (GENERIC EQUIVALENT) 0.5 MG capsule Take 1 capsule (0.5 mg) by mouth 2 times daily     tacrolimus (GENERIC EQUIVALENT) 1 MG capsule Take 1 capsule (1 mg) by mouth 2 times daily     Vitamin D, Cholecalciferol, 1000 UNITS TABS Take 1,000 Units by mouth daily     No current facility-administered medications for this visit.      There are no discontinued medications.    Physical Exam   Vital Signs: There were no vitals taken for this visit.    No vitals were taken for this telemedicine visit    Data     Renal Latest Ref Rng & Units 2/19/2020 9/6/2019 7/15/2019   Na 133 - 144 mmol/L 137 139 142   K 3.4 - 5.3 mmol/L 3.9 4.2 4.1   Cl 94 - 109 mmol/L 106 106 109   CO2 20 - 32 mmol/L 25 29 27   BUN 7 - 30 mg/dL 12 19 13   Cr 0.52 - 1.04 mg/dL 0.69 0.70 0.74   Glucose 70 - 99 mg/dL 136(H) 129(H) 77   Ca  8.5 - 10.1 mg/dL 8.9 9.3 9.3   Mg 1.6 - 2.3 mg/dL - - -     Bone Health Latest Ref Rng & Units 2/19/2020 9/18/2017 10/17/2016   Phos 2.5 - 4.5 mg/dL - - -   PTHi 12 - 72 pg/mL - - 42   Vit D Def 20 - 75 ug/L 33 34 29     Heme Latest Ref Rng & Units 9/14/2020 2/19/2020 9/6/2019   WBC 4.0 - 11.0 10e9/L 3.8(L) 3.7(L) 2.9(L)   Hgb 11.7 - 15.7 g/dL 13.5 13.6 13.7   Plt 150 - 450 10e9/L 91(L) 96(L) 92(L)   ABSOLUTE NEUTROPHIL 1.6 - 8.3 10e9/L - - -   ABSOLUTE LYMPHOCYTES 0.8 - 5.3 10e9/L - - -   ABSOLUTE MONOCYTES 0.0 - 1.3 10e9/L - - -   ABSOLUTE EOSINOPHILS 0.0 - 0.7 10e9/L - - -   ABSOLUTE BASOPHILS 0.0 - 0.2 10e9/L - - -   ABS IMMATURE GRANULOCYTES 0 - 0.4 10e9/L - - -     Liver Latest Ref Rng & Units 4/25/2016 9/28/2015 1/26/2015   AP 40 - 150 U/L 191(H) 342(H) 209(H)   TBili 0.2 - 1.3 mg/dL 0.5 0.3 0.3   DBili 0.0 - 0.2 mg/dL 0.2 - -   ALT 0 - 50 U/L 36 68(H) 31   AST 0 - 45 U/L 27 45 22   Tot Protein 6.8 - 8.8  g/dL 6.7(L) 6.9 7.0   Albumin 3.4 - 5.0 g/dL 3.5 3.6 3.5     Pancreas Latest Ref Rng & Units 9/30/2015   A1C 4.3 - 6.0 % 5.5     Iron studies Latest Ref Rng & Units 10/8/2015   Iron 35 - 180 ug/dL 69   Iron sat 15 - 46 % 28   Ferritin 8 - 252 ng/mL 865(H)     [facility] Txp Virology Latest Ref Rng & Units 5/31/2018 12/27/2017 9/2/2016   CVM DNA Quant - - - Plasma   CMV QUANT IU/ML CMVND [IU]/mL - - CMV DNA Not Detected   The MADDISON AmpliPrep/Toppr TaqMan CMV Test is an FDA-approved in vitro nucleic   acid amplification test for the quantitation of cytomegalovirus DNA in human   plasma (EDTA plasma) using the Toppr AmpliPrep Instrument for automated viral   nucleic acid extraction and the Toppr TaqMan Analyzer or GIROPTIC for   automated Real Time amplification and detection of the viral nucleic acid   target.   Titer results are reported in International Units/mL (IU/mL using 1st WHO   International standard for Human Cytomegalovirus for Nucleic Acid Amplification   based assays. The conversion factor between CMV DNA copis/mL (as defined by the   Roche MADDISON TaqMan CMV test) and International Units is the CMV DNA   concentration in IU/mL x 1.1 copies/IU = CMV DNA in copies/mL.   This assay has received FDA approval for the testing of human plasma only. The   [facility] Laboratory at the [facility], [place], has validated the pe  rformance characteristics of the Roche   CMV assay for plasma, bronchial alveolar lavage/wash and urine.     LOG IU/ML OF CMVQNT <2.1 [Log:IU]/mL - - Not Calculated   BK Spec - Plasma Plasma -   BK Res BKNEG:BK Virus DNA Not Detected copies/mL BK Virus DNA Not Detected BK Virus DNA Not Detected -   BK Log <2.7 Log copies/mL Not Calculated Not Calculated -   EBV CAPSID ANTIBODY IGG 0.0 - 0.8 AI - - -   EBV DNA COPIES/ML EBVNEG [Copies]/mL - - EBV DNA Not Detected   EBV DNA LOG OF COPIES <2.7 [Log:copies]/mL - - Not Calculated   The Real-Time  quantitative EBV assay was developed and its performance   characteristics determined by the Infect[facility] Diseases Diagnostic Laboratory at   the [facility] in [place], [place].  The   primers and probes are Analyte Specific Reagents (ASRs) manufactured  by   [employer].   ASRs are used in many laboratory tests necessary for standard medical care and   generally do not require U.S. Food and Drug Administration approval.  The FDA   has determined that such clearance or approval is not necessary.  This test is   used for clinical purposes.  It should not be regarded as investigational or   research.   This laboratory is certified under the Clinical Laboratory Improvement   Amendments of 1988 (CLIA-88) as qualified to perform high complexity clinical   laboratory testing.   The quantitative range of this assay is 500-22,500,00 copies/mL (2.7-7.4 log   copies/mL).  A negative result does not rule out the presence of PCR inhibitors     in the patient specimen or EBV DNA nucleic acid in concentrations below the   level of detection of the assay.  Inhibition may also lead to underestimation   of viral quantitation.     Hep B Core NR - - -        Recent Labs   Lab Test 07/15/19  0944 09/06/19  0905 02/19/20  0952   DOSTAC 2130 7/14/19 2100 9/6/19 2/18/20 2200   TACROL 4.4* 3.9* 3.6*     Recent Labs   Lab Test 12/21/15  0909 12/28/15  0917 10/17/16  0935   DOSMPA LD 12/20/15 2100 LD 12/27/2015 2100 ld 10/16/16 2100   MPACID 2.66 4.66* 5.02*   MPAG 31.8 37.3 51.7       Physician Attestation   I, Raghav Sanchez MD, saw this patient and agree with the findings and plan of care as documented in the note.      Items personally reviewed/procedural attestation: vitals, labs and imaging and agree with the interpretation documented in the note.    65 yo F with ESKD 2/2 PKD s/p LDKTx 2015 with excellent allograft function, FK trough within goals. Reminded to keep lab/clinic visits. Declines flu  vaccine.    Raghav Sanchez MD

## 2020-09-14 NOTE — PROGRESS NOTES
"Maritza Navarro is a 64 year old female who is being evaluated via a billable telephone visit.      The patient has been notified of following:     \"This telephone visit will be conducted via a call between you and your physician/provider. We have found that certain health care needs can be provided without the need for a physical exam.  This service lets us provide the care you need with a short phone conversation.  If a prescription is necessary we can send it directly to your pharmacy.  If lab work is needed we can place an order for that and you can then stop by our lab to have the test done at a later time.    Telephone visits are billed at different rates depending on your insurance coverage. During this emergency period, for some insurers they may be billed the same as an in-person visit.  Please reach out to your insurance provider with any questions.    If during the course of the call the physician/provider feels a telephone visit is not appropriate, you will not be charged for this service.\"    Patient has given verbal consent for Telephone visit?  Yes    What phone number would you like to be contacted at? [phone]    How would you like to obtain your AVS? Mail a copy    Phone call duration: 25 minutes    David Valero MD            "

## 2020-09-16 ENCOUNTER — MYC REFILL (OUTPATIENT)
Dept: NEPHROLOGY | Facility: CLINIC | Age: 65
End: 2020-09-16

## 2020-09-16 ENCOUNTER — MYC REFILL (OUTPATIENT)
Dept: TRANSPLANT | Facility: CLINIC | Age: 65
End: 2020-09-16

## 2020-09-16 DIAGNOSIS — Z94.0 KIDNEY REPLACED BY TRANSPLANT: Primary | ICD-10-CM

## 2020-09-16 DIAGNOSIS — Z79.60 LONG-TERM USE OF IMMUNOSUPPRESSANT MEDICATION: ICD-10-CM

## 2020-09-16 DIAGNOSIS — I15.1 HYPERTENSION SECONDARY TO OTHER RENAL DISORDERS: ICD-10-CM

## 2020-09-16 DIAGNOSIS — Z94.0 KIDNEY TRANSPLANTED: ICD-10-CM

## 2020-09-16 DIAGNOSIS — Z94.0 KIDNEY TRANSPLANT RECIPIENT: ICD-10-CM

## 2020-09-16 DIAGNOSIS — N25.81 SECONDARY RENAL HYPERPARATHYROIDISM (H): ICD-10-CM

## 2020-09-16 DIAGNOSIS — I10 ESSENTIAL HYPERTENSION WITH GOAL BLOOD PRESSURE LESS THAN 130/80: ICD-10-CM

## 2020-09-16 DIAGNOSIS — Z94.0 KIDNEY REPLACED BY TRANSPLANT: ICD-10-CM

## 2020-09-16 RX ORDER — TACROLIMUS 0.5 MG/1
0.5 CAPSULE ORAL 2 TIMES DAILY
Qty: 60 CAPSULE | Refills: 11 | Status: SHIPPED | OUTPATIENT
Start: 2020-09-16 | End: 2021-09-13

## 2020-09-16 RX ORDER — SULFAMETHOXAZOLE AND TRIMETHOPRIM 400; 80 MG/1; MG/1
1 TABLET ORAL DAILY
Qty: 90 TABLET | Refills: 3 | Status: SHIPPED | OUTPATIENT
Start: 2020-09-16 | End: 2023-07-13

## 2020-09-16 RX ORDER — AMLODIPINE BESYLATE 5 MG/1
5 TABLET ORAL AT BEDTIME
Qty: 90 TABLET | Refills: 3 | Status: SHIPPED | OUTPATIENT
Start: 2020-09-16 | End: 2021-08-13

## 2020-09-16 RX ORDER — MYCOPHENOLATE MOFETIL 250 MG/1
750 CAPSULE ORAL 2 TIMES DAILY
Qty: 540 CAPSULE | Refills: 3 | Status: SHIPPED | OUTPATIENT
Start: 2020-09-16 | End: 2021-11-03

## 2020-09-16 RX ORDER — TACROLIMUS 1 MG/1
1 CAPSULE ORAL 2 TIMES DAILY
Qty: 60 CAPSULE | Refills: 11 | Status: SHIPPED | OUTPATIENT
Start: 2020-09-16 | End: 2021-09-29

## 2020-09-16 RX ORDER — MULTIVIT-MIN/IRON/FOLIC ACID/K 18-600-40
1000 CAPSULE ORAL DAILY
Qty: 90 TABLET | Refills: 3 | Status: SHIPPED | OUTPATIENT
Start: 2020-09-16

## 2021-01-14 ENCOUNTER — HEALTH MAINTENANCE LETTER (OUTPATIENT)
Age: 66
End: 2021-01-14

## 2021-02-05 ENCOUNTER — TELEPHONE (OUTPATIENT)
Dept: TRANSPLANT | Facility: CLINIC | Age: 66
End: 2021-02-05

## 2021-02-05 DIAGNOSIS — Z94.0 KIDNEY TRANSPLANTED: ICD-10-CM

## 2021-02-05 DIAGNOSIS — Z94.0 KIDNEY REPLACED BY TRANSPLANT: ICD-10-CM

## 2021-02-05 DIAGNOSIS — Z79.899 ENCOUNTER FOR LONG-TERM CURRENT USE OF MEDICATION: ICD-10-CM

## 2021-02-05 DIAGNOSIS — Z48.298 AFTERCARE FOLLOWING ORGAN TRANSPLANT: Primary | ICD-10-CM

## 2021-02-05 DIAGNOSIS — Z79.899 IMMUNOSUPPRESSIVE MANAGEMENT ENCOUNTER FOLLOWING KIDNEY TRANSPLANT: ICD-10-CM

## 2021-02-05 DIAGNOSIS — Z94.0 IMMUNOSUPPRESSIVE MANAGEMENT ENCOUNTER FOLLOWING KIDNEY TRANSPLANT: ICD-10-CM

## 2021-02-05 DIAGNOSIS — Z48.298 AFTERCARE FOLLOWING ORGAN TRANSPLANT: ICD-10-CM

## 2021-02-05 LAB
ANION GAP SERPL CALCULATED.3IONS-SCNC: 5 MMOL/L (ref 3–14)
BUN SERPL-MCNC: 9 MG/DL (ref 7–30)
CALCIUM SERPL-MCNC: 9.1 MG/DL (ref 8.5–10.1)
CHLORIDE SERPL-SCNC: 110 MMOL/L (ref 94–109)
CO2 SERPL-SCNC: 27 MMOL/L (ref 20–32)
CREAT SERPL-MCNC: 0.7 MG/DL (ref 0.52–1.04)
ERYTHROCYTE [DISTWIDTH] IN BLOOD BY AUTOMATED COUNT: 12.9 % (ref 10–15)
GFR SERPL CREATININE-BSD FRML MDRD: >90 ML/MIN/{1.73_M2}
GLUCOSE SERPL-MCNC: 97 MG/DL (ref 70–99)
HCT VFR BLD AUTO: 41 % (ref 35–47)
HGB BLD-MCNC: 13.7 G/DL (ref 11.7–15.7)
MCH RBC QN AUTO: 28.7 PG (ref 26.5–33)
MCHC RBC AUTO-ENTMCNC: 33.4 G/DL (ref 31.5–36.5)
MCV RBC AUTO: 86 FL (ref 78–100)
PLATELET # BLD AUTO: 103 10E9/L (ref 150–450)
POTASSIUM SERPL-SCNC: 4 MMOL/L (ref 3.4–5.3)
RBC # BLD AUTO: 4.77 10E12/L (ref 3.8–5.2)
SODIUM SERPL-SCNC: 142 MMOL/L (ref 133–144)
TACROLIMUS BLD-MCNC: 5.9 UG/L (ref 5–15)
TME LAST DOSE: NORMAL H
WBC # BLD AUTO: 3.7 10E9/L (ref 4–11)

## 2021-02-05 PROCEDURE — 36415 COLL VENOUS BLD VENIPUNCTURE: CPT | Performed by: INTERNAL MEDICINE

## 2021-02-05 PROCEDURE — 85027 COMPLETE CBC AUTOMATED: CPT | Performed by: INTERNAL MEDICINE

## 2021-02-05 PROCEDURE — 80197 ASSAY OF TACROLIMUS: CPT | Performed by: INTERNAL MEDICINE

## 2021-02-05 PROCEDURE — 80048 BASIC METABOLIC PNL TOTAL CA: CPT | Performed by: INTERNAL MEDICINE

## 2021-05-13 ENCOUNTER — OFFICE VISIT (OUTPATIENT)
Dept: FAMILY MEDICINE | Facility: CLINIC | Age: 66
End: 2021-05-13
Payer: COMMERCIAL

## 2021-05-13 VITALS
HEIGHT: 60 IN | TEMPERATURE: 97.7 F | BODY MASS INDEX: 26.5 KG/M2 | WEIGHT: 135 LBS | HEART RATE: 80 BPM | SYSTOLIC BLOOD PRESSURE: 144 MMHG | OXYGEN SATURATION: 98 % | DIASTOLIC BLOOD PRESSURE: 94 MMHG

## 2021-05-13 DIAGNOSIS — Z12.31 ENCOUNTER FOR SCREENING MAMMOGRAM FOR BREAST CANCER: Primary | ICD-10-CM

## 2021-05-13 DIAGNOSIS — Z48.298 AFTERCARE FOLLOWING ORGAN TRANSPLANT: ICD-10-CM

## 2021-05-13 DIAGNOSIS — E55.9 VITAMIN D DEFICIENCY: ICD-10-CM

## 2021-05-13 DIAGNOSIS — Z13.220 SCREENING FOR CHOLESTEROL LEVEL: ICD-10-CM

## 2021-05-13 DIAGNOSIS — Z79.899 ENCOUNTER FOR LONG-TERM CURRENT USE OF MEDICATION: ICD-10-CM

## 2021-05-13 DIAGNOSIS — Z94.0 KIDNEY REPLACED BY TRANSPLANT: ICD-10-CM

## 2021-05-13 DIAGNOSIS — R10.9 STOMACH PAIN: ICD-10-CM

## 2021-05-13 DIAGNOSIS — R20.0 NUMBNESS AND TINGLING IN LEFT ARM: ICD-10-CM

## 2021-05-13 DIAGNOSIS — R20.2 NUMBNESS AND TINGLING IN LEFT ARM: ICD-10-CM

## 2021-05-13 LAB
ANION GAP SERPL CALCULATED.3IONS-SCNC: 6 MMOL/L (ref 3–14)
BUN SERPL-MCNC: 15 MG/DL (ref 7–30)
CALCIUM SERPL-MCNC: 8.8 MG/DL (ref 8.5–10.1)
CHLORIDE SERPL-SCNC: 105 MMOL/L (ref 94–109)
CHOLEST SERPL-MCNC: 217 MG/DL
CO2 SERPL-SCNC: 27 MMOL/L (ref 20–32)
CREAT SERPL-MCNC: 0.7 MG/DL (ref 0.52–1.04)
ERYTHROCYTE [DISTWIDTH] IN BLOOD BY AUTOMATED COUNT: 12.8 % (ref 10–15)
GFR SERPL CREATININE-BSD FRML MDRD: >90 ML/MIN/{1.73_M2}
GLUCOSE SERPL-MCNC: 96 MG/DL (ref 70–99)
HCT VFR BLD AUTO: 41.2 % (ref 35–47)
HDLC SERPL-MCNC: 82 MG/DL
HGB BLD-MCNC: 13.9 G/DL (ref 11.7–15.7)
LDLC SERPL CALC-MCNC: 118 MG/DL
MCH RBC QN AUTO: 28.9 PG (ref 26.5–33)
MCHC RBC AUTO-ENTMCNC: 33.7 G/DL (ref 31.5–36.5)
MCV RBC AUTO: 86 FL (ref 78–100)
NONHDLC SERPL-MCNC: 135 MG/DL
PLATELET # BLD AUTO: 98 10E9/L (ref 150–450)
POTASSIUM SERPL-SCNC: 4.3 MMOL/L (ref 3.4–5.3)
PROT UR-MCNC: 0.14 G/L
PROT/CREAT 24H UR: 0.25 G/G CR (ref 0–0.2)
RBC # BLD AUTO: 4.81 10E12/L (ref 3.8–5.2)
SODIUM SERPL-SCNC: 138 MMOL/L (ref 133–144)
TRIGL SERPL-MCNC: 83 MG/DL
VIT B12 SERPL-MCNC: 386 PG/ML (ref 193–986)
WBC # BLD AUTO: 3.9 10E9/L (ref 4–11)

## 2021-05-13 PROCEDURE — 80048 BASIC METABOLIC PNL TOTAL CA: CPT | Performed by: INTERNAL MEDICINE

## 2021-05-13 PROCEDURE — 99204 OFFICE O/P NEW MOD 45 MIN: CPT | Performed by: FAMILY MEDICINE

## 2021-05-13 PROCEDURE — 82607 VITAMIN B-12: CPT | Performed by: FAMILY MEDICINE

## 2021-05-13 PROCEDURE — 85027 COMPLETE CBC AUTOMATED: CPT | Performed by: INTERNAL MEDICINE

## 2021-05-13 PROCEDURE — 36415 COLL VENOUS BLD VENIPUNCTURE: CPT | Performed by: FAMILY MEDICINE

## 2021-05-13 PROCEDURE — 84156 ASSAY OF PROTEIN URINE: CPT | Performed by: INTERNAL MEDICINE

## 2021-05-13 PROCEDURE — 82306 VITAMIN D 25 HYDROXY: CPT | Performed by: INTERNAL MEDICINE

## 2021-05-13 PROCEDURE — 80061 LIPID PANEL: CPT | Performed by: FAMILY MEDICINE

## 2021-05-13 PROCEDURE — 80197 ASSAY OF TACROLIMUS: CPT | Performed by: INTERNAL MEDICINE

## 2021-05-13 RX ORDER — PANTOPRAZOLE SODIUM 40 MG/1
40 TABLET, DELAYED RELEASE ORAL
Qty: 60 TABLET | Refills: 1 | Status: SHIPPED | OUTPATIENT
Start: 2021-05-13 | End: 2021-09-13

## 2021-05-13 ASSESSMENT — MIFFLIN-ST. JEOR: SCORE: 1082.83

## 2021-05-13 NOTE — PROGRESS NOTES
"    Assessment & Plan     Encounter for screening mammogram for breast cancer    - *MA Screening Digital Bilateral; Future    Stomach pain    - pantoprazole (PROTONIX) 40 MG EC tablet; Take 1 tablet (40 mg) by mouth 2 times daily (before meals) Decrease to 1 per day after 2 weeks    Numbness and tingling in left arm    - Vitamin B12  - NEUROLOGY ADULT REFERRAL    Screening for cholesterol level    - Lipid panel reflex to direct LDL Fasting    Aftercare following organ transplant    - Tacrolimus level  - Basic metabolic panel  - CBC with platelets  - Protein  random urine with Creat Ratio    Kidney replaced by transplant    - Tacrolimus level  - Basic metabolic panel  - CBC with platelets  - Protein  random urine with Creat Ratio    Encounter for long-term current use of medication  Getting drawn to day   - Tacrolimus level  - Basic metabolic panel  - CBC with platelets  - Protein  random urine with Creat Ratio    Vitamin D deficiency    - Vitamin D Deficiency         BMI:   Estimated body mass index is 26.15 kg/m  as calculated from the following:    Height as of this encounter: 1.53 m (5' 0.25\").    Weight as of this encounter: 61.2 kg (135 lb).       See Patient Instructions    Return in about 4 weeks (around 6/10/2021) for follow up.    Betty Schwab MD  [facility] RIK Salas is a 65 year old who presents for the following health issues     HPI       Tingling in the left arm for 2 months.     Many months  Starvation pain in stomach  Maybe acid relux   Something feel off  Some nausea    Abdominal/Flank Pain  Onset/Duration: several months, maybe 3 months  Description:   Character: Dull ache and Gnawing  Location: epigastric region  Radiation: None  Intensity: mod   Progression of Symptoms:  worsening  Accompanying Signs & Symptoms:  Fever/Chills: no  Gas/Bloating: no  Nausea: YES  Vomitting: no  Diarrhea: no  Constipation: no  Dysuria or Hematuria: no  History:   Trauma: " "no  Previous similar pain: YES  Previous tests done: none  Precipitating factors:   Does the pain change with:     Food: YES    Bowel Movement: no    Urination: no   Other factors:  no  Therapies tried and outcome: Probiotics - hasn't helped  No LMP recorded. Patient is postmenopausal.    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      Objective    BP (!) 144/94 (BP Location: Right arm, Patient Position: Sitting, Cuff Size: Adult Regular)   Pulse 80   Temp 97.7  F (36.5  C) (Tympanic)   Ht 1.53 m (5' 0.25\")   Wt 61.2 kg (135 lb)   SpO2 98%   BMI 26.15 kg/m    Body mass index is 26.15 kg/m .  Physical Exam   GENERAL: healthy, alert and no distress  EYES: Eyes grossly normal to inspection, PERRL and conjunctivae and sclerae normal  NECK: no adenopathy, no asymmetry, masses, or scars and thyroid normal to palpation  NECK: non tender from   RESP: lungs clear to auscultation - no rales, rhonchi or wheezes  CV: regular rate and rhythm, normal S1 S2, no S3 or S4, no murmur, click or rub, no peripheral edema and peripheral pulses strong  ABDOMEN: soft, nontender, no hepatosplenomegaly, no masses and bowel sounds normal  MS: no gross musculoskeletal defects noted, no edema    Results for orders placed or performed in visit on 05/13/21 (from the past 24 hour(s))   CBC with platelets   Result Value Ref Range    WBC 3.9 (L) 4.0 - 11.0 10e9/L    RBC Count 4.81 3.8 - 5.2 10e12/L "    Hemoglobin 13.9 11.7 - 15.7 g/dL    Hematocrit 41.2 35.0 - 47.0 %    MCV 86 78 - 100 fl    MCH 28.9 26.5 - 33.0 pg    MCHC 33.7 31.5 - 36.5 g/dL    RDW 12.8 10.0 - 15.0 %    Platelet Count 98 (L) 150 - 450 10e9/L       Betty Schwab M.D.

## 2021-05-13 NOTE — PATIENT INSTRUCTIONS
Pantoprazole 40 mg 2 times per day for 2 weeks then 1 time per day  If not improving   let me know and I will order an egd   You can schedule the nerve tests  I will see you back after the nerve test is done  To reach our office directly call [phone] or [phone].

## 2021-05-14 LAB
DEPRECATED CALCIDIOL+CALCIFEROL SERPL-MC: 33 UG/L (ref 20–75)
TACROLIMUS BLD-MCNC: 4.9 UG/L (ref 5–15)
TME LAST DOSE: ABNORMAL H

## 2021-05-25 ENCOUNTER — RECORDS - HEALTHEAST (OUTPATIENT)
Dept: ADMINISTRATIVE | Facility: CLINIC | Age: 66
End: 2021-05-25

## 2021-05-27 ENCOUNTER — RECORDS - HEALTHEAST (OUTPATIENT)
Dept: ADMINISTRATIVE | Facility: CLINIC | Age: 66
End: 2021-05-27

## 2021-05-30 ENCOUNTER — RECORDS - HEALTHEAST (OUTPATIENT)
Dept: ADMINISTRATIVE | Facility: CLINIC | Age: 66
End: 2021-05-30

## 2021-06-02 ENCOUNTER — RECORDS - HEALTHEAST (OUTPATIENT)
Dept: ADMINISTRATIVE | Facility: CLINIC | Age: 66
End: 2021-06-02

## 2021-07-21 ENCOUNTER — RECORDS - HEALTHEAST (OUTPATIENT)
Dept: ADMINISTRATIVE | Facility: CLINIC | Age: 66
End: 2021-07-21

## 2021-08-13 DIAGNOSIS — I10 ESSENTIAL HYPERTENSION WITH GOAL BLOOD PRESSURE LESS THAN 130/80: ICD-10-CM

## 2021-08-13 DIAGNOSIS — Z94.0 KIDNEY TRANSPLANTED: ICD-10-CM

## 2021-08-13 DIAGNOSIS — Z94.0 KIDNEY REPLACED BY TRANSPLANT: ICD-10-CM

## 2021-08-13 DIAGNOSIS — I15.1 HYPERTENSION SECONDARY TO OTHER RENAL DISORDERS: ICD-10-CM

## 2021-08-13 DIAGNOSIS — Z94.0 KIDNEY TRANSPLANT RECIPIENT: ICD-10-CM

## 2021-08-13 DIAGNOSIS — Z79.60 LONG-TERM USE OF IMMUNOSUPPRESSANT MEDICATION: ICD-10-CM

## 2021-08-13 RX ORDER — AMLODIPINE BESYLATE 5 MG/1
TABLET ORAL
Qty: 90 TABLET | Refills: 0 | Status: SHIPPED | OUTPATIENT
Start: 2021-08-13 | End: 2022-01-10

## 2021-09-10 ENCOUNTER — LAB (OUTPATIENT)
Dept: LAB | Facility: CLINIC | Age: 66
End: 2021-09-10
Payer: COMMERCIAL

## 2021-09-10 DIAGNOSIS — Z79.899 ENCOUNTER FOR LONG-TERM CURRENT USE OF MEDICATION: ICD-10-CM

## 2021-09-10 DIAGNOSIS — Z48.298 AFTERCARE FOLLOWING ORGAN TRANSPLANT: ICD-10-CM

## 2021-09-10 DIAGNOSIS — Z94.0 KIDNEY REPLACED BY TRANSPLANT: ICD-10-CM

## 2021-09-10 LAB
ANION GAP SERPL CALCULATED.3IONS-SCNC: 3 MMOL/L (ref 3–14)
BUN SERPL-MCNC: 11 MG/DL (ref 7–30)
CALCIUM SERPL-MCNC: 9 MG/DL (ref 8.5–10.1)
CHLORIDE BLD-SCNC: 110 MMOL/L (ref 94–109)
CO2 SERPL-SCNC: 30 MMOL/L (ref 20–32)
CREAT SERPL-MCNC: 0.69 MG/DL (ref 0.52–1.04)
ERYTHROCYTE [DISTWIDTH] IN BLOOD BY AUTOMATED COUNT: 13.1 % (ref 10–15)
GFR SERPL CREATININE-BSD FRML MDRD: >90 ML/MIN/1.73M2
GLUCOSE BLD-MCNC: 98 MG/DL (ref 70–99)
HCT VFR BLD AUTO: 41.8 % (ref 35–47)
HGB BLD-MCNC: 14.1 G/DL (ref 11.7–15.7)
MCH RBC QN AUTO: 29 PG (ref 26.5–33)
MCHC RBC AUTO-ENTMCNC: 33.7 G/DL (ref 31.5–36.5)
MCV RBC AUTO: 86 FL (ref 78–100)
PLATELET # BLD AUTO: 108 10E3/UL (ref 150–450)
POTASSIUM BLD-SCNC: 4.1 MMOL/L (ref 3.4–5.3)
RBC # BLD AUTO: 4.87 10E6/UL (ref 3.8–5.2)
SODIUM SERPL-SCNC: 143 MMOL/L (ref 133–144)
TACROLIMUS BLD-MCNC: 4.9 UG/L (ref 5–15)
TME LAST DOSE: ABNORMAL H
TME LAST DOSE: ABNORMAL H
WBC # BLD AUTO: 4.4 10E3/UL (ref 4–11)

## 2021-09-10 PROCEDURE — 80197 ASSAY OF TACROLIMUS: CPT

## 2021-09-10 PROCEDURE — 80048 BASIC METABOLIC PNL TOTAL CA: CPT

## 2021-09-10 PROCEDURE — 36415 COLL VENOUS BLD VENIPUNCTURE: CPT

## 2021-09-10 PROCEDURE — 85027 COMPLETE CBC AUTOMATED: CPT

## 2021-09-13 ENCOUNTER — OFFICE VISIT (OUTPATIENT)
Dept: NEPHROLOGY | Facility: CLINIC | Age: 66
End: 2021-09-13
Attending: INTERNAL MEDICINE
Payer: COMMERCIAL

## 2021-09-13 VITALS
OXYGEN SATURATION: 96 % | DIASTOLIC BLOOD PRESSURE: 82 MMHG | HEART RATE: 88 BPM | BODY MASS INDEX: 25.76 KG/M2 | SYSTOLIC BLOOD PRESSURE: 150 MMHG | WEIGHT: 133 LBS

## 2021-09-13 DIAGNOSIS — Z79.60 LONG-TERM USE OF IMMUNOSUPPRESSANT MEDICATION: ICD-10-CM

## 2021-09-13 DIAGNOSIS — Z94.0 KIDNEY TRANSPLANT RECIPIENT: ICD-10-CM

## 2021-09-13 DIAGNOSIS — Z94.0 KIDNEY REPLACED BY TRANSPLANT: ICD-10-CM

## 2021-09-13 DIAGNOSIS — I15.1 HYPERTENSION SECONDARY TO OTHER RENAL DISORDERS: ICD-10-CM

## 2021-09-13 DIAGNOSIS — I10 ESSENTIAL HYPERTENSION WITH GOAL BLOOD PRESSURE LESS THAN 130/80: ICD-10-CM

## 2021-09-13 DIAGNOSIS — Z94.0 KIDNEY TRANSPLANTED: ICD-10-CM

## 2021-09-13 PROCEDURE — 99214 OFFICE O/P EST MOD 30 MIN: CPT | Performed by: INTERNAL MEDICINE

## 2021-09-13 RX ORDER — TACROLIMUS 0.5 MG/1
0.5 CAPSULE ORAL 2 TIMES DAILY
Qty: 60 CAPSULE | Refills: 11 | COMMUNITY
Start: 2021-09-13 | End: 2023-02-03

## 2021-09-13 ASSESSMENT — PAIN SCALES - GENERAL: PAINLEVEL: NO PAIN (0)

## 2021-09-13 NOTE — NURSING NOTE
Chief Complaint   Patient presents with     RECHECK     Kidney tx f/u     Blood pressure (!) 150/82, pulse 88, weight 60.3 kg (133 lb), SpO2 96 %.    Nitza Taylor, CMA

## 2021-09-13 NOTE — PROGRESS NOTES
CHRONIC TRANSPLANT NEPHROLOGY VISIT    Assessment & Plan      # LDKT: Stable   - Baseline Cr ~ 0.7-0.9   - Proteinuria: Minimal (0.2-0.5 grams)   - Date DSA Last Checked: Sep/2020       Latest DSA: No   - BK Viremia: Not checked recently due to time from transplant    - Kidney Tx Biopsy: No    Advised her to continue with quarterly labs     # Immunosuppression: Tacrolimus immediate release (goal 4-6) and Mycophenolate mofetil (goal not followed)   - Changes: Not at this time  Tac trough 4.9 ( 9/9/21)     # Prophylaxis:   - PJP: None. No   Advised her to check the CD4 count to ensure she is not at risk for PJP PNA, she would prefer to check it with her next blood work and not now      # Hypertension: Controlled; Goal BP: < 130/80   - Changes: No, Continue amlodipine 5 mg daily    # Mineral Bone Disorder:   - Vitamin D; level: Normal , on  1000 Units daily   - Calcium; level: Normal    # Thrombocytopenia: Stable platelet count running ~ 's.    # Alopecia: Mostly stable. Felt likely at least partly due to tacrolimus, but patient isn't interested in changing immunosuppression.    # Skin Cancer Risk:    - Discussed sun protection and recommend regular follow up with Dermatology.              -      # Medical Compliance: No.  Evidence of labs less than recommended.  - Discussed importance of checking labs regularly as recommended, taking medications as prescribed and attending scheduled medical appointments.    # Shingles: Will talk to her PCP about getting the vaccination. She was advised she can take SHINGRIX      # COVID-19 Virus Review: Discussed COVID-19 virus and the potential medical risks.  Reviewed preventative health recommendations, which includes washing hands for at least  20 seconds, avoid touching your face, and social distancing.  Asked patient to inform the transplant center if they are exposed or diagnosed with this virus.  --  COVID19 Vaccine status :  Completed Pfizer April 2021   --  Patient  notified that patient is eligible now for booster shot :Pfizer    # flu vaccine : She was advised to take flu vaccine but she declines due to past h/o falling sick with the vaccine       # Transplant History:  Etiology of kidney failure: Polycystic kidney disease (PKD)  Tx: LDKT  Transplant: 9/29/2015 (Kidney)  Donor Type: Living Donor Class:   Significant changes in immunosuppression: None  Significant transplant-related complications: None    Transplant Office Phone Number: [phone]    Assessment and plan was discussed with the patient and she voiced her understanding and agreement.    Return visit: Return in about 1 year (around 9/13/2022).  Patient seen and discussed with Dr Evgeny Dennis MD, FACP,[facility]  Transplant Nephrology Fellow   [facility]   Pager [phone]    Chief Complaint   Ms. Navarro is a 65 year old here for routine follow up.    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  No SOB/CP  No dizziness/lightheadedness  No Leg swelling   No cough/fever/chills  Appetite is good. No unintentional weight loss   No Abd pain/N/V/D/Constipation.   No voiding difficulty/hematuria /flank pain  No focal weakness/altered sensation.  No rash   Has hair loss   Mood is stable       Review of Systems   A comprehensive review of systems was obtained and negative, except as noted in the HPI or PMH.    Problem List   Patient Active Problem List   Diagnosis     Polycystic kidney disease     HTN, kidney transplant related     Vitamin D deficiency     Dyslipidemia     Polycystic liver disease     Kidney replaced by transplant     Immunosuppression (H)     Thrombocytopenia (H)     Steroid-induced hyperglycemia     Aftercare following organ transplant     Alopecia       Social History   Social History     Tobacco Use     Smoking status: Never Smoker     Smokeless tobacco: Never Used   Substance Use Topics     Alcohol  use: Yes     Alcohol/week: 0.8 standard drinks     Types: 1 Standard drinks or equivalent per week     Drug use: No       Allergies   Allergies   Allergen Reactions     Contrast Dye Unknown     Mold Unknown       Medications   Current Outpatient Medications   Medication Sig     amLODIPine (NORVASC) 5 MG tablet TAKE 1 TABLET BY MOUTH AT BEDTIME     mycophenolate (GENERIC EQUIVALENT) 250 MG capsule Take 3 capsules (750 mg) by mouth 2 times daily     pantoprazole (PROTONIX) 40 MG EC tablet Take 1 tablet (40 mg) by mouth 2 times daily (before meals) Decrease to 1 per day after 2 weeks     sulfamethoxazole-trimethoprim (BACTRIM) 400-80 MG tablet Take 1 tablet by mouth daily     tacrolimus (GENERIC EQUIVALENT) 0.5 MG capsule Take 1 capsule (0.5 mg) by mouth 2 times daily     tacrolimus (GENERIC EQUIVALENT) 1 MG capsule Take 1 capsule (1 mg) by mouth 2 times daily     Vitamin D, Cholecalciferol, 25 MCG (1000 UT) TABS Take 1 tablet (1,000 Units) by mouth daily     No current facility-administered medications for this visit.     There are no discontinued medications.    Physical Exam   Vital Signs: There were no vitals taken for this visit.    No vitals were taken for this telemedicine visit    Data     Renal Latest Ref Rng & Units 9/10/2021 5/13/2021 2/5/2021   Na 133 - 144 mmol/L 143 138 142   K 3.4 - 5.3 mmol/L 4.1 4.3 4.0   Cl 94 - 109 mmol/L 110(H) 105 110(H)   CO2 20 - 32 mmol/L 30 27 27   BUN 7 - 30 mg/dL 11 15 9   Cr 0.52 - 1.04 mg/dL 0.69 0.70 0.70   Glucose 70 - 99 mg/dL 98 96 97   Ca  8.5 - 10.1 mg/dL 9.0 8.8 9.1   Mg 1.6 - 2.3 mg/dL - - -     Bone Health Latest Ref Rng & Units 5/13/2021 2/19/2020 9/18/2017   Phos 2.5 - 4.5 mg/dL - - -   PTHi 12 - 72 pg/mL - - -   Vit D Def 20 - 75 ug/L 33 33 34     Heme Latest Ref Rng & Units 9/10/2021 5/13/2021 2/5/2021   WBC 4.0 - 11.0 10e3/uL 4.4 3.9(L) 3.7(L)   Hgb 11.7 - 15.7 g/dL 14.1 13.9 13.7   Plt 150 - 450 10e3/uL 108(L) 98(L) 103(L)   ABSOLUTE NEUTROPHIL 1.6 - 8.3  10e9/L - - -   ABSOLUTE LYMPHOCYTES 0.8 - 5.3 10e9/L - - -   ABSOLUTE MONOCYTES 0.0 - 1.3 10e9/L - - -   ABSOLUTE EOSINOPHILS 0.0 - 0.7 10e9/L - - -   ABSOLUTE BASOPHILS 0.0 - 0.2 10e9/L - - -   ABS IMMATURE GRANULOCYTES 0 - 0.4 10e9/L - - -     Liver Latest Ref Rng & Units 4/25/2016 9/28/2015 1/26/2015   AP 40 - 150 U/L 191(H) 342(H) 209(H)   TBili 0.2 - 1.3 mg/dL 0.5 0.3 0.3   DBili 0.0 - 0.2 mg/dL 0.2 - -   ALT 0 - 50 U/L 36 68(H) 31   AST 0 - 45 U/L 27 45 22   Tot Protein 6.8 - 8.8 g/dL 6.7(L) 6.9 7.0   Albumin 3.4 - 5.0 g/dL 3.5 3.6 3.5     Pancreas Latest Ref Rng & Units 9/30/2015   A1C 4.3 - 6.0 % 5.5     Iron studies Latest Ref Rng & Units 10/8/2015   Iron 35 - 180 ug/dL 69   Iron sat 15 - 46 % 28   Ferritin 8 - 252 ng/mL 865(H)     [facility] Txp Virology Latest Ref Rng & Units 5/31/2018 12/27/2017 9/2/2016   CVM DNA Quant - - - Plasma   CMV QUANT IU/ML CMVND [IU]/mL - - CMV DNA Not Detected   The M[employer] AmpliPrep/MADDISON TaqMan CMV Test is an FDA-approved in vitro nucleic   acid amplification test for the quantitation of cytomegalovirus DNA in human   plasma (EDTA plasma) using the M[employer] AmpliPrep Instrument for automated viral   nucleic acid extraction and the [employer] TaqMan Analyzer or [employer] Software for   automated Real Time amplification and detection of the viral nucleic acid   target.   Titer results are reported in International Units/mL (IU/mL using 1st WHO   International standard for Human Cytomegalovirus for Nucleic Acid Amplification   based assays. The conversion factor between CMV DNA copis/mL (as defined by the   Roche MADDISON TaqMan CMV test) and International Units is the CMV DNA   concentration in IU/mL x 1.1 copies/IU = CMV DNA in copies/mL.   This assay has received FDA approval for the testing of human plasma only. The   Infectious Disease Diagnostic Laboratory at the [facility], [place], has validated the pe  rformance characteristics of the Roche   CMV assay for  plasma, bronchial alveolar lavage/wash and urine.     LOG IU/ML OF CMVQNT <2.1 [Log:IU]/mL - - Not Calculated   BK Spec - Plasma Plasma -   BK Res BKNEG:BK Virus DNA Not Detected copies/mL BK Virus DNA Not Detected BK Virus DNA Not Detected -   BK Log <2.7 Log copies/mL Not Calculated Not Calculated -   EBV CAPSID ANTIBODY IGG 0.0 - 0.8 AI - - -   EBV DNA COPIES/ML EBVNEG [Copies]/mL - - EBV DNA Not Detected   EBV DNA LOG OF COPIES <2.7 [Log:copies]/mL - - Not Calculated   The Real-Time quantitative EBV assay was developed and its performance   characteristics determined by the Infectious Diseases Diagnostic Laboratory at   the [facility] in [place], [place].  The   primers and probes are Analyte Specific Reagents (ASRs) manufactured  by   Qiagen.   ASRs are used in many laboratory tests necessary for standard medical care and   generally do not require U.S. Food and Drug Administration approval.  The FDA   has determined that such clearance or approval is not necessary.  This test is   used for clinical purposes.  It should not be regarded as investigational or   research.   This laboratory is certified under the Clinical Laboratory Improvement   Amendments of 1988 (CLIA-88) as qualified to perform high complexity clinical   laboratory testing.   The quantitative range of this assay is 500-22,500,00 copies/mL (2.7-7.4 log   copies/mL).  A negative result does not rule out the presence of PCR inhibitors     in the patient specimen or EBV DNA nucleic acid in concentrations below the   level of detection of the assay.  Inhibition may also lead to underestimation   of viral quantitation.     Hep B Core NR - - -        Recent Labs   Lab Test 02/05/21  0900 05/13/21  0931 09/10/21  0927   DOSTAC 2130 2/4/21 9PM 5/12/21 9/9/2021   TACROL 5.9 4.9* 4.9*     Recent Labs   Lab Test 12/21/15  0909 12/28/15  0917 10/17/16  0935   DOSMPA LD 12/20/15 2100 LD 12/27/2015 2100 ld 10/16/16 2100    MPACID 2.66 4.66* 5.02*   MPAG 31.8 37.3 51.7       This patient has been seen and evaluated by me, Raghav Sanchez MD.  I have reviewed the note and agree with plan of care as documented by the fellow.  Raghav Sanchez MD on 9/13/2021 at 4:01 PM

## 2021-09-13 NOTE — PATIENT INSTRUCTIONS
Please take your covid 19 booster shot ( Pfizer)  Please take your flu shot , 2 weeks after taking your covid19 booster shot    Will check CD4 count to assess if you need to restart Bactrim . Lower CD4 count ( <200) is associated with PJP pneumonia

## 2021-09-13 NOTE — LETTER
9/13/2021       RE: Maritza Navarro  [address] [facility] [address]     Dear Colleague,    Thank you for referring your patient, Maritza Navarro, to the [facility] [place] [facility]. Please see a copy of my visit note below.    CHRONIC TRANSPLANT NEPHROLOGY VISIT    Assessment & Plan      # LDKT: Stable   - Baseline Cr ~ 0.7-0.9   - Proteinuria: Minimal (0.2-0.5 grams)   - Date DSA Last Checked: Sep/2020       Latest DSA: No   - BK Viremia: Not checked recently due to time from transplant    - Kidney Tx Biopsy: No    Advised her to continue with quarterly labs     # Immunosuppression: Tacrolimus immediate release (goal 4-6) and Mycophenolate mofetil (goal not followed)   - Changes: Not at this time  Tac trough 4.9 ( 9/9/21)     # Prophylaxis:   - PJP: None. No   Advised her to check the CD4 count to ensure she is not at risk for PJP PNA, she would prefer to check it with her next blood work and not now      # Hypertension: Controlled; Goal BP: < 130/80   - Changes: No, Continue amlodipine 5 mg daily    # Mineral Bone Disorder:   - Vitamin D; level: Normal , on  1000 Units daily   - Calcium; level: Normal    # Thrombocytopenia: Stable platelet count running ~ 's.    # Alopecia: Mostly stable. Felt likely at least partly due to tacrolimus, but patient isn't interested in changing immunosuppression.    # Skin Cancer Risk:    - Discussed sun protection and recommend regular follow up with Dermatology.              -      # Medical Compliance: No.  Evidence of labs less than recommended.  - Discussed importance of checking labs regularly as recommended, taking medications as prescribed and attending scheduled medical appointments.    # Shingles: Will talk to her PCP about getting the vaccination. She was advised she can take SHINGRIX      # COVID-19 Virus Review: Discussed COVID-19 virus and the potential medical risks.  Reviewed  preventative health recommendations, which includes washing hands for at least  20 seconds, avoid touching your face, and social distancing.  Asked patient to inform the transplant center if they are exposed or diagnosed with this virus.  --  COVID19 Vaccine status :  Completed Pfizer April 2021   --  Patient notified that patient is eligible now for booster shot :Pfizer    # flu vaccine : She was advised to take flu vaccine but she declines due to past h/o falling sick with the vaccine       # Transplant History:  Etiology of kidney failure: Polycystic kidney disease (PKD)  Tx: LDKT  Transplant: 9/29/2015 (Kidney)  Donor Type: Living Donor Class:   Significant changes in immunosuppression: None  Significant transplant-related complications: None    Transplant Office Phone Number: [phone]    Assessment and plan was discussed with the patient and she voiced her understanding and agreement.    Return visit: Return in about 1 year (around 9/13/2022).  Patient seen and discussed with Dr Evgeny Dennis MD, FACP,[facility]  Transplant Nephrology Fellow   [facility]   Pager [phone]    Chief Complaint   Ms. Navarro is a 65 year old here for routine follow up.    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  No SOB/CP  No dizziness/lightheadedness  No Leg swelling   No cough/fever/chills  Appetite is good. No unintentional weight loss   No Abd pain/N/V/D/Constipation.   No voiding difficulty/hematuria /flank pain  No focal weakness/altered sensation.  No rash   Has hair loss   Mood is stable       Review of Systems   A comprehensive review of systems was obtained and negative, except as noted in the HPI or PMH.    Problem List   Patient Active Problem List   Diagnosis     Polycystic kidney disease     HTN, kidney transplant related     Vitamin D deficiency     Dyslipidemia     Polycystic liver disease     Kidney replaced  by transplant     Immunosuppression (H)     Thrombocytopenia (H)     Steroid-induced hyperglycemia     Aftercare following organ transplant     Alopecia       Social History   Social History     Tobacco Use     Smoking status: Never Smoker     Smokeless tobacco: Never Used   Substance Use Topics     Alcohol use: Yes     Alcohol/week: 0.8 standard drinks     Types: 1 Standard drinks or equivalent per week     Drug use: No       Allergies   Allergies   Allergen Reactions     Contrast Dye Unknown     Mold Unknown       Medications   Current Outpatient Medications   Medication Sig     amLODIPine (NORVASC) 5 MG tablet TAKE 1 TABLET BY MOUTH AT BEDTIME     mycophenolate (GENERIC EQUIVALENT) 250 MG capsule Take 3 capsules (750 mg) by mouth 2 times daily     pantoprazole (PROTONIX) 40 MG EC tablet Take 1 tablet (40 mg) by mouth 2 times daily (before meals) Decrease to 1 per day after 2 weeks     sulfamethoxazole-trimethoprim (BACTRIM) 400-80 MG tablet Take 1 tablet by mouth daily     tacrolimus (GENERIC EQUIVALENT) 0.5 MG capsule Take 1 capsule (0.5 mg) by mouth 2 times daily     tacrolimus (GENERIC EQUIVALENT) 1 MG capsule Take 1 capsule (1 mg) by mouth 2 times daily     Vitamin D, Cholecalciferol, 25 MCG (1000 UT) TABS Take 1 tablet (1,000 Units) by mouth daily     No current facility-administered medications for this visit.     There are no discontinued medications.    Physical Exam   Vital Signs: There were no vitals taken for this visit.    No vitals were taken for this telemedicine visit    Data     Renal Latest Ref Rng & Units 9/10/2021 5/13/2021 2/5/2021   Na 133 - 144 mmol/L 143 138 142   K 3.4 - 5.3 mmol/L 4.1 4.3 4.0   Cl 94 - 109 mmol/L 110(H) 105 110(H)   CO2 20 - 32 mmol/L 30 27 27   BUN 7 - 30 mg/dL 11 15 9   Cr 0.52 - 1.04 mg/dL 0.69 0.70 0.70   Glucose 70 - 99 mg/dL 98 96 97   Ca  8.5 - 10.1 mg/dL 9.0 8.8 9.1   Mg 1.6 - 2.3 mg/dL - - -     Bone Health Latest Ref Rng & Units 5/13/2021 2/19/2020 9/18/2017    Phos 2.5 - 4.5 mg/dL - - -   PTHi 12 - 72 pg/mL - - -   Vit D Def 20 - 75 ug/L 33 33 34     Heme Latest Ref Rng & Units 9/10/2021 5/13/2021 2/5/2021   WBC 4.0 - 11.0 10e3/uL 4.4 3.9(L) 3.7(L)   Hgb 11.7 - 15.7 g/dL 14.1 13.9 13.7   Plt 150 - 450 10e3/uL 108(L) 98(L) 103(L)   ABSOLUTE NEUTROPHIL 1.6 - 8.3 10e9/L - - -   ABSOLUTE LYMPHOCYTES 0.8 - 5.3 10e9/L - - -   ABSOLUTE MONOCYTES 0.0 - 1.3 10e9/L - - -   ABSOLUTE EOSINOPHILS 0.0 - 0.7 10e9/L - - -   ABSOLUTE BASOPHILS 0.0 - 0.2 10e9/L - - -   ABS IMMATURE GRANULOCYTES 0 - 0.4 10e9/L - - -     Liver Latest Ref Rng & Units 4/25/2016 9/28/2015 1/26/2015   AP 40 - 150 U/L 191(H) 342(H) 209(H)   TBili 0.2 - 1.3 mg/dL 0.5 0.3 0.3   DBili 0.0 - 0.2 mg/dL 0.2 - -   ALT 0 - 50 U/L 36 68(H) 31   AST 0 - 45 U/L 27 45 22   Tot Protein 6.8 - 8.8 g/dL 6.7(L) 6.9 7.0   Albumin 3.4 - 5.0 g/dL 3.5 3.6 3.5     Pancreas Latest Ref Rng & Units 9/30/2015   A1C 4.3 - 6.0 % 5.5     Iron studies Latest Ref Rng & Units 10/8/2015   Iron 35 - 180 ug/dL 69   Iron sat 15 - 46 % 28   Ferritin 8 - 252 ng/mL 865(H)     [facility] Txp Virology Latest Ref Rng & Units 5/31/2018 12/27/2017 9/2/2016   CVM DNA Quant - - - Plasma   CMV QUANT IU/ML CMVND [IU]/mL - - CMV DNA Not Detected   The M[employer] AmpliPrep/MADDISON TaqMan CMV Test is an FDA-approved in vitro nucleic   acid amplification test for the quantitation of cytomegalovirus DNA in human   plasma (EDTA plasma) using the [employer] AmpliPrep Instrument for automated viral   nucleic acid extraction and the [employer] TaqMan Analyzer or M[employer]ON TaqMan for   automated Real Time amplification and detection of the viral nucleic acid   target.   Titer results are reported in International Units/mL (IU/mL using 1st WHO   International standard for Human Cytomegalovirus for Nucleic Acid Amplification   based assays. The conversion factor between CMV DNA copis/mL (as defined by the   Roche MADDISON TaqMan CMV test) and International Units is the CMV DNA   concentration in  IU/mL x 1.1 copies/IU = CMV DNA in copies/mL.   This assay has received FDA approval for the testing of human plasma only. The   Infectious Disease Diagnostic Laboratory at the [facility], [place], has validated the pe  rformance characteristics of the Roche   CMV assay for plasma, bronchial alveolar lavage/wash and urine.     LOG IU/ML OF CMVQNT <2.1 [Log:IU]/mL - - Not Calculated   BK Spec - Plasma Plasma -   BK Res BKNEG:BK Virus DNA Not Detected copies/mL BK Virus DNA Not Detected BK Virus DNA Not Detected -   BK Log <2.7 Log copies/mL Not Calculated Not Calculated -   EBV CAPSID ANTIBODY IGG 0.0 - 0.8 AI - - -   EBV DNA COPIES/ML EBVNEG [Copies]/mL - - EBV DNA Not Detected   EBV DNA LOG OF COPIES <2.7 [Log:copies]/mL - - Not Calculated   The Real-Time quantitative EBV assay was developed and its performance   characteristics determined by the Infectious Diseases Diagnostic Laboratory at   the [facility] in [place], [place].  The   primers and probes are Analyte Specific Reagents (ASRs) manufactured  by   Qiagen.   ASRs are used in many laboratory tests necessary for standard medical care and   generally do not require U.S. Food and Drug Administration approval.  The FDA   has determined that such clearance or approval is not necessary.  This test is   used for clinical purposes.  It should not be regarded as investigational or   research.   This laboratory is certified under the Clinical Laboratory Improvement   Amendments of 1988 (CLIA-88) as qualified to perform high complexity clinical   laboratory testing.   The quantitative range of this assay is 500-22,500,00 copies/mL (2.7-7.4 log   copies/mL).  A negative result does not rule out the presence of PCR inhibitors     in the patient specimen or EBV DNA nucleic acid in concentrations below the   level of detection of the assay.  Inhibition may also lead to underestimation   of viral  quantitation.     Hep B Core NR - - -        Recent Labs   Lab Test 02/05/21  0900 05/13/21  0931 09/10/21  0927   DOSTAC 2130 2/4/21 9PM 5/12/21 9/9/2021   TACROL 5.9 4.9* 4.9*     Recent Labs   Lab Test 12/21/15  0909 12/28/15  0917 10/17/16  0935   DOSMPA LD 12/20/15 2100 LD 12/27/2015 2100 ld 10/16/16 2100   MPACID 2.66 4.66* 5.02*   MPAG 31.8 37.3 51.7       This patient has been seen and evaluated by me, Raghav Sanchez MD.  I have reviewed the note and agree with plan of care as documented by the fellow.  Raghav Sanchez MD on 9/13/2021 at 4:01 PM            Again, thank you for allowing me to participate in the care of your patient.      Sincerely,    Raghav Sanchez MD

## 2021-09-28 DIAGNOSIS — Z94.0 KIDNEY TRANSPLANT RECIPIENT: ICD-10-CM

## 2021-09-28 DIAGNOSIS — Z79.60 LONG-TERM USE OF IMMUNOSUPPRESSANT MEDICATION: ICD-10-CM

## 2021-09-28 DIAGNOSIS — Z94.0 KIDNEY TRANSPLANTED: Primary | ICD-10-CM

## 2021-09-28 DIAGNOSIS — I10 ESSENTIAL HYPERTENSION WITH GOAL BLOOD PRESSURE LESS THAN 130/80: ICD-10-CM

## 2021-09-28 DIAGNOSIS — I15.1 HYPERTENSION SECONDARY TO OTHER RENAL DISORDERS: ICD-10-CM

## 2021-09-28 DIAGNOSIS — Z94.0 KIDNEY REPLACED BY TRANSPLANT: ICD-10-CM

## 2021-09-29 RX ORDER — TACROLIMUS 1 MG/1
1 CAPSULE ORAL 2 TIMES DAILY
Qty: 60 CAPSULE | Refills: 11 | Status: SHIPPED | OUTPATIENT
Start: 2021-09-29 | End: 2022-10-07

## 2021-10-24 ENCOUNTER — HEALTH MAINTENANCE LETTER (OUTPATIENT)
Age: 66
End: 2021-10-24

## 2021-10-27 ENCOUNTER — TELEPHONE (OUTPATIENT)
Dept: TRANSPLANT | Facility: CLINIC | Age: 66
End: 2021-10-27

## 2021-10-27 NOTE — TELEPHONE ENCOUNTER
Patient Call: General  Route to LPN    Reason for call: Pt had COVID test done on Mon  Tested positive had some symptoms last Fri/Sat  Sinus type   Symptoms otherwise felt OK      Call back needed? Yes    Return Call Needed  Same as documented in contacts section  When to return call?: Greater than one day: Route standard priority

## 2021-10-27 NOTE — TELEPHONE ENCOUNTER
Original IS:  Tacrolimus 1.5 mg BID   mg BID    10/25/21 - positive    PLAN:  Call Maritza Navarro and confirm IS meds and symptoms.    OUTCOME:  Left VM requesting call back.

## 2021-10-29 NOTE — TELEPHONE ENCOUNTER
COVID-19 vaccine 2 doses (Pfizer) completed.    Original IS:  Tacrolimus 1 mg BID   mg BID    10/25/21 - positive    Bad sinus infection: Sore throat, headache, sinus pressure, some nausea, fatigue  No lingering fever. Have not checked her temperature for 2 days.  Link for monoclonal antibodies sent via [employer].  Message sent to dr. Brooke for recs.

## 2021-11-01 ENCOUNTER — TELEPHONE (OUTPATIENT)
Dept: TRANSPLANT | Facility: CLINIC | Age: 66
End: 2021-11-01

## 2021-11-01 NOTE — TELEPHONE ENCOUNTER
Maxim Brooke MD [place], [place], LB  Yes, agree with the plan       ----- Message -----   From: Leia Fernandes RN   Sent: 10/29/2021   2:15 PM CDT   To: Maxim Brooke MD   Subject: COVID-19 positive                                 Dr. Brooke, (pt prefers to follow with you)     COVID-19 vaccine 2 doses (Pfizer) completed.     10/25/21 - positive     Original IS:   Tacrolimus 1 mg BID    mg BID     States feels like she has a bad sinus infection   Sore throat, headache, sinus pressure, some nausea, fatigue   No lingering fever. Have not checked her temperature for 2 days.   Link for monoclonal antibodies sent via [employer].     Decrease MMF to 500 mg BID?   Re-assess in 1 week?     Thanks,   Salo      [employer] message sent to decrease MMF dose to 500 mg BID x 1 week and reassess.

## 2021-11-03 DIAGNOSIS — Z79.60 LONG-TERM USE OF IMMUNOSUPPRESSANT MEDICATION: ICD-10-CM

## 2021-11-03 DIAGNOSIS — I15.1 HYPERTENSION SECONDARY TO OTHER RENAL DISORDERS: ICD-10-CM

## 2021-11-03 DIAGNOSIS — Z94.0 KIDNEY TRANSPLANTED: Primary | ICD-10-CM

## 2021-11-03 DIAGNOSIS — I10 ESSENTIAL HYPERTENSION WITH GOAL BLOOD PRESSURE LESS THAN 130/80: ICD-10-CM

## 2021-11-03 DIAGNOSIS — Z94.0 KIDNEY REPLACED BY TRANSPLANT: ICD-10-CM

## 2021-11-03 DIAGNOSIS — Z94.0 KIDNEY TRANSPLANT RECIPIENT: ICD-10-CM

## 2021-11-03 RX ORDER — MYCOPHENOLATE MOFETIL 250 MG/1
750 CAPSULE ORAL 2 TIMES DAILY
Qty: 540 CAPSULE | Refills: 3 | Status: SHIPPED | OUTPATIENT
Start: 2021-11-03 | End: 2022-12-02

## 2021-11-10 ENCOUNTER — TELEPHONE (OUTPATIENT)
Dept: TRANSPLANT | Facility: CLINIC | Age: 66
End: 2021-11-10
Payer: COMMERCIAL

## 2021-11-10 NOTE — TELEPHONE ENCOUNTER
States feels like she has a bad sinus infection   Sore throat, headache, sinus pressure, some nausea, fatigue   No lingering fever. Have not checked her temperature for 2 days.    Weekly COVID-19 check-in / update:  Did Sx improve?  Did she get monoclonal antibodies?  Is she currently taking  mg BID?    OUTCOME:  Left VM re: current MMF dose?  How are her SX?  [employer] message sent as well.

## 2021-11-11 NOTE — TELEPHONE ENCOUNTER
Pt missed the message to change her CSA dose  She said her COVID symptoms are pretty well gone  - she is still on her original doses of CSA  Did not make the change

## 2021-11-19 PROBLEM — U07.1 INFECTION DUE TO 2019 NOVEL CORONAVIRUS: Status: ACTIVE | Noted: 2021-11-01

## 2022-01-08 DIAGNOSIS — I15.1 HYPERTENSION SECONDARY TO OTHER RENAL DISORDERS: ICD-10-CM

## 2022-01-08 DIAGNOSIS — Z94.0 KIDNEY TRANSPLANT RECIPIENT: ICD-10-CM

## 2022-01-08 DIAGNOSIS — Z94.0 KIDNEY REPLACED BY TRANSPLANT: ICD-10-CM

## 2022-01-08 DIAGNOSIS — I10 ESSENTIAL HYPERTENSION WITH GOAL BLOOD PRESSURE LESS THAN 130/80: ICD-10-CM

## 2022-01-08 DIAGNOSIS — Z79.60 LONG-TERM USE OF IMMUNOSUPPRESSANT MEDICATION: ICD-10-CM

## 2022-01-08 DIAGNOSIS — Z94.0 KIDNEY TRANSPLANTED: ICD-10-CM

## 2022-01-10 RX ORDER — AMLODIPINE BESYLATE 5 MG/1
5 TABLET ORAL AT BEDTIME
Qty: 90 TABLET | Refills: 3 | Status: SHIPPED | OUTPATIENT
Start: 2022-01-10 | End: 2023-01-04

## 2022-02-13 ENCOUNTER — HEALTH MAINTENANCE LETTER (OUTPATIENT)
Age: 67
End: 2022-02-13

## 2022-04-10 ENCOUNTER — HEALTH MAINTENANCE LETTER (OUTPATIENT)
Age: 67
End: 2022-04-10

## 2022-04-26 ENCOUNTER — TELEPHONE (OUTPATIENT)
Dept: FAMILY MEDICINE | Facility: CLINIC | Age: 67
End: 2022-04-26
Payer: COMMERCIAL

## 2022-04-26 DIAGNOSIS — Z12.11 SPECIAL SCREENING FOR MALIGNANT NEOPLASMS, COLON: ICD-10-CM

## 2022-04-26 DIAGNOSIS — Z12.31 ENCOUNTER FOR SCREENING MAMMOGRAM FOR BREAST CANCER: Primary | ICD-10-CM

## 2022-09-22 DIAGNOSIS — Z79.899 IMMUNOSUPPRESSIVE MANAGEMENT ENCOUNTER FOLLOWING KIDNEY TRANSPLANT: ICD-10-CM

## 2022-09-22 DIAGNOSIS — Z48.298 AFTERCARE FOLLOWING ORGAN TRANSPLANT: ICD-10-CM

## 2022-09-22 DIAGNOSIS — Z94.0 KIDNEY TRANSPLANTED: Primary | ICD-10-CM

## 2022-09-22 DIAGNOSIS — Z94.0 IMMUNOSUPPRESSIVE MANAGEMENT ENCOUNTER FOLLOWING KIDNEY TRANSPLANT: ICD-10-CM

## 2022-09-26 ENCOUNTER — LAB (OUTPATIENT)
Dept: LAB | Facility: CLINIC | Age: 67
End: 2022-09-26
Payer: COMMERCIAL

## 2022-09-26 DIAGNOSIS — Z79.899 IMMUNOSUPPRESSIVE MANAGEMENT ENCOUNTER FOLLOWING KIDNEY TRANSPLANT: ICD-10-CM

## 2022-09-26 DIAGNOSIS — Z48.298 AFTERCARE FOLLOWING ORGAN TRANSPLANT: ICD-10-CM

## 2022-09-26 DIAGNOSIS — Z94.0 IMMUNOSUPPRESSIVE MANAGEMENT ENCOUNTER FOLLOWING KIDNEY TRANSPLANT: ICD-10-CM

## 2022-09-26 DIAGNOSIS — Z94.0 KIDNEY TRANSPLANTED: ICD-10-CM

## 2022-09-26 LAB
ALBUMIN MFR UR ELPH: 11.6 MG/DL
ANION GAP SERPL CALCULATED.3IONS-SCNC: 6 MMOL/L (ref 7–15)
BUN SERPL-MCNC: 11.2 MG/DL (ref 8–23)
CALCIUM SERPL-MCNC: 9.3 MG/DL (ref 8.8–10.2)
CHLORIDE SERPL-SCNC: 106 MMOL/L (ref 98–107)
CREAT SERPL-MCNC: 0.7 MG/DL (ref 0.51–0.95)
CREAT UR-MCNC: 89.4 MG/DL
DEPRECATED HCO3 PLAS-SCNC: 29 MMOL/L (ref 22–29)
ERYTHROCYTE [DISTWIDTH] IN BLOOD BY AUTOMATED COUNT: 12.8 % (ref 10–15)
GFR SERPL CREATININE-BSD FRML MDRD: >90 ML/MIN/1.73M2
GLUCOSE SERPL-MCNC: 102 MG/DL (ref 70–99)
HCT VFR BLD AUTO: 39.3 % (ref 35–47)
HGB BLD-MCNC: 13.1 G/DL (ref 11.7–15.7)
MCH RBC QN AUTO: 28.6 PG (ref 26.5–33)
MCHC RBC AUTO-ENTMCNC: 33.3 G/DL (ref 31.5–36.5)
MCV RBC AUTO: 86 FL (ref 78–100)
PLATELET # BLD AUTO: 100 10E3/UL (ref 150–450)
POTASSIUM SERPL-SCNC: 4.3 MMOL/L (ref 3.4–5.3)
PROT/CREAT 24H UR: 0.13 MG/MG CR (ref 0–0.2)
RBC # BLD AUTO: 4.58 10E6/UL (ref 3.8–5.2)
SODIUM SERPL-SCNC: 141 MMOL/L (ref 136–145)
WBC # BLD AUTO: 3.8 10E3/UL (ref 4–11)

## 2022-09-26 PROCEDURE — 80048 BASIC METABOLIC PNL TOTAL CA: CPT

## 2022-09-26 PROCEDURE — 84156 ASSAY OF PROTEIN URINE: CPT

## 2022-09-26 PROCEDURE — 80197 ASSAY OF TACROLIMUS: CPT

## 2022-09-26 PROCEDURE — 36415 COLL VENOUS BLD VENIPUNCTURE: CPT

## 2022-09-26 PROCEDURE — 85027 COMPLETE CBC AUTOMATED: CPT

## 2022-09-27 LAB
TACROLIMUS BLD-MCNC: 4.5 UG/L (ref 5–15)
TME LAST DOSE: ABNORMAL H
TME LAST DOSE: ABNORMAL H

## 2022-09-28 ENCOUNTER — VIRTUAL VISIT (OUTPATIENT)
Dept: NEPHROLOGY | Facility: CLINIC | Age: 67
End: 2022-09-28
Attending: INTERNAL MEDICINE
Payer: COMMERCIAL

## 2022-09-28 DIAGNOSIS — Z48.298 AFTERCARE FOLLOWING ORGAN TRANSPLANT: ICD-10-CM

## 2022-09-28 DIAGNOSIS — Z94.0 KIDNEY TRANSPLANTED: Primary | ICD-10-CM

## 2022-09-28 PROCEDURE — 99214 OFFICE O/P EST MOD 30 MIN: CPT | Mod: 95 | Performed by: NURSE PRACTITIONER

## 2022-09-28 PROCEDURE — G0463 HOSPITAL OUTPT CLINIC VISIT: HCPCS | Mod: PN,RTG | Performed by: NURSE PRACTITIONER

## 2022-09-28 NOTE — LETTER
9/28/2022       RE: Maritza Navarro  [address] [facility] [address]     Dear Colleague,    Thank you for referring your patient, Maritza Navarro, to the [facility]PHROLOGY [facility]IC [place] at [facility]. Please see a copy of my visit note below.    Maritza is a 66 year old who is being evaluated via a billable video visit.      How would you like to obtain your AVS? MyChart  If the video visit is dropped, the invitation should be resent by: Text to cell phone: [phone]  Will anyone else be joining your video visit? No        Video-Visit Details    Video Start Time: 9:05 am     Type of service:  Video Visit    Video End Time:9:15 am     Originating Location (pt. Location): Home    Distant Location (provider location):  [facility] [place]     Platform used for Video Visit: [employer]     CHRONIC TRANSPLANT NEPHROLOGY VISIT    Assessment & Plan      # LDKT: Stable   - Baseline Cr ~ 0.7-0.9   - Proteinuria: Minimal (0.2-0.5 grams)   - Date DSA Last Checked: Sep/2020       Latest DSA: No   - BK Viremia: Not checked recently due to time from transplant    - Kidney Tx Biopsy: No    Advised her to continue with quarterly labs     # Immunosuppression: Tacrolimus immediate release (goal 4-6) and Mycophenolate mofetil (goal not followed)   - Changes: Not at this time  Tac trough 4.5 ( 9/26/22)     # Prophylaxis:   - PJP: None. Will check CD4 count.    # Hypertension: Controlled; Goal BP: < 130/80   - Changes: No, Continue amlodipine 5 mg daily    Home BP: 120/70     # Mineral Bone Disorder:   - Vitamin D; level: Normal , on  1000 Units daily   - Calcium; level: Normal    # Thrombocytopenia: Stable platelet count running ~ 's.    # Alopecia: Mostly stable. Felt likely at least partly due to tacrolimus, but patient isn't interested in changing immunosuppression.    # Skin Cancer Risk:    - Discussed sun protection and recommend  "regular follow up with Dermatology.              -      # Medical Compliance: No.  Evidence of labs less than recommended.  - Discussed importance of checking labs regularly as recommended, taking medications as prescribed and attending scheduled medical appointments.    # Shingles: Will talk to her PCP about getting the vaccination. She was advised she can take SHINGRIX      # COVID-19 Virus Review: Discussed COVID-19 virus and the potential medical risks.  Reviewed preventative health recommendations, which includes washing hands for at least  20 seconds, avoid touching your face, and social distancing.  Asked patient to inform the transplant center if they are exposed or diagnosed with this virus.  -  COVID19 Vaccine status :  Completed Pfizer April 2021   - due for next booster    # flu vaccine : She was advised to take flu vaccine but she declines due to past h/o falling sick with the vaccine       # Transplant History:  Etiology of kidney failure: Polycystic kidney disease (PKD)  Tx: LDKT  Transplant: 9/29/2015 (Kidney)  Donor Type: Living Donor Class:   Significant changes in immunosuppression: None  Significant transplant-related complications: None    Transplant Office Phone Number: [phone]    Assessment and plan was discussed with the patient and she voiced her understanding and agreement.    Return visit: one year.     YANN Lopez CNP     Chief Complaint   Ms. Navarro is a 66 year old here for routine follow up.    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 Since she was seen by Dr. Sanchez last year, she has not had any hospitalizations or ED visits.  She did test positive for COVID last October, c/b \"mild sinus infection\".  She is due for her next booster.      No SOB/CP  No dizziness/lightheadedness  No Leg swelling   No cough/fever/chills  Appetite is good. No unintentional weight loss   No Abd " pain/N/V/D/Constipation.   No voiding difficulty/hematuria /flank pain  No focal weakness/altered sensation.  No rash   Has hair loss   Mood is stable       Review of Systems   A comprehensive review of systems was obtained and negative, except as noted in the HPI or PMH.    Problem List   Patient Active Problem List   Diagnosis     Polycystic kidney disease     HTN, kidney transplant related     Vitamin D deficiency     Dyslipidemia     Polycystic liver disease     Kidney replaced by transplant     Immunosuppression (H)     Thrombocytopenia (H)     Steroid-induced hyperglycemia     Aftercare following organ transplant     Alopecia     Infection due to 2019 novel coronavirus       Social History   Social History     Tobacco Use     Smoking status: Never Smoker     Smokeless tobacco: Never Used   Substance Use Topics     Alcohol use: Yes     Alcohol/week: 0.8 standard drinks     Types: 1 Standard drinks or equivalent per week     Drug use: No       Allergies   Allergies   Allergen Reactions     Contrast Dye Unknown     Mold Unknown       Medications   Current Outpatient Medications   Medication Sig     amLODIPine (NORVASC) 5 MG tablet Take 1 tablet (5 mg) by mouth At Bedtime     mycophenolate (GENERIC EQUIVALENT) 250 MG capsule Take 3 capsules (750 mg) by mouth 2 times daily     tacrolimus (GENERIC EQUIVALENT) 0.5 MG capsule Take 1 capsule (0.5 mg) by mouth 2 times daily     tacrolimus (GENERIC EQUIVALENT) 1 MG capsule Take 1 capsule (1 mg) by mouth 2 times daily     Vitamin D, Cholecalciferol, 25 MCG (1000 UT) TABS Take 1 tablet (1,000 Units) by mouth daily     sulfamethoxazole-trimethoprim (BACTRIM) 400-80 MG tablet Take 1 tablet by mouth daily (Patient not taking: No sig reported)     No current facility-administered medications for this visit.     There are no discontinued medications.    Physical Exam   Vital Signs: There were no vitals taken for this visit.    No vitals were taken for this telemedicine  visit    Data     Renal Latest Ref Rng & Units 9/26/2022 9/10/2021 5/13/2021   Na 136 - 145 mmol/L 141 143 138   K 3.4 - 5.3 mmol/L 4.3 4.1 4.3   Cl 98 - 107 mmol/L 106 110(H) 105   CO2 22 - 29 mmol/L 29 30 27   BUN 8.0 - 23.0 mg/dL 11.2 11 15   Cr 0.51 - 0.95 mg/dL 0.70 0.69 0.70   Glucose 70 - 99 mg/dL 102(H) 98 96   Ca  8.8 - 10.2 mg/dL 9.3 9.0 8.8   Mg 1.6 - 2.3 mg/dL - - -     Bone Health Latest Ref Rng & Units 5/13/2021 2/19/2020 9/18/2017   Phos 2.5 - 4.5 mg/dL - - -   PTHi 12 - 72 pg/mL - - -   Vit D Def 20 - 75 ug/L 33 33 34     Heme Latest Ref Rng & Units 9/26/2022 9/10/2021 5/13/2021   WBC 4.0 - 11.0 10e3/uL 3.8(L) 4.4 3.9(L)   Hgb 11.7 - 15.7 g/dL 13.1 14.1 13.9   Plt 150 - 450 10e3/uL 100(L) 108(L) 98(L)   ABSOLUTE NEUTROPHIL 1.6 - 8.3 10e9/L - - -   ABSOLUTE LYMPHOCYTES 0.8 - 5.3 10e9/L - - -   ABSOLUTE MONOCYTES 0.0 - 1.3 10e9/L - - -   ABSOLUTE EOSINOPHILS 0.0 - 0.7 10e9/L - - -   ABSOLUTE BASOPHILS 0.0 - 0.2 10e9/L - - -   ABS IMMATURE GRANULOCYTES 0 - 0.4 10e9/L - - -     Liver Latest Ref Rng & Units 4/25/2016 9/28/2015 1/26/2015   AP 40 - 150 U/L 191(H) 342(H) 209(H)   TBili 0.2 - 1.3 mg/dL 0.5 0.3 0.3   DBili 0.0 - 0.2 mg/dL 0.2 - -   ALT 0 - 50 U/L 36 68(H) 31   AST 0 - 45 U/L 27 45 22   Tot Protein 6.8 - 8.8 g/dL 6.7(L) 6.9 7.0   Albumin 3.4 - 5.0 g/dL 3.5 3.6 3.5     Pancreas Latest Ref Rng & Units 9/30/2015   A1C 4.3 - 6.0 % 5.5     Iron studies Latest Ref Rng & Units 10/8/2015   Iron 35 - 180 ug/dL 69   Iron sat 15 - 46 % 28   Ferritin 8 - 252 ng/mL 865(H)     [facility] Txp Virology Latest Ref Rng & Units 5/31/2018 12/27/2017 9/2/2016   CVM DNA Quant - - - Plasma   CMV QUANT IU/ML CMVND [IU]/mL - - CMV DNA Not Detected   The M[employer] AmpliPrep/MADDISON TaqMan CMV Test is an FDA-approved in vitro nucleic   acid amplification test for the quantitation of cytomegalovirus DNA in human   plasma (EDTA plasma) using the [employer] AmpliPrep Instrument for automated viral   nucleic acid extraction and the [employer]  TaqMan Analyzer or M[employer] TaqMan for   automated Real Time amplification and detection of the viral nucleic acid   target.   Titer results are reported in International Units/mL (IU/mL using 1st WHO   International standard for Human Cytomegalovirus for Nucleic Acid Amplification   based assays. The conversion factor between CMV DNA copis/mL (as defined by the   Roche MADDISON TaqMan CMV test) and International Units is the CMV DNA   concentration in IU/mL x 1.1 copies/IU = CMV DNA in copies/mL.   This assay has received FDA approval for the testing of human plasma only. The   Infectious Disease Diagnostic Laboratory at the [facility], [place], has validated the pe  rformance characteristics of the Roche   CMV assay for plasma, bronchial alveolar lavage/wash and urine.     LOG IU/ML OF CMVQNT <2.1 [Log:IU]/mL - - Not Calculated   BK Spec - Plasma Plasma -   BK Res BKNEG:BK Virus DNA Not Detected copies/mL BK Virus DNA Not Detected BK Virus DNA Not Detected -   BK Log <2.7 Log copies/mL Not Calculated Not Calculated -   EBV CAPSID ANTIBODY IGG 0.0 - 0.8 AI - - -   EBV DNA COPIES/ML EBVNEG [Copies]/mL - - EBV DNA Not Detected   EBV DNA LOG OF COPIES <2.7 [Log:copies]/mL - - Not Calculated   The Real-Time quantitative EBV assay was developed and its performance   characteristics determined by the Infectious Diseases Diagnostic Laboratory at   the [facility] in [place], [place].  The   primers and probes are Analyte Specific Reagents (ASRs) manufactured  by   Qiagen.   ASRs are used in many laboratory tests necessary for standard medical care and   generally do not require U.S. Food and Drug Administration approval.  The FDA   has determined that such clearance or approval is not necessary.  This test is   used for clinical purposes.  It should not be regarded as investigational or   research.   This laboratory is certified under the Clinical Laboratory  Improvement   Amendments of 1988 (CLIA-88) as qualified to perform high complexity clinical   laboratory testing.   The quantitative range of this assay is 500-22,500,00 copies/mL (2.7-7.4 log   copies/mL).  A negative result does not rule out the presence of PCR inhibitors     in the patient specimen or EBV DNA nucleic acid in concentrations below the   level of detection of the assay.  Inhibition may also lead to underestimation   of viral quantitation.     Hep B Core NR - - -        Recent Labs   Lab Test 02/05/21  0900 05/13/21  0931 09/10/21  0927 09/26/22  0939   DOSTAC 2130 2/4/21 9PM 5/12/21 9/9/2021  --    TACROL 5.9 4.9* 4.9* 4.5*     Recent Labs   Lab Test 12/21/15  0909 12/28/15  0917 10/17/16  0935   DOSMPA LD 12/20/15 2100 LD 12/27/2015 2100 ld 10/16/16 2100   MPACID 2.66 4.66* 5.02*   MPAG 31.8 37.3 51.7           Again, thank you for allowing me to participate in the care of your patient.      Sincerely,    YANN Lopez CNP

## 2022-09-28 NOTE — PROGRESS NOTES
Maritza is a 66 year old who is being evaluated via a billable video visit.      How would you like to obtain your AVS? MyChart  If the video visit is dropped, the invitation should be resent by: Text to cell phone: [phone]  Will anyone else be joining your video visit? No        Video-Visit Details    Video Start Time: 9:05 am     Type of service:  Video Visit    Video End Time:9:15 am     Originating Location (pt. Location): Home    Distant Location (provider location):  [facility]OLOG[facility] [place]     Platform used for Video Visit: [email or web address]     CHRONIC TRANSPLANT NEPHROLOGY VISIT    Assessment & Plan      # LDKT: Stable   - Baseline Cr ~ 0.7-0.9   - Proteinuria: Minimal (0.2-0.5 grams)   - Date DSA Last Checked: Sep/2020       Latest DSA: No   - BK Viremia: Not checked recently due to time from transplant    - Kidney Tx Biopsy: No    Advised her to continue with quarterly labs     # Immunosuppression: Tacrolimus immediate release (goal 4-6) and Mycophenolate mofetil (goal not followed)   - Changes: Not at this time  Tac trough 4.5 ( 9/26/22)     # Prophylaxis:   - PJP: None. Will check CD4 count.    # Hypertension: Controlled; Goal BP: < 130/80   - Changes: No, Continue amlodipine 5 mg daily    Home BP: 120/70     # Mineral Bone Disorder:   - Vitamin D; level: Normal , on  1000 Units daily   - Calcium; level: Normal    # Thrombocytopenia: Stable platelet count running ~ 's.    # Alopecia: Mostly stable. Felt likely at least partly due to tacrolimus, but patient isn't interested in changing immunosuppression.    # Skin Cancer Risk:    - Discussed sun protection and recommend regular follow up with Dermatology.              -      # Medical Compliance: No.  Evidence of labs less than recommended.  - Discussed importance of checking labs regularly as recommended, taking medications as prescribed and attending scheduled medical appointments.    # Shingles: Will talk to her PCP about  "getting the vaccination. She was advised she can take SHINGRIX      # COVID-19 Virus Review: Discussed COVID-19 virus and the potential medical risks.  Reviewed preventative health recommendations, which includes washing hands for at least  20 seconds, avoid touching your face, and social distancing.  Asked patient to inform the transplant center if they are exposed or diagnosed with this virus.  -  COVID19 Vaccine status :  Completed Pfizer April 2021   - due for next booster    # flu vaccine : She was advised to take flu vaccine but she declines due to past h/o falling sick with the vaccine       # Transplant History:  Etiology of kidney failure: Polycystic kidney disease (PKD)  Tx: LDKT  Transplant: 9/29/2015 (Kidney)  Donor Type: Living Donor Class:   Significant changes in immunosuppression: None  Significant transplant-related complications: None    Transplant Office Phone Number: [phone]    Assessment and plan was discussed with the patient and she voiced her understanding and agreement.    Return visit: one year.     YANN Lopez CNP     Chief Complaint   Ms. Navarro is a 66 year old here for routine follow up.    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 Since she was seen by Dr. Sanchez last year, she has not had any hospitalizations or ED visits.  She did test positive for COVID last October, c/b \"mild sinus infection\".  She is due for her next booster.      No SOB/CP  No dizziness/lightheadedness  No Leg swelling   No cough/fever/chills  Appetite is good. No unintentional weight loss   No Abd pain/N/V/D/Constipation.   No voiding difficulty/hematuria /flank pain  No focal weakness/altered sensation.  No rash   Has hair loss   Mood is stable       Review of Systems   A comprehensive review of systems was obtained and negative, except as noted in the HPI or PMH.    Problem List   Patient Active Problem List "   Diagnosis     Polycystic kidney disease     HTN, kidney transplant related     Vitamin D deficiency     Dyslipidemia     Polycystic liver disease     Kidney replaced by transplant     Immunosuppression (H)     Thrombocytopenia (H)     Steroid-induced hyperglycemia     Aftercare following organ transplant     Alopecia     Infection due to 2019 novel coronavirus       Social History   Social History     Tobacco Use     Smoking status: Never Smoker     Smokeless tobacco: Never Used   Substance Use Topics     Alcohol use: Yes     Alcohol/week: 0.8 standard drinks     Types: 1 Standard drinks or equivalent per week     Drug use: No       Allergies   Allergies   Allergen Reactions     Contrast Dye Unknown     Mold Unknown       Medications   Current Outpatient Medications   Medication Sig     amLODIPine (NORVASC) 5 MG tablet Take 1 tablet (5 mg) by mouth At Bedtime     mycophenolate (GENERIC EQUIVALENT) 250 MG capsule Take 3 capsules (750 mg) by mouth 2 times daily     tacrolimus (GENERIC EQUIVALENT) 0.5 MG capsule Take 1 capsule (0.5 mg) by mouth 2 times daily     tacrolimus (GENERIC EQUIVALENT) 1 MG capsule Take 1 capsule (1 mg) by mouth 2 times daily     Vitamin D, Cholecalciferol, 25 MCG (1000 UT) TABS Take 1 tablet (1,000 Units) by mouth daily     sulfamethoxazole-trimethoprim (BACTRIM) 400-80 MG tablet Take 1 tablet by mouth daily (Patient not taking: No sig reported)     No current facility-administered medications for this visit.     There are no discontinued medications.    Physical Exam   Vital Signs: There were no vitals taken for this visit.    No vitals were taken for this telemedicine visit    Data     Renal Latest Ref Rng & Units 9/26/2022 9/10/2021 5/13/2021   Na 136 - 145 mmol/L 141 143 138   K 3.4 - 5.3 mmol/L 4.3 4.1 4.3   Cl 98 - 107 mmol/L 106 110(H) 105   CO2 22 - 29 mmol/L 29 30 27   BUN 8.0 - 23.0 mg/dL 11.2 11 15   Cr 0.51 - 0.95 mg/dL 0.70 0.69 0.70   Glucose 70 - 99 mg/dL 102(H) 98 96   Ca   8.8 - 10.2 mg/dL 9.3 9.0 8.8   Mg 1.6 - 2.3 mg/dL - - -     Bone Health Latest Ref Rng & Units 5/13/2021 2/19/2020 9/18/2017   Phos 2.5 - 4.5 mg/dL - - -   PTHi 12 - 72 pg/mL - - -   Vit D Def 20 - 75 ug/L 33 33 34     Heme Latest Ref Rng & Units 9/26/2022 9/10/2021 5/13/2021   WBC 4.0 - 11.0 10e3/uL 3.8(L) 4.4 3.9(L)   Hgb 11.7 - 15.7 g/dL 13.1 14.1 13.9   Plt 150 - 450 10e3/uL 100(L) 108(L) 98(L)   ABSOLUTE NEUTROPHIL 1.6 - 8.3 10e9/L - - -   ABSOLUTE LYMPHOCYTES 0.8 - 5.3 10e9/L - - -   ABSOLUTE MONOCYTES 0.0 - 1.3 10e9/L - - -   ABSOLUTE EOSINOPHILS 0.0 - 0.7 10e9/L - - -   ABSOLUTE BASOPHILS 0.0 - 0.2 10e9/L - - -   ABS IMMATURE GRANULOCYTES 0 - 0.4 10e9/L - - -     Liver Latest Ref Rng & Units 4/25/2016 9/28/2015 1/26/2015   AP 40 - 150 U/L 191(H) 342(H) 209(H)   TBili 0.2 - 1.3 mg/dL 0.5 0.3 0.3   DBili 0.0 - 0.2 mg/dL 0.2 - -   ALT 0 - 50 U/L 36 68(H) 31   AST 0 - 45 U/L 27 45 22   Tot Protein 6.8 - 8.8 g/dL 6.7(L) 6.9 7.0   Albumin 3.4 - 5.0 g/dL 3.5 3.6 3.5     Pancreas Latest Ref Rng & Units 9/30/2015   A1C 4.3 - 6.0 % 5.5     Iron studies Latest Ref Rng & Units 10/8/2015   Iron 35 - 180 ug/dL 69   Iron sat 15 - 46 % 28   Ferritin 8 - 252 ng/mL 865(H)     [facility] Txp Virology Latest Ref Rng & Units 5/31/2018 12/27/2017 9/2/2016   CVM DNA Quant - - - Plasma   CMV QUANT IU/ML CMVND [IU]/mL - - CMV DNA Not Detected   The M[employer]ON AmpliPrep/MADDISON TaqMan CMV Test is an FDA-approved in vitro nucleic   acid amplification test for the quantitation of cytomegalovirus DNA in human   plasma (EDTA plasma) using the [employer] AmpliPrep Instrument for automated viral   nucleic acid extraction and the [employer] TaqMan Analyzer or [employer] for   automated Real Time amplification and detection of the viral nucleic acid   target.   Titer results are reported in International Units/mL (IU/mL using 1st WHO   International standard for Human Cytomegalovirus for Nucleic Acid Amplification   based assays. The conversion factor between CMV  DNA copis/mL (as defined by the   Roche MADDISON TaqMan CMV test) and International Units is the CMV DNA   concentration in IU/mL x 1.1 copies/IU = CMV DNA in copies/mL.   This assay has received FDA approval for the testing of human plasma only. The   Infectious Disease Diagnostic Laboratory at the [facility], [place], has validated the pe  rformance characteristics of the Roche   CMV assay for plasma, bronchial alveolar lavage/wash and urine.     LOG IU/ML OF CMVQNT <2.1 [Log:IU]/mL - - Not Calculated   BK Spec - Plasma Plasma -   BK Res BKNEG:BK Virus DNA Not Detected copies/mL BK Virus DNA Not Detected BK Virus DNA Not Detected -   BK Log <2.7 Log copies/mL Not Calculated Not Calculated -   EBV CAPSID ANTIBODY IGG 0.0 - 0.8 AI - - -   EBV DNA COPIES/ML EBVNEG [Copies]/mL - - EBV DNA Not Detected   EBV DNA LOG OF COPIES <2.7 [Log:copies]/mL - - Not Calculated   The Real-Time quantitative EBV assay was developed and its performance   characteristics determined by the Infectious Diseases Diagnostic Laboratory at   the [facility] in [place], [place].  The   primers and probes are Analyte Specific Reagents (ASRs) manufactured  by   [employer].   ASRs are used in many laboratory tests necessary for standard medical care and   generally do not require U.S. Food and Drug Administration approval.  The FDA   has determined that such clearance or approval is not necessary.  This test is   used for clinical purposes.  It should not be regarded as investigational or   research.   This laboratory is certified under the Clinical Laboratory Improvement   Amendments of 1988 (CLIA-88) as qualified to perform high complexity clinical   laboratory testing.   The quantitative range of this assay is 500-22,500,00 copies/mL (2.7-7.4 log   copies/mL).  A negative result does not rule out the presence of PCR inhibitors     in the patient specimen or EBV DNA nucleic acid in  concentrations below the   level of detection of the assay.  Inhibition may also lead to underestimation   of viral quantitation.     Hep B Core NR - - -        Recent Labs   Lab Test 02/05/21  0900 05/13/21  0931 09/10/21  0927 09/26/22  0939   DOSTAC 2130 2/4/21 9PM 5/12/21 9/9/2021  --    TACROL 5.9 4.9* 4.9* 4.5*     Recent Labs   Lab Test 12/21/15  0909 12/28/15  0917 10/17/16  0935   DOSMPA LD 12/20/15 2100 LD 12/27/2015 2100 ld 10/16/16 2100   MPACID 2.66 4.66* 5.02*   MPAG 31.8 37.3 51.7

## 2022-10-07 DIAGNOSIS — Z79.60 LONG-TERM USE OF IMMUNOSUPPRESSANT MEDICATION: ICD-10-CM

## 2022-10-07 DIAGNOSIS — I15.1 HYPERTENSION SECONDARY TO OTHER RENAL DISORDERS: ICD-10-CM

## 2022-10-07 DIAGNOSIS — Z94.0 KIDNEY TRANSPLANTED: ICD-10-CM

## 2022-10-07 DIAGNOSIS — Z94.0 KIDNEY REPLACED BY TRANSPLANT: ICD-10-CM

## 2022-10-07 DIAGNOSIS — Z94.0 KIDNEY TRANSPLANT RECIPIENT: ICD-10-CM

## 2022-10-07 DIAGNOSIS — I10 ESSENTIAL HYPERTENSION WITH GOAL BLOOD PRESSURE LESS THAN 130/80: ICD-10-CM

## 2022-10-07 RX ORDER — TACROLIMUS 1 MG/1
CAPSULE ORAL
Qty: 180 CAPSULE | Refills: 3 | Status: SHIPPED | OUTPATIENT
Start: 2022-10-07 | End: 2023-02-03

## 2022-10-15 ENCOUNTER — HEALTH MAINTENANCE LETTER (OUTPATIENT)
Age: 67
End: 2022-10-15

## 2022-12-02 DIAGNOSIS — I15.1 HYPERTENSION SECONDARY TO OTHER RENAL DISORDERS: ICD-10-CM

## 2022-12-02 DIAGNOSIS — Z94.0 KIDNEY REPLACED BY TRANSPLANT: ICD-10-CM

## 2022-12-02 DIAGNOSIS — Z79.60 LONG-TERM USE OF IMMUNOSUPPRESSANT MEDICATION: ICD-10-CM

## 2022-12-02 DIAGNOSIS — Z94.0 KIDNEY TRANSPLANTED: ICD-10-CM

## 2022-12-02 DIAGNOSIS — I10 ESSENTIAL HYPERTENSION WITH GOAL BLOOD PRESSURE LESS THAN 130/80: ICD-10-CM

## 2022-12-02 DIAGNOSIS — Z94.0 KIDNEY TRANSPLANT RECIPIENT: ICD-10-CM

## 2022-12-02 RX ORDER — MYCOPHENOLATE MOFETIL 250 MG/1
CAPSULE ORAL
Qty: 540 CAPSULE | Refills: 3 | Status: SHIPPED | OUTPATIENT
Start: 2022-12-02 | End: 2023-02-03

## 2022-12-06 ENCOUNTER — TELEPHONE (OUTPATIENT)
Dept: TRANSPLANT | Facility: CLINIC | Age: 67
End: 2022-12-06

## 2022-12-06 NOTE — TELEPHONE ENCOUNTER
Call returned to patient. No answer. Voice message left instructing patient that pharmacy confirms receiving rx 12/2.

## 2022-12-06 NOTE — TELEPHONE ENCOUNTER
Voicemail  Date/Time: 12/5/22 9:54 am    Reason for call: Keri called to request someone check on her Mycophenolate refill. She reports she has reached out to her pharmacy several times and she still doesn't have med

## 2023-01-04 DIAGNOSIS — Z94.0 KIDNEY REPLACED BY TRANSPLANT: ICD-10-CM

## 2023-01-04 DIAGNOSIS — Z94.0 KIDNEY TRANSPLANT RECIPIENT: ICD-10-CM

## 2023-01-04 DIAGNOSIS — Z94.0 KIDNEY TRANSPLANTED: ICD-10-CM

## 2023-01-04 DIAGNOSIS — I10 ESSENTIAL HYPERTENSION WITH GOAL BLOOD PRESSURE LESS THAN 130/80: ICD-10-CM

## 2023-01-04 DIAGNOSIS — I15.1 HYPERTENSION SECONDARY TO OTHER RENAL DISORDERS: Primary | ICD-10-CM

## 2023-01-04 DIAGNOSIS — Z79.60 LONG-TERM USE OF IMMUNOSUPPRESSANT MEDICATION: ICD-10-CM

## 2023-01-04 RX ORDER — AMLODIPINE BESYLATE 5 MG/1
5 TABLET ORAL AT BEDTIME
Qty: 90 TABLET | Refills: 0 | Status: SHIPPED | OUTPATIENT
Start: 2023-01-04 | End: 2023-02-03

## 2023-02-03 ENCOUNTER — TELEPHONE (OUTPATIENT)
Dept: TRANSPLANT | Facility: CLINIC | Age: 68
End: 2023-02-03
Payer: COMMERCIAL

## 2023-02-03 DIAGNOSIS — Z79.60 LONG-TERM USE OF IMMUNOSUPPRESSANT MEDICATION: ICD-10-CM

## 2023-02-03 DIAGNOSIS — I15.1 HYPERTENSION SECONDARY TO OTHER RENAL DISORDERS: ICD-10-CM

## 2023-02-03 DIAGNOSIS — Z48.298 AFTERCARE FOLLOWING ORGAN TRANSPLANT: ICD-10-CM

## 2023-02-03 DIAGNOSIS — Z94.0 KIDNEY REPLACED BY TRANSPLANT: ICD-10-CM

## 2023-02-03 DIAGNOSIS — Z94.0 KIDNEY TRANSPLANTED: ICD-10-CM

## 2023-02-03 DIAGNOSIS — Z94.0 KIDNEY TRANSPLANT RECIPIENT: ICD-10-CM

## 2023-02-03 DIAGNOSIS — Z94.0 KIDNEY TRANSPLANTED: Primary | ICD-10-CM

## 2023-02-03 DIAGNOSIS — I10 ESSENTIAL HYPERTENSION WITH GOAL BLOOD PRESSURE LESS THAN 130/80: ICD-10-CM

## 2023-02-03 RX ORDER — MYCOPHENOLATE MOFETIL 250 MG/1
CAPSULE ORAL
Qty: 540 CAPSULE | Refills: 3 | Status: SHIPPED | OUTPATIENT
Start: 2023-02-03 | End: 2024-03-20

## 2023-02-03 RX ORDER — TACROLIMUS 1 MG/1
1 CAPSULE ORAL 2 TIMES DAILY
Qty: 180 CAPSULE | Refills: 3 | Status: SHIPPED | OUTPATIENT
Start: 2023-02-03 | End: 2024-02-12

## 2023-02-03 RX ORDER — AMLODIPINE BESYLATE 5 MG/1
5 TABLET ORAL AT BEDTIME
Qty: 30 TABLET | Refills: 1 | Status: SHIPPED | OUTPATIENT
Start: 2023-02-03 | End: 2023-07-13

## 2023-02-03 RX ORDER — TACROLIMUS 0.5 MG/1
0.5 CAPSULE ORAL 2 TIMES DAILY
Qty: 60 CAPSULE | Refills: 11 | Status: SHIPPED | OUTPATIENT
Start: 2023-02-03 | End: 2023-07-13

## 2023-02-03 NOTE — TELEPHONE ENCOUNTER
SOT Referral placed for Sept 2023.      1/4/23 - Mychart message that [facility] is looking to collaborate with Primary Care Provider for ordering and management of non-transplant specific medications.   --- message was not read.      OUTCOME:  Refills sent for 30 days + 1 [id number].  Letter mailed to residence

## 2023-02-03 NOTE — LETTER
February 3, 2023        Maritza Navarro  MRN: [id number]          Maritza,     We received your request to refill your Amlodipine. Please do be aware that [facility] is looking to collaborate with your Primary Care Provider for ordering and management of non-transplant specific medications.      I have sent a refill for a 30 day supply. Please do connect with your primary provider within that time to discuss ongoing management.     If you have any questions after talking with your Primary Provider about these medications, please do let us know. You may also ask a general Nephrologist to prescribe and manage if you are seeing one.       Thanks,    The Transplant Team

## 2023-02-03 NOTE — TELEPHONE ENCOUNTER
Patient Call: Medication Refill  Route to LPN  Instruct the patient to first contact their pharmacy. If they have called their pharmacy and require further assistance, route to LPN.    Pharmacy Name: [facility]  Pharmacy Location: [place]  Name of Medication: Amlodipine- Check to see if she has refills left on Mycophenolate and tacro  When she calls [facility] they tell her she is out of refills   When will the patient be out of this medication?: Less than 3 days (Route high priority)

## 2023-03-26 ENCOUNTER — HEALTH MAINTENANCE LETTER (OUTPATIENT)
Age: 68
End: 2023-03-26

## 2023-05-04 ENCOUNTER — TELEPHONE (OUTPATIENT)
Dept: FAMILY MEDICINE | Facility: CLINIC | Age: 68
End: 2023-05-04

## 2023-05-04 NOTE — TELEPHONE ENCOUNTER
Patient Quality Outreach    Patient is due for the following:   Breast Cancer Screening - Mammogram  Physical Annual Wellness Visit    Next Steps:   Schedule a Annual Wellness Visit    Type of outreach:    Sent [employer] message.    Next Steps:  Reach out within 90 days via Phone.    Max number of attempts reached: No. Will try again in 90 days if patient still on fail list.    Questions for provider review:    None           Emma Santana, [facility]  Chart routed to  .

## 2023-05-04 NOTE — LETTER
May 4, 2023      Maritza Navarro  [address] [facility] [address]        Dear Maritza,    In order to ensure we are providing the best quality care, Dr. Schwab and I have reviewed your chart and see that you are due for a yearly Physical and a Mammogram.     Please call the clinic to set up an office visit at [phone] or through My Chart at your earliest convenience.    Please call and set up an appointment at one of the [facility] facilities: [phone]    We greatly appreciate the opportunity to serve you. Thank you for trusting us with your health care.    Sincerely,    Betty Schwab M.D.  Emma TORRES, CMA

## 2023-06-16 ENCOUNTER — VIRTUAL VISIT (OUTPATIENT)
Dept: FAMILY MEDICINE | Facility: CLINIC | Age: 68
End: 2023-06-16
Payer: COMMERCIAL

## 2023-06-16 ENCOUNTER — LAB (OUTPATIENT)
Dept: LAB | Facility: CLINIC | Age: 68
End: 2023-06-16
Payer: COMMERCIAL

## 2023-06-16 DIAGNOSIS — D84.821 IMMUNODEFICIENCY DUE TO DRUGS (CODE) (H): ICD-10-CM

## 2023-06-16 DIAGNOSIS — R35.0 URINARY FREQUENCY: ICD-10-CM

## 2023-06-16 DIAGNOSIS — N30.01 ACUTE CYSTITIS WITH HEMATURIA: Primary | ICD-10-CM

## 2023-06-16 DIAGNOSIS — D69.6 THROMBOCYTOPENIA (H): ICD-10-CM

## 2023-06-16 LAB
ALBUMIN UR-MCNC: 30 MG/DL
APPEARANCE UR: ABNORMAL
BACTERIA #/AREA URNS HPF: ABNORMAL /HPF
BILIRUB UR QL STRIP: NEGATIVE
COLOR UR AUTO: YELLOW
GLUCOSE UR STRIP-MCNC: NEGATIVE MG/DL
HGB UR QL STRIP: ABNORMAL
KETONES UR STRIP-MCNC: NEGATIVE MG/DL
LEUKOCYTE ESTERASE UR QL STRIP: ABNORMAL
MUCOUS THREADS #/AREA URNS LPF: PRESENT /LPF
NITRATE UR QL: NEGATIVE
PH UR STRIP: 6.5 [PH] (ref 5–7)
RBC #/AREA URNS AUTO: ABNORMAL /HPF
SP GR UR STRIP: 1.02 (ref 1–1.03)
UROBILINOGEN UR STRIP-ACNC: 0.2 E.U./DL
WBC #/AREA URNS AUTO: ABNORMAL /HPF
WBC CLUMPS #/AREA URNS HPF: PRESENT /HPF

## 2023-06-16 PROCEDURE — 99213 OFFICE O/P EST LOW 20 MIN: CPT | Mod: 95 | Performed by: PHYSICIAN ASSISTANT

## 2023-06-16 PROCEDURE — 81001 URINALYSIS AUTO W/SCOPE: CPT

## 2023-06-16 PROCEDURE — 87086 URINE CULTURE/COLONY COUNT: CPT

## 2023-06-16 RX ORDER — NITROFURANTOIN 25; 75 MG/1; MG/1
100 CAPSULE ORAL 2 TIMES DAILY
Qty: 14 CAPSULE | Refills: 0 | Status: SHIPPED | OUTPATIENT
Start: 2023-06-16 | End: 2023-06-23

## 2023-06-16 NOTE — PROGRESS NOTES
Maritza is a 67 year old who is being evaluated via a billable telephone visit.      What phone number would you like to be contacted at? [phone]  How would you like to obtain your AVS? 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              HPI     Genitourinary - Female  Onset/Duration: 2-3 weeks  Description:   Painful urination (Dysuria): No, but she does get a weird sensation after emptying her bladder           Frequency: YES  Blood in urine (Hematuria): No  Delay in urine (Hesitency): No  Intensity: moderate  Progression of Symptoms:  same  Accompanying Signs & Symptoms:  Fever/chills: No  Flank pain: No  Nausea and vomiting: No  Vaginal symptoms: none  Abdominal/Pelvic Pain: No  History:   History of frequent UTI s: No  History of kidney stones: No  Sexually Active: No  Possibility of pregnancy: No  Precipitating or alleviating factors: None  Therapies tried and outcome:  None     Ongoing for the last 2-3 weeks  Not getting worse but also not getting better  No pain or burning  No blood  Does feel like she is going more often and after she goes will have a strange sensation (almost an 'ache') down into her hands      Review of Systems   Remainder of ROS obtained and  found to be negative other than that which was documented above        Objective           Vitals:  No vitals were obtained today due to virtual visit.    Physical Exam   healthy, alert and no distress  PSYCH: Alert and oriented times 3; coherent speech, normal   rate and volume, able to articulate logical thoughts, able   to abstract reason, no tangential thoughts, no hallucinations   or delusions  Her affect is normal  RESP: No cough, no audible wheezing, able to talk in full sentences  Remainder of exam unable to be completed due to telephone visits    Diagnostic Tests:   UA RESULTS:  Recent Labs   Lab Test 06/16/23  1001   COLOR Yellow   APPEARANCE Cloudy*   URINEGLC Negative   URINEBILI Negative   URINEKETONE Negative   SG 1.025   UBLD Small*   URINEPH 6.5   PROTEIN 30*   UROBILINOGEN 0.2   NITRITE Negative   LEUKEST Large*   RBCU 10-25*   WBCU *         Phone call duration: 21 minutes

## 2023-06-18 LAB — BACTERIA UR CULT: NO GROWTH

## 2023-07-13 ENCOUNTER — LAB (OUTPATIENT)
Dept: LAB | Facility: CLINIC | Age: 68
End: 2023-07-13
Payer: COMMERCIAL

## 2023-07-13 ENCOUNTER — OFFICE VISIT (OUTPATIENT)
Dept: FAMILY MEDICINE | Facility: CLINIC | Age: 68
End: 2023-07-13
Payer: COMMERCIAL

## 2023-07-13 VITALS
HEIGHT: 60 IN | HEART RATE: 88 BPM | RESPIRATION RATE: 20 BRPM | SYSTOLIC BLOOD PRESSURE: 142 MMHG | DIASTOLIC BLOOD PRESSURE: 86 MMHG | BODY MASS INDEX: 23.89 KG/M2 | TEMPERATURE: 98 F | OXYGEN SATURATION: 97 % | WEIGHT: 121.7 LBS

## 2023-07-13 DIAGNOSIS — I15.1 HYPERTENSION SECONDARY TO OTHER RENAL DISORDERS: ICD-10-CM

## 2023-07-13 DIAGNOSIS — Z48.298 AFTERCARE FOLLOWING ORGAN TRANSPLANT: ICD-10-CM

## 2023-07-13 DIAGNOSIS — Z12.31 VISIT FOR SCREENING MAMMOGRAM: ICD-10-CM

## 2023-07-13 DIAGNOSIS — Z94.0 IMMUNOSUPPRESSIVE MANAGEMENT ENCOUNTER FOLLOWING KIDNEY TRANSPLANT: ICD-10-CM

## 2023-07-13 DIAGNOSIS — Z78.0 POSTMENOPAUSAL STATUS: ICD-10-CM

## 2023-07-13 DIAGNOSIS — Z79.899 IMMUNOSUPPRESSIVE MANAGEMENT ENCOUNTER FOLLOWING KIDNEY TRANSPLANT: ICD-10-CM

## 2023-07-13 DIAGNOSIS — D84.9 IMMUNOSUPPRESSION (H): ICD-10-CM

## 2023-07-13 DIAGNOSIS — Z94.0 KIDNEY TRANSPLANTED: ICD-10-CM

## 2023-07-13 DIAGNOSIS — Z94.0 KIDNEY REPLACED BY TRANSPLANT: ICD-10-CM

## 2023-07-13 DIAGNOSIS — Z79.60 LONG-TERM USE OF IMMUNOSUPPRESSANT MEDICATION: ICD-10-CM

## 2023-07-13 DIAGNOSIS — E55.9 VITAMIN D DEFICIENCY: Primary | ICD-10-CM

## 2023-07-13 LAB
ALBUMIN MFR UR ELPH: 12.5 MG/DL
ANION GAP SERPL CALCULATED.3IONS-SCNC: 8 MMOL/L (ref 7–15)
BUN SERPL-MCNC: 15.1 MG/DL (ref 8–23)
CALCIUM SERPL-MCNC: 9.8 MG/DL (ref 8.8–10.2)
CHLORIDE SERPL-SCNC: 103 MMOL/L (ref 98–107)
CREAT SERPL-MCNC: 0.76 MG/DL (ref 0.51–0.95)
CREAT UR-MCNC: 100.7 MG/DL
DEPRECATED HCO3 PLAS-SCNC: 28 MMOL/L (ref 22–29)
ERYTHROCYTE [DISTWIDTH] IN BLOOD BY AUTOMATED COUNT: 12.8 % (ref 10–15)
GFR SERPL CREATININE-BSD FRML MDRD: 85 ML/MIN/1.73M2
GLUCOSE SERPL-MCNC: 109 MG/DL (ref 70–99)
HCT VFR BLD AUTO: 37.8 % (ref 35–47)
HGB BLD-MCNC: 12.7 G/DL (ref 11.7–15.7)
MCH RBC QN AUTO: 28.6 PG (ref 26.5–33)
MCHC RBC AUTO-ENTMCNC: 33.6 G/DL (ref 31.5–36.5)
MCV RBC AUTO: 85 FL (ref 78–100)
PLATELET # BLD AUTO: 109 10E3/UL (ref 150–450)
POTASSIUM SERPL-SCNC: 4.1 MMOL/L (ref 3.4–5.3)
PROT/CREAT 24H UR: 0.12 MG/MG CR (ref 0–0.2)
RBC # BLD AUTO: 4.44 10E6/UL (ref 3.8–5.2)
SODIUM SERPL-SCNC: 139 MMOL/L (ref 136–145)
WBC # BLD AUTO: 5.2 10E3/UL (ref 4–11)

## 2023-07-13 PROCEDURE — 36415 COLL VENOUS BLD VENIPUNCTURE: CPT

## 2023-07-13 PROCEDURE — 99213 OFFICE O/P EST LOW 20 MIN: CPT | Performed by: FAMILY MEDICINE

## 2023-07-13 PROCEDURE — 84156 ASSAY OF PROTEIN URINE: CPT

## 2023-07-13 PROCEDURE — 85027 COMPLETE CBC AUTOMATED: CPT

## 2023-07-13 PROCEDURE — 80197 ASSAY OF TACROLIMUS: CPT

## 2023-07-13 PROCEDURE — 80048 BASIC METABOLIC PNL TOTAL CA: CPT

## 2023-07-13 RX ORDER — AMLODIPINE BESYLATE 5 MG/1
5 TABLET ORAL AT BEDTIME
Qty: 90 TABLET | Refills: 3 | Status: SHIPPED | OUTPATIENT
Start: 2023-07-13 | End: 2024-07-10

## 2023-07-13 ASSESSMENT — PAIN SCALES - GENERAL: PAINLEVEL: NO PAIN (0)

## 2023-07-13 NOTE — PROGRESS NOTES
Assessment & Plan     Vitamin D deficiency  Check dexa  - DEXA HIP/PELVIS/SPINE - Future; Future    Visit for screening mammogram  Due   - MA SCREENING DIGITAL BILAT - Future  (s+30); Future    Hypertension secondary to other renal disorders  Has been out of her medication for a few weeks she will check blood pressure at home   - amLODIPine (NORVASC) 5 MG tablet; Take 1 tablet (5 mg) by mouth At Bedtime    Kidney replaced by transplant  Doing well   - amLODIPine (NORVASC) 5 MG tablet; Take 1 tablet (5 mg) by mouth At Bedtime    Long-term use of immunosuppressant medication  Discussed zoster vaccine. I would like her to make sure that she can receive this   - amLODIPine (NORVASC) 5 MG tablet; Take 1 tablet (5 mg) by mouth At Bedtime        Postmenopausal status    - DEXA HIP/PELVIS/SPINE - Future; Future    Immunosuppression (H)    - Adult Dermatology Referral; Future         See Patient Instructions    Betty Schwab MD  [facility] RIK Salas is a 67 year old, presenting for the following health issues:  Hypertension and Physical      7/13/2023     1:20 PM   Additional Questions   Roomed by dominique grant cma     Healthy Habits:     Taking medications regularly:  0  History of Present Illness       Hypertension: She presents for follow up of hypertension.  She does not check blood pressure  regularly outside of the clinic. Outpatient blood pressures have not been over 140/90. She follows a low salt diet.     She eats 2-3 servings of fruits and vegetables daily.She consumes 1 sweetened beverage(s) daily.She exercises with enough effort to increase her heart rate 9 or less minutes per day.  She exercises with enough effort to increase her heart rate 3 or less days per week.   She is taking medications regularly.             Mental Health:    In general, how would you rate your overall mental or emotional health? good    PHQ-2 Score: 0        Fall risk:  Fallen 2 or more times in the past  "year?: No  Any fall with injury in the past year?: No    Cognitive Screenin) Repeat 3 items (Leader, Season, Table)    2) Clock draw: NORMAL  3) 3 item recall: Recalls 2 objects  Betty Schwab M.D.    Results: NORMAL clock, 1-2 items recalled: COGNITIVE IMPAIRMENT LESS LIKELY    Mini-CogTM Copyright JESSICA Fry. Licensed by the author for use in [facility]; reprinted with permission (prudence@[facility]). All rights reserved.      Do you have sleep apnea, excessive snoring or daytime drowsiness? : no    Social History     Tobacco Use     Smoking status: Never     Smokeless tobacco: Never   Substance Use Topics     Alcohol use: Yes     Alcohol/week: 0.8 standard drinks of alcohol     Types: 1 Standard drinks or equivalent per week                 Review of Systems   Constitutional, HEENT, cardiovascular, pulmonary, gi and gu systems are negative, except as otherwise noted.      Objective    BP (!) 142/86   Pulse 88   Temp 98  F (36.7  C) (Tympanic)   Resp 20   Ht 1.53 m (5' 0.24\")   Wt 55.2 kg (121 lb 11.2 oz)   SpO2 97%   BMI 23.58 kg/m    Body mass index is 23.58 kg/m .  Physical Exam   GENERAL: healthy, alert and no distress  CV: regular rate and rhythm, normal S1 S2, no S3 or S4, no murmur, click or rub, no peripheral edema and peripheral pulses strong    No results found for this or any previous visit (from the past 24 hour(s)).                  "

## 2023-07-13 NOTE — NURSING NOTE
"Chief Complaint   Patient presents with    Hypertension    Physical       Initial There were no vitals taken for this visit. Estimated body mass index is 25.76 kg/m  as calculated from the following:    Height as of 5/13/21: 1.53 m (5' 0.25\").    Weight as of 9/13/21: 60.3 kg (133 lb).    Patient presents to the clinic using No DME    Is there anyone who you would like to be able to receive your results? No  If yes have patient fill out CHRISTOPHE      "

## 2023-07-14 LAB
TACROLIMUS BLD-MCNC: 5.1 UG/L (ref 5–15)
TME LAST DOSE: NORMAL H
TME LAST DOSE: NORMAL H

## 2023-10-05 ENCOUNTER — VIRTUAL VISIT (OUTPATIENT)
Dept: TRANSPLANT | Facility: CLINIC | Age: 68
End: 2023-10-05
Attending: INTERNAL MEDICINE
Payer: COMMERCIAL

## 2023-10-05 VITALS — WEIGHT: 122 LBS | BODY MASS INDEX: 23.64 KG/M2

## 2023-10-05 DIAGNOSIS — D69.6 THROMBOCYTOPENIA (H): ICD-10-CM

## 2023-10-05 DIAGNOSIS — Z94.0 KIDNEY TRANSPLANTED: ICD-10-CM

## 2023-10-05 DIAGNOSIS — D84.9 IMMUNOSUPPRESSION (H): ICD-10-CM

## 2023-10-05 DIAGNOSIS — I15.1 HTN, KIDNEY TRANSPLANT RELATED: ICD-10-CM

## 2023-10-05 DIAGNOSIS — Z94.0 HTN, KIDNEY TRANSPLANT RELATED: ICD-10-CM

## 2023-10-05 DIAGNOSIS — Q61.3 POLYCYSTIC KIDNEY DISEASE: ICD-10-CM

## 2023-10-05 DIAGNOSIS — Q44.6 POLYCYSTIC LIVER DISEASE: ICD-10-CM

## 2023-10-05 DIAGNOSIS — E55.9 VITAMIN D DEFICIENCY: Primary | ICD-10-CM

## 2023-10-05 DIAGNOSIS — Z48.298 AFTERCARE FOLLOWING ORGAN TRANSPLANT: ICD-10-CM

## 2023-10-05 PROCEDURE — 99214 OFFICE O/P EST MOD 30 MIN: CPT | Mod: VID | Performed by: INTERNAL MEDICINE

## 2023-10-05 ASSESSMENT — PAIN SCALES - GENERAL: PAINLEVEL: NO PAIN (0)

## 2023-10-05 NOTE — PROGRESS NOTES
Virtual Visit Details    Type of service:  Video Visit   Video Start Time: 11:24 AM  Video End Time:11:40 AM    Originating Location (pt. Location): Home    Distant Location (provider location):  On-site  Platform used for Video Visit: Bruce     CHRONIC TRANSPLANT NEPHROLOGY VISIT    Assessment & Plan      # LDKT: Stable based on July 2023 labs, repeat lab work pending.   - Baseline Cr ~ 0.7-0.9   - Proteinuria: Minimal (0.2-0.5 grams)   - Date DSA Last Checked: Sep/2020       Latest DSA: No   - BK Viremia: Not checked recently due to time from transplant    - Kidney Tx Biopsy: No    Advised her to continue with quarterly labs     # Immunosuppression: Tacrolimus immediate release (goal 4-6) and Mycophenolate mofetil (goal not followed)   - Changes: Not at this time  Tac trough 4.5 ( 9/26/22)     # Prophylaxis:   - PJP: None. Will check CD4 count.    # Hypertension: Borderline control; Goal BP: < 130/80, 140's systolic in doctors office, currently not checking at home, encouraged to check BP at home and get back to us   - Changes: No, Continue amlodipine 5 mg daily    # Mineral Bone Disorder:   - Vitamin D; level: Normal , on  1000 Units daily   - Calcium; level: Normal    # Thrombocytopenia: Stable platelet count running ~ 's.    # Alopecia: Mostly stable. Felt likely at least partly due to tacrolimus, but patient isn't interested in changing immunosuppression.    # Skin Cancer Risk:  Did not had any dermatology visit so far, encouraged to make one in next coming months.   - Discussed sun protection and recommend regular follow up with Dermatology.    # Medical Compliance: No.  Evidence of labs less than recommended.  - Discussed importance of checking labs regularly as recommended, taking medications as prescribed and attending scheduled medical appointments.    # Shingles: Will talk to her PCP about getting the vaccination. She was advised she can take SHINGRIX    # COVID vaccine : due to booster     #  Transplant History:  Etiology of kidney failure: Polycystic kidney disease (PKD)  Tx: LDKT  Transplant: 9/29/2015 (Kidney)  Donor Type: Living Donor Class:   Significant changes in immunosuppression: None  Significant transplant-related complications: None    Transplant Office Phone Number: [phone]    Assessment and plan was discussed with the patient and she voiced her understanding and agreement.    Return visit: one year.     Total time spent on the day of clinic visit was 30 min including 12 min of face to face time, labs and image review, previous nephrology note review, ordering labs and documentation as above.    Melisa Elizabeth MD      Chief Complaint   Ms. 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 Since she was seen nephrology team last year, she has not had any hospitalizations or ED visits.  had COVID infection in 2021, She is due for her next booster.      No SOB/CP  No dizziness/lightheadedness  No Leg swelling   No cough/fever/chills  Appetite is good. No unintentional weight loss   No Abd pain/N/V/D/Constipation.   No voiding difficulty/hematuria /flank pain  No focal weakness/altered sensation.  No rash   Has hair loss   Mood is stable       Review of Systems   A comprehensive review of systems was obtained and negative, except as noted in the HPI or PMH.    Problem List   Patient Active Problem List   Diagnosis     Polycystic kidney disease     HTN, kidney transplant related     Vitamin D deficiency     Dyslipidemia     Polycystic liver disease     Kidney replaced by transplant     Immunosuppression (H24)     Thrombocytopenia (H24)     Steroid-induced hyperglycemia     Aftercare following organ transplant     Alopecia     Infection due to 2019 novel coronavirus       Social History   Social History     Tobacco Use     Smoking  status: Never     Smokeless tobacco: Never   Vaping Use     Vaping Use: Never used   Substance Use Topics     Alcohol use: Yes     Alcohol/week: 0.8 standard drinks of alcohol     Types: 1 Standard drinks or equivalent per week     Drug use: No       Allergies   Allergies   Allergen Reactions     Contrast Dye Unknown     Mold Unknown       Medications   Current Outpatient Medications   Medication Sig     amLODIPine (NORVASC) 5 MG tablet Take 1 tablet (5 mg) by mouth At Bedtime     mycophenolate (GENERIC EQUIVALENT) 250 MG capsule TAKE 3 CAPSULES BY MOUTH TWICE DAILY     tacrolimus (GENERIC EQUIVALENT) 1 MG capsule Take 1 capsule (1 mg) by mouth 2 times daily     Vitamin D, Cholecalciferol, 25 MCG (1000 UT) TABS Take 1 tablet (1,000 Units) by mouth daily     No current facility-administered medications for this visit.     There are no discontinued medications.    Physical Exam   Vital Signs: Wt 55.3 kg (122 lb)   BMI 23.64 kg/m      No vitals were taken for this telemedicine visit    General : alert, not in acute distress   HENT : non traumatic  EYES: non icteric   Lungs : able to speak in full sentences, no audible wheezing or no accessory muscle usage  Cardiac : denied any edema   Neuro : mentation and speech intact  Psych : bright.    Data         Latest Ref Rng & Units 7/13/2023     1:10 PM 9/26/2022     9:39 AM 9/10/2021     9:27 AM   Renal   Sodium 136 - 145 mmol/L 139  141  143    K 3.4 - 5.3 mmol/L 4.1  4.3  4.1    Cl 98 - 107 mmol/L 103  106  110    Cl (external) 98 - 107 mmol/L 103  106  110    CO2 22 - 29 mmol/L 28  29  30    Urea Nitrogen 7 - 30 mg/dL   11    Urea Nitrogen 8.0 - 23.0 mg/dL 15.1  11.2     Creatinine 0.51 - 0.95 mg/dL 0.76  0.70  0.69    Glucose 70 - 99 mg/dL 109  102  98    Calcium 8.8 - 10.2 mg/dL 9.8  9.3  9.0          Latest Ref Rng & Units 5/13/2021     9:31 AM 2/19/2020     9:52 AM 9/18/2017     9:19 AM   Bone Health   Vit D Def 20 - 75 ug/L 33  33  34          Latest Ref Rng & Units  7/13/2023     1:10 PM 9/26/2022     9:39 AM 9/10/2021     9:27 AM   Heme   WBC 4.0 - 11.0 10e3/uL 5.2  3.8  4.4    Hgb 11.7 - 15.7 g/dL 12.7  13.1  14.1    Plt 150 - 450 10e3/uL 109  100  108          Latest Ref Rng & Units 4/25/2016     9:08 AM 9/28/2015     7:40 AM 1/26/2015     7:08 AM   Liver   AP 40 - 150 U/L 191  342  209    TBili 0.2 - 1.3 mg/dL 0.5  0.3  0.3    Bilirubin Direct 0.0 - 0.2 mg/dL 0.2      ALT 0 - 50 U/L 36  68  31    AST 0 - 45 U/L 27  45  22    Tot Protein 6.8 - 8.8 g/dL 6.7  6.9  7.0    Albumin 3.4 - 5.0 g/dL 3.5  3.6  3.5          Latest Ref Rng & Units 9/30/2015     6:35 AM   Pancreas   A1C 4.3 - 6.0 % 5.5          Latest Ref Rng & Units 10/8/2015     7:09 AM   Iron studies   Iron 35 - 180 ug/dL 69    Iron Saturation Index 15 - 46 % 28    Ferritin 8 - 252 ng/mL 865          Latest Ref Rng & Units 5/31/2018     9:09 AM 12/27/2017     9:10 AM 9/2/2016     9:02 AM   [facility] Virology   CVM DNA Quant    Plasma    CMV QUANT IU/ML CMVND [IU]/mL   CMV DNA Not Detected   The MADDISON AmpliPrep/MADDISON TaqMan CMV Test is an FDA-approved in vitro nucleic   acid amplification test for the quantitation of cytomegalovirus DNA in human   plasma (EDTA plasma) using the M[employer] AmpliPrep Instrument for automated viral   nucleic acid extraction and the M[employer]ON TaqMan Analyzer or [employer] TaqMan for   automated Real Time amplification and detection of the viral nucleic acid   target.   Titer results are reported in International Units/mL (IU/mL using 1st WHO   International standard for Human Cytomegalovirus for Nucleic Acid Amplification   based assays. The conversion factor between CMV DNA copis/mL (as defined by the   Roche MADDISON TaqMan CMV test) and International Units is the CMV DNA   concentration in IU/mL x 1.1 copies/IU = CMV DNA in copies/mL.   This assay has received FDA approval for the testing of human plasma only. The   [facility] at the [facility]  [place], has validated the performance characteristics of the Roche   CMV assay for plasma, bronchial alveolar lavage/wash and urine.      LOG IU/ML OF CMVQNT <2.1 [Log_IU]/mL   Not Calculated    BK Spec  Plasma  Plasma     BK Res BKNEG^BK Virus DNA Not Detected copies/mL BK Virus DNA Not Detected  BK Virus DNA Not Detected     BK Log <2.7 Log copies/mL Not Calculated  Not Calculated     EBV DNA COPIES/ML EBVNEG [Copies]/mL   EBV DNA Not Detected    EBV DNA LOG OF COPIES <2.7 [Log_copies]/mL   Not Calculated   The Real-Time quantitative EBV assay was developed and its performance   characteristics determined by the [facility] Laboratory at   the [facility] in [place], [place].  The   primers and probes are Analyte Specific Reagents (ASRs) manufactured  by   [employer].   ASRs are used in many laboratory tests necessary for standard medical care and   generally do not require U.S. Food and Drug Administration approval.  The FDA   has determined that such clearance or approval is not necessary.  This test is   used for clinical purposes.  It should not be regarded as investigational or   research.   This laboratory is certified under the Clinical Laboratory Improvement   Amendments of 1988 (CLIA-88) as qualified to perform high complexity clinical   laboratory testing.   The quantitative range of this assay is 500-22,500,00 copies/mL (2.7-7.4 log   copies/mL).  A negative result does not rule out the presence of PCR inhibitors   in the patient specimen or EBV DNA nucleic acid in concentrations below the   level of detection of the assay.  Inhibition may also lead to underestimation   of viral quantitation.           Recent Labs   Lab Test 05/13/21  0931 09/10/21  0927 09/26/22  0939 07/13/23  1310   DOSTAC 9PM 5/12/21 9/9/2021  --  7/13/2023   TACROL 4.9* 4.9* 4.5* 5.1     Recent Labs   Lab Test 12/21/15  0909 12/28/15  0917 10/17/16  0935   DOSMPA LD 12/20/15 2100 LD  12/27/2015 2100 ld 10/16/16 2100   MPACID 2.66 4.66* 5.02*   MPAG 31.8 37.3 51.7

## 2023-10-05 NOTE — NURSING NOTE
Is the patient currently in the state of [place]? YES    Visit mode:VIDEO    If the visit is dropped, the patient can be reconnected by: VIDEO VISIT: Send to e-mail at: [email or web address]    Will anyone else be joining the visit? NO  (If patient encounters technical issues they should call [phone])    How would you like to obtain your AVS? MyChart    Are changes needed to the allergy or medication list? No    Reason for visit: SAMANTHA OSULLIVAN    
(4) no impairment

## 2023-10-05 NOTE — LETTER
10/5/2023         RE: Maritza Navarro  [address] [place] [address]        Dear Colleague,    Thank you for referring your patient, Maritza Navarro, to the [facility]. Please see a copy of my visit note below.      CHRONIC TRANSPLANT NEPHROLOGY VISIT    Assessment & Plan      # LDKT: Stable based on July 2023 labs, repeat lab work pending.   - Baseline Cr ~ 0.7-0.9   - Proteinuria: Minimal (0.2-0.5 grams)   - Date DSA Last Checked: Sep/2020       Latest DSA: No   - BK Viremia: Not checked recently due to time from transplant    - Kidney Tx Biopsy: No    Advised her to continue with quarterly labs     # Immunosuppression: Tacrolimus immediate release (goal 4-6) and Mycophenolate mofetil (goal not followed)   - Changes: Not at this time  Tac trough 4.5 ( 9/26/22)     # Prophylaxis:   - PJP: None. Will check CD4 count.    # Hypertension: Borderline control; Goal BP: < 130/80, 140's systolic in doctors office, currently not checking at home, encouraged to check BP at home and get back to us   - Changes: No, Continue amlodipine 5 mg daily    # Mineral Bone Disorder:   - Vitamin D; level: Normal , on  1000 Units daily   - Calcium; level: Normal    # Thrombocytopenia: Stable platelet count running ~ 's.    # Alopecia: Mostly stable. Felt likely at least partly due to tacrolimus, but patient isn't interested in changing immunosuppression.    # Skin Cancer Risk:  Did not had any dermatology visit so far, encouraged to make one in next coming months.   - Discussed sun protection and recommend regular follow up with Dermatology.    # Medical Compliance: No.  Evidence of labs less than recommended.  - Discussed importance of checking labs regularly as recommended, taking medications as prescribed and attending scheduled medical appointments.    # Shingles: Will talk to her PCP about getting the vaccination. She was advised she can take SHINGRIX    # COVID vaccine : due to booster      # Transplant History:  Etiology of kidney failure: Polycystic kidney disease (PKD)  Tx: LDKT  Transplant: 9/29/2015 (Kidney)  Donor Type: Living Donor Class:   Significant changes in immunosuppression: None  Significant transplant-related complications: None    Transplant Office Phone Number: [phone]    Assessment and plan was discussed with the patient and she voiced her understanding and agreement.    Return visit: one year.     Total time spent on the day of clinic visit was 30 min including 12 min of face to face time, labs and image review, previous nephrology note review, ordering labs and documentation as above.    Melisa Elizabeth MD      Chief Complaint   Ms. 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 Since she was seen nephrology team last year, she has not had any hospitalizations or ED visits.  had COVID infection in 2021, She is due for her next booster.      No SOB/CP  No dizziness/lightheadedness  No Leg swelling   No cough/fever/chills  Appetite is good. No unintentional weight loss   No Abd pain/N/V/D/Constipation.   No voiding difficulty/hematuria /flank pain  No focal weakness/altered sensation.  No rash   Has hair loss   Mood is stable       Review of Systems   A comprehensive review of systems was obtained and negative, except as noted in the HPI or PMH.    Problem List   Patient Active Problem List   Diagnosis    Polycystic kidney disease    HTN, kidney transplant related    Vitamin D deficiency    Dyslipidemia    Polycystic liver disease    Kidney replaced by transplant    Immunosuppression (H24)    Thrombocytopenia (H24)    Steroid-induced hyperglycemia    Aftercare following organ transplant    Alopecia    Infection due to 2019 novel coronavirus       Social History   Social History     Tobacco Use    Smoking status:  Never    Smokeless tobacco: Never   Vaping Use    Vaping Use: Never used   Substance Use Topics    Alcohol use: Yes     Alcohol/week: 0.8 standard drinks of alcohol     Types: 1 Standard drinks or equivalent per week    Drug use: No       Allergies   Allergies   Allergen Reactions    Contrast Dye Unknown    Mold Unknown       Medications   Current Outpatient Medications   Medication Sig    amLODIPine (NORVASC) 5 MG tablet Take 1 tablet (5 mg) by mouth At Bedtime    mycophenolate (GENERIC EQUIVALENT) 250 MG capsule TAKE 3 CAPSULES BY MOUTH TWICE DAILY    tacrolimus (GENERIC EQUIVALENT) 1 MG capsule Take 1 capsule (1 mg) by mouth 2 times daily    Vitamin D, Cholecalciferol, 25 MCG (1000 UT) TABS Take 1 tablet (1,000 Units) by mouth daily     No current facility-administered medications for this visit.     There are no discontinued medications.    Physical Exam   Vital Signs: Wt 55.3 kg (122 lb)   BMI 23.64 kg/m      No vitals were taken for this telemedicine visit    General : alert, not in acute distress   HENT : non traumatic  EYES: non icteric   Lungs : able to speak in full sentences, no audible wheezing or no accessory muscle usage  Cardiac : denied any edema   Neuro : mentation and speech intact  Psych : bright.    Data         Latest Ref Rng & Units 7/13/2023     1:10 PM 9/26/2022     9:39 AM 9/10/2021     9:27 AM   Renal   Sodium 136 - 145 mmol/L 139  141  143    K 3.4 - 5.3 mmol/L 4.1  4.3  4.1    Cl 98 - 107 mmol/L 103  106  110    Cl (external) 98 - 107 mmol/L 103  106  110    CO2 22 - 29 mmol/L 28  29  30    Urea Nitrogen 7 - 30 mg/dL   11    Urea Nitrogen 8.0 - 23.0 mg/dL 15.1  11.2     Creatinine 0.51 - 0.95 mg/dL 0.76  0.70  0.69    Glucose 70 - 99 mg/dL 109  102  98    Calcium 8.8 - 10.2 mg/dL 9.8  9.3  9.0          Latest Ref Rng & Units 5/13/2021     9:31 AM 2/19/2020     9:52 AM 9/18/2017     9:19 AM   Bone Health   Vit D Def 20 - 75 ug/L 33  33  34          Latest Ref Rng & Units 7/13/2023      1:10 PM 9/26/2022     9:39 AM 9/10/2021     9:27 AM   Heme   WBC 4.0 - 11.0 10e3/uL 5.2  3.8  4.4    Hgb 11.7 - 15.7 g/dL 12.7  13.1  14.1    Plt 150 - 450 10e3/uL 109  100  108          Latest Ref Rng & Units 4/25/2016     9:08 AM 9/28/2015     7:40 AM 1/26/2015     7:08 AM   Liver   AP 40 - 150 U/L 191  342  209    TBili 0.2 - 1.3 mg/dL 0.5  0.3  0.3    Bilirubin Direct 0.0 - 0.2 mg/dL 0.2      ALT 0 - 50 U/L 36  68  31    AST 0 - 45 U/L 27  45  22    Tot Protein 6.8 - 8.8 g/dL 6.7  6.9  7.0    Albumin 3.4 - 5.0 g/dL 3.5  3.6  3.5          Latest Ref Rng & Units 9/30/2015     6:35 AM   Pancreas   A1C 4.3 - 6.0 % 5.5          Latest Ref Rng & Units 10/8/2015     7:09 AM   Iron studies   Iron 35 - 180 ug/dL 69    Iron Saturation Index 15 - 46 % 28    Ferritin 8 - 252 ng/mL 865          Latest Ref Rng & Units 5/31/2018     9:09 AM 12/27/2017     9:10 AM 9/2/2016     9:02 AM   [facility] Txp Virology   CVM DNA Quant    Plasma    CMV QUANT IU/ML CMVND [IU]/mL   CMV DNA Not Detected   The MADDISON AmpliPrep/MADDISON TaqMan CMV Test is an FDA-approved in vitro nucleic   acid amplification test for the quantitation of cytomegalovirus DNA in human   plasma (EDTA plasma) using the M[employer]ON AmpliPrep Instrument for automated viral   nucleic acid extraction and the [employer]ark TaqMan Analyzer or Ecoark TaqMan for   automated Real Time amplification and detection of the viral nucleic acid   target.   Titer results are reported in International Units/mL (IU/mL using 1st WHO   International standard for Human Cytomegalovirus for Nucleic Acid Amplification   based assays. The conversion factor between CMV DNA copis/mL (as defined by the   Roche MADDISON TaqMan CMV test) and International Units is the CMV DNA   concentration in IU/mL x 1.1 copies/IU = CMV DNA in copies/mL.   This assay has received FDA approval for the testing of human plasma only. The   Infect[facility] Disease Diagnostic Laboratory at the [facility], [place], has  validated the performance characteristics of the Roche   CMV assay for plasma, bronchial alveolar lavage/wash and urine.      LOG IU/ML OF CMVQNT <2.1 [Log_IU]/mL   Not Calculated    BK Spec  Plasma  Plasma     BK Res BKNEG^BK Virus DNA Not Detected copies/mL BK Virus DNA Not Detected  BK Virus DNA Not Detected     BK Log <2.7 Log copies/mL Not Calculated  Not Calculated     EBV DNA COPIES/ML EBVNEG [Copies]/mL   EBV DNA Not Detected    EBV DNA LOG OF COPIES <2.7 [Log_copies]/mL   Not Calculated   The Real-Time quantitative EBV assay was developed and its performance   characteristics determined by the Infectious Diseases Diagnostic Laboratory at   the [facility] in [place], [place].  The   primers and probes are Analyte Specific Reagents (ASRs) manufactured  by   Qiagen.   ASRs are used in many laboratory tests necessary for standard medical care and   generally do not require U.S. Food and Drug Administration approval.  The FDA   has determined that such clearance or approval is not necessary.  This test is   used for clinical purposes.  It should not be regarded as investigational or   research.   This laboratory is certified under the Clinical Laboratory Improvement   Amendments of 1988 (CLIA-88) as qualified to perform high complexity clinical   laboratory testing.   The quantitative range of this assay is 500-22,500,00 copies/mL (2.7-7.4 log   copies/mL).  A negative result does not rule out the presence of PCR inhibitors   in the patient specimen or EBV DNA nucleic acid in concentrations below the   level of detection of the assay.  Inhibition may also lead to underestimation   of viral quantitation.           Recent Labs   Lab Test 05/13/21  0931 09/10/21  0927 09/26/22  0939 07/13/23  1310   DOSTAC 9PM 5/12/21 9/9/2021  --  7/13/2023   TACROL 4.9* 4.9* 4.5* 5.1     Recent Labs   Lab Test 12/21/15  0909 12/28/15  0917 10/17/16  0935   DOSMPA LD 12/20/15 2100 LD 12/27/2015  2100 ld 10/16/16 2100   MPACID 2.66 4.66* 5.02*   MPAG 31.8 37.3 51.7               Again, thank you for allowing me to participate in the care of your patient.      Sincerely,    Melisa Elizabeth MD

## 2023-12-07 PROBLEM — N30.90 BLADDER INFECTION: Status: ACTIVE | Noted: 2023-06-16

## 2023-12-07 PROBLEM — J18.9 LUNG INFECTION: Status: ACTIVE | Noted: 2023-12-04

## 2024-02-12 DIAGNOSIS — Z98.890 OTHER SPECIFIED POSTPROCEDURAL STATES: ICD-10-CM

## 2024-02-12 DIAGNOSIS — Z94.0 KIDNEY TRANSPLANTED: Primary | ICD-10-CM

## 2024-02-12 DIAGNOSIS — Z94.0 KIDNEY REPLACED BY TRANSPLANT: ICD-10-CM

## 2024-02-12 DIAGNOSIS — I15.1 HYPERTENSION SECONDARY TO OTHER RENAL DISORDERS: ICD-10-CM

## 2024-02-12 DIAGNOSIS — I10 ESSENTIAL HYPERTENSION WITH GOAL BLOOD PRESSURE LESS THAN 130/80: ICD-10-CM

## 2024-02-12 DIAGNOSIS — Z48.298 AFTERCARE FOLLOWING ORGAN TRANSPLANT: ICD-10-CM

## 2024-02-12 DIAGNOSIS — Z79.899 ENCOUNTER FOR LONG-TERM CURRENT USE OF MEDICATION: ICD-10-CM

## 2024-02-12 DIAGNOSIS — Z79.60 LONG-TERM USE OF IMMUNOSUPPRESSANT MEDICATION: ICD-10-CM

## 2024-02-12 DIAGNOSIS — Z94.0 KIDNEY TRANSPLANT RECIPIENT: ICD-10-CM

## 2024-02-12 RX ORDER — TACROLIMUS 1 MG/1
1 CAPSULE ORAL 2 TIMES DAILY
Qty: 180 CAPSULE | Refills: 0 | Status: SHIPPED | OUTPATIENT
Start: 2024-02-12 | End: 2024-04-18

## 2024-03-20 DIAGNOSIS — Z94.0 KIDNEY REPLACED BY TRANSPLANT: ICD-10-CM

## 2024-03-20 DIAGNOSIS — Z79.60 LONG-TERM USE OF IMMUNOSUPPRESSANT MEDICATION: ICD-10-CM

## 2024-03-20 DIAGNOSIS — Z94.0 KIDNEY TRANSPLANTED: ICD-10-CM

## 2024-03-20 DIAGNOSIS — I10 ESSENTIAL HYPERTENSION WITH GOAL BLOOD PRESSURE LESS THAN 130/80: ICD-10-CM

## 2024-03-20 DIAGNOSIS — I15.1 HYPERTENSION SECONDARY TO OTHER RENAL DISORDERS: ICD-10-CM

## 2024-03-20 DIAGNOSIS — Z94.0 KIDNEY TRANSPLANT RECIPIENT: ICD-10-CM

## 2024-03-20 RX ORDER — MYCOPHENOLATE MOFETIL 250 MG/1
CAPSULE ORAL
Qty: 180 CAPSULE | Refills: 0 | Status: SHIPPED | OUTPATIENT
Start: 2024-03-20 | End: 2024-04-18

## 2024-03-20 NOTE — TELEPHONE ENCOUNTER
Last transplant lab 7/13/24. --- 8 months ago  Last seen 10/5/23 - Dr Elizabeth advised to continue quarterly labs.

## 2024-04-16 ENCOUNTER — TELEPHONE (OUTPATIENT)
Dept: FAMILY MEDICINE | Facility: CLINIC | Age: 69
End: 2024-04-16
Payer: COMMERCIAL

## 2024-04-16 NOTE — TELEPHONE ENCOUNTER
Patient Quality Outreach    Patient is due for the following:   Breast Cancer Screening - Mammogram    Next Steps:   Patient was scheduled for      Type of outreach:    Sent [employer] message.      Questions for provider review:    None           Emma Santana CMA  Chart routed to  .

## 2024-04-18 DIAGNOSIS — Z94.0 KIDNEY TRANSPLANTED: ICD-10-CM

## 2024-04-18 DIAGNOSIS — Z94.0 KIDNEY TRANSPLANT RECIPIENT: ICD-10-CM

## 2024-04-18 DIAGNOSIS — I15.1 HYPERTENSION SECONDARY TO OTHER RENAL DISORDERS: ICD-10-CM

## 2024-04-18 DIAGNOSIS — I10 ESSENTIAL HYPERTENSION WITH GOAL BLOOD PRESSURE LESS THAN 130/80: ICD-10-CM

## 2024-04-18 DIAGNOSIS — Z94.0 KIDNEY REPLACED BY TRANSPLANT: ICD-10-CM

## 2024-04-18 DIAGNOSIS — Z79.60 LONG-TERM USE OF IMMUNOSUPPRESSANT MEDICATION: ICD-10-CM

## 2024-04-18 RX ORDER — MYCOPHENOLATE MOFETIL 250 MG/1
CAPSULE ORAL
Qty: 180 CAPSULE | Refills: 0 | Status: SHIPPED | OUTPATIENT
Start: 2024-04-18 | End: 2024-05-16

## 2024-04-18 RX ORDER — TACROLIMUS 1 MG/1
1 CAPSULE ORAL 2 TIMES DAILY
Qty: 180 CAPSULE | Refills: 0 | Status: SHIPPED | OUTPATIENT
Start: 2024-04-18 | End: 2024-07-16

## 2024-05-16 DIAGNOSIS — Z94.0 KIDNEY REPLACED BY TRANSPLANT: Primary | ICD-10-CM

## 2024-05-16 DIAGNOSIS — I10 ESSENTIAL HYPERTENSION WITH GOAL BLOOD PRESSURE LESS THAN 130/80: ICD-10-CM

## 2024-05-16 DIAGNOSIS — Z94.0 KIDNEY TRANSPLANT RECIPIENT: ICD-10-CM

## 2024-05-16 DIAGNOSIS — I15.1 HYPERTENSION SECONDARY TO OTHER RENAL DISORDERS: ICD-10-CM

## 2024-05-16 DIAGNOSIS — Z94.0 KIDNEY TRANSPLANTED: ICD-10-CM

## 2024-05-16 DIAGNOSIS — Z79.60 LONG-TERM USE OF IMMUNOSUPPRESSANT MEDICATION: ICD-10-CM

## 2024-05-16 RX ORDER — MYCOPHENOLATE MOFETIL 250 MG/1
750 CAPSULE ORAL 2 TIMES DAILY
Qty: 180 CAPSULE | Refills: 0 | Status: SHIPPED | OUTPATIENT
Start: 2024-05-16 | End: 2024-07-01

## 2024-05-26 ENCOUNTER — HEALTH MAINTENANCE LETTER (OUTPATIENT)
Age: 69
End: 2024-05-26

## 2024-06-03 ENCOUNTER — TELEPHONE (OUTPATIENT)
Dept: TRANSPLANT | Facility: CLINIC | Age: 69
End: 2024-06-03
Payer: COMMERCIAL

## 2024-06-07 ENCOUNTER — TELEPHONE (OUTPATIENT)
Dept: TRANSPLANT | Facility: CLINIC | Age: 69
End: 2024-06-07
Payer: COMMERCIAL

## 2024-06-07 DIAGNOSIS — Z48.298 AFTERCARE FOLLOWING ORGAN TRANSPLANT: ICD-10-CM

## 2024-06-07 DIAGNOSIS — Z94.0 KIDNEY TRANSPLANTED: Primary | ICD-10-CM

## 2024-06-07 NOTE — LETTER
June 7, 2024          Maritza Navarro  [address] [place] [address]          Dear Maritza,      We have been trying to reach you for your needed follow up lab work and physician appointment. We request your adherence to follow up cares to ensure your best outcomes. You should continue lab work every 3 months. We have not received any since 7/13/23. You need a follow up visit with our transplant nephrology team at least once a year. Please schedule your visit for this year.    Continuing to prescribe your medications without proper lab follow up leaves you at risk for serious health complications, including rejection, infection, and malignancy (cancer).    Please call us immediately at [phone] so we can partner with you to ensure this follow up occurs.      Thank you for your prompt attention to this matter,    .    [facility]  Medical Director

## 2024-06-07 NOTE — TELEPHONE ENCOUNTER
Still no labs came through.  Read [employer] message sent 5/16/24.      OUTCOME:  Letter sent.  SOT Referral placed.

## 2024-06-20 ENCOUNTER — LAB (OUTPATIENT)
Dept: LAB | Facility: CLINIC | Age: 69
End: 2024-06-20
Payer: COMMERCIAL

## 2024-06-20 DIAGNOSIS — I10 ESSENTIAL HYPERTENSION WITH GOAL BLOOD PRESSURE LESS THAN 130/80: ICD-10-CM

## 2024-06-20 DIAGNOSIS — Z13.220 SCREENING FOR HYPERLIPIDEMIA: Primary | ICD-10-CM

## 2024-06-20 DIAGNOSIS — Z94.0 KIDNEY REPLACED BY TRANSPLANT: ICD-10-CM

## 2024-06-20 DIAGNOSIS — Z48.298 AFTERCARE FOLLOWING ORGAN TRANSPLANT: ICD-10-CM

## 2024-06-20 DIAGNOSIS — Z79.899 ENCOUNTER FOR LONG-TERM CURRENT USE OF MEDICATION: ICD-10-CM

## 2024-06-20 DIAGNOSIS — Z98.890 OTHER SPECIFIED POSTPROCEDURAL STATES: ICD-10-CM

## 2024-06-20 DIAGNOSIS — Z94.0 KIDNEY TRANSPLANTED: ICD-10-CM

## 2024-06-20 DIAGNOSIS — Z79.60 LONG-TERM USE OF IMMUNOSUPPRESSANT MEDICATION: ICD-10-CM

## 2024-06-20 DIAGNOSIS — Z94.0 KIDNEY TRANSPLANT RECIPIENT: ICD-10-CM

## 2024-06-20 DIAGNOSIS — I15.1 HYPERTENSION SECONDARY TO OTHER RENAL DISORDERS: ICD-10-CM

## 2024-06-20 LAB
ANION GAP SERPL CALCULATED.3IONS-SCNC: 12 MMOL/L (ref 7–15)
BUN SERPL-MCNC: 13.3 MG/DL (ref 8–23)
CALCIUM SERPL-MCNC: 9.5 MG/DL (ref 8.8–10.2)
CHLORIDE SERPL-SCNC: 99 MMOL/L (ref 98–107)
CREAT SERPL-MCNC: 0.7 MG/DL (ref 0.51–0.95)
DEPRECATED HCO3 PLAS-SCNC: 25 MMOL/L (ref 22–29)
EGFRCR SERPLBLD CKD-EPI 2021: >90 ML/MIN/1.73M2
ERYTHROCYTE [DISTWIDTH] IN BLOOD BY AUTOMATED COUNT: 13 % (ref 10–15)
GLUCOSE SERPL-MCNC: 119 MG/DL (ref 70–99)
HCT VFR BLD AUTO: 40.9 % (ref 35–47)
HGB BLD-MCNC: 13.6 G/DL (ref 11.7–15.7)
MCH RBC QN AUTO: 28.2 PG (ref 26.5–33)
MCHC RBC AUTO-ENTMCNC: 33.3 G/DL (ref 31.5–36.5)
MCV RBC AUTO: 85 FL (ref 78–100)
PLATELET # BLD AUTO: 112 10E3/UL (ref 150–450)
POTASSIUM SERPL-SCNC: 4.1 MMOL/L (ref 3.4–5.3)
RBC # BLD AUTO: 4.82 10E6/UL (ref 3.8–5.2)
SODIUM SERPL-SCNC: 136 MMOL/L (ref 135–145)
WBC # BLD AUTO: 5.7 10E3/UL (ref 4–11)

## 2024-06-20 PROCEDURE — 36415 COLL VENOUS BLD VENIPUNCTURE: CPT

## 2024-06-20 PROCEDURE — 80197 ASSAY OF TACROLIMUS: CPT

## 2024-06-20 PROCEDURE — 80048 BASIC METABOLIC PNL TOTAL CA: CPT

## 2024-06-20 PROCEDURE — 85027 COMPLETE CBC AUTOMATED: CPT

## 2024-06-21 ENCOUNTER — TELEPHONE (OUTPATIENT)
Dept: TRANSPLANT | Facility: CLINIC | Age: 69
End: 2024-06-21
Payer: COMMERCIAL

## 2024-06-21 DIAGNOSIS — Z94.0 KIDNEY TRANSPLANTED: Primary | ICD-10-CM

## 2024-06-21 LAB
TACROLIMUS BLD-MCNC: 7.2 UG/L (ref 5–15)
TME LAST DOSE: NORMAL H
TME LAST DOSE: NORMAL H

## 2024-06-21 NOTE — TELEPHONE ENCOUNTER
Left message and sent [employer]UIQt message to patient regarding:  Tacrolimus = 7.2 (6/20/24)  Goal 4-6  Current tac dose 1 mg BID     Last dose 6/20 9:30 AM  Lab draw 6/20 1:34 PM  --- 4 hour trough           PLAN:   Verify current dose.   stay on the same dose.      Recheck level in 2 weeks and make sure it is a good trough to avoid additional lab draws

## 2024-06-21 NOTE — TELEPHONE ENCOUNTER
Tacrolimus = 7.2 (6/20/24)  Goal 4-6  Current tac dose 1 mg BID    Last dose 6/20 9:30 AM  Lab draw 6/20 1:34 PM  --- 4 hour trough        PLAN:   Verify current dose.   stay on the same dose.     Recheck level in 2 weeks and make sure it is a good trough to avoid additional lab draws.

## 2024-06-29 DIAGNOSIS — Z79.60 LONG-TERM USE OF IMMUNOSUPPRESSANT MEDICATION: ICD-10-CM

## 2024-06-29 DIAGNOSIS — Z94.0 KIDNEY REPLACED BY TRANSPLANT: ICD-10-CM

## 2024-06-29 DIAGNOSIS — Z94.0 KIDNEY TRANSPLANTED: ICD-10-CM

## 2024-06-29 DIAGNOSIS — I15.1 HYPERTENSION SECONDARY TO OTHER RENAL DISORDERS: ICD-10-CM

## 2024-06-29 DIAGNOSIS — I10 ESSENTIAL HYPERTENSION WITH GOAL BLOOD PRESSURE LESS THAN 130/80: ICD-10-CM

## 2024-06-29 DIAGNOSIS — Z94.0 KIDNEY TRANSPLANT RECIPIENT: ICD-10-CM

## 2024-07-01 DIAGNOSIS — Z94.0 KIDNEY TRANSPLANTED: ICD-10-CM

## 2024-07-01 DIAGNOSIS — I10 ESSENTIAL HYPERTENSION WITH GOAL BLOOD PRESSURE LESS THAN 130/80: ICD-10-CM

## 2024-07-01 DIAGNOSIS — I15.1 HYPERTENSION SECONDARY TO OTHER RENAL DISORDERS: ICD-10-CM

## 2024-07-01 DIAGNOSIS — Z94.0 KIDNEY REPLACED BY TRANSPLANT: ICD-10-CM

## 2024-07-01 DIAGNOSIS — Z79.60 LONG-TERM USE OF IMMUNOSUPPRESSANT MEDICATION: ICD-10-CM

## 2024-07-01 DIAGNOSIS — Z94.0 KIDNEY TRANSPLANT RECIPIENT: ICD-10-CM

## 2024-07-01 RX ORDER — MYCOPHENOLATE MOFETIL 250 MG/1
CAPSULE ORAL
Qty: 180 CAPSULE | Refills: 11 | Status: SHIPPED | OUTPATIENT
Start: 2024-07-01 | End: 2024-07-01

## 2024-07-01 RX ORDER — MYCOPHENOLATE MOFETIL 250 MG/1
CAPSULE ORAL
Qty: 180 CAPSULE | Refills: 11 | Status: SHIPPED | OUTPATIENT
Start: 2024-07-01

## 2024-07-08 DIAGNOSIS — I15.1 HYPERTENSION SECONDARY TO OTHER RENAL DISORDERS: ICD-10-CM

## 2024-07-08 DIAGNOSIS — Z94.0 KIDNEY TRANSPLANTED: ICD-10-CM

## 2024-07-08 DIAGNOSIS — Z79.60 LONG-TERM USE OF IMMUNOSUPPRESSANT MEDICATION: ICD-10-CM

## 2024-07-08 DIAGNOSIS — Z94.0 KIDNEY REPLACED BY TRANSPLANT: ICD-10-CM

## 2024-07-10 RX ORDER — AMLODIPINE BESYLATE 5 MG/1
5 TABLET ORAL AT BEDTIME
Qty: 90 TABLET | Refills: 0 | Status: SHIPPED | OUTPATIENT
Start: 2024-07-10 | End: 2024-10-04

## 2024-07-10 NOTE — TELEPHONE ENCOUNTER
Please call patient. They are due for an office visit /follow up and possibly labs.  Refilled [id number] you . Betty Schwab M.D.

## 2024-07-16 DIAGNOSIS — Z79.60 LONG-TERM USE OF IMMUNOSUPPRESSANT MEDICATION: ICD-10-CM

## 2024-07-16 DIAGNOSIS — I15.1 HYPERTENSION SECONDARY TO OTHER RENAL DISORDERS: ICD-10-CM

## 2024-07-16 DIAGNOSIS — Z94.0 KIDNEY TRANSPLANTED: ICD-10-CM

## 2024-07-16 DIAGNOSIS — Z94.0 KIDNEY TRANSPLANT RECIPIENT: ICD-10-CM

## 2024-07-16 DIAGNOSIS — I10 ESSENTIAL HYPERTENSION WITH GOAL BLOOD PRESSURE LESS THAN 130/80: ICD-10-CM

## 2024-07-16 DIAGNOSIS — Z94.0 KIDNEY REPLACED BY TRANSPLANT: ICD-10-CM

## 2024-07-16 RX ORDER — TACROLIMUS 1 MG/1
1 CAPSULE ORAL 2 TIMES DAILY
Qty: 180 CAPSULE | Refills: 0 | Status: SHIPPED | OUTPATIENT
Start: 2024-07-16

## 2024-10-04 DIAGNOSIS — Z79.60 LONG-TERM USE OF IMMUNOSUPPRESSANT MEDICATION: ICD-10-CM

## 2024-10-04 DIAGNOSIS — Z94.0 KIDNEY TRANSPLANTED: ICD-10-CM

## 2024-10-04 DIAGNOSIS — I15.1 HYPERTENSION SECONDARY TO OTHER RENAL DISORDERS: ICD-10-CM

## 2024-10-04 DIAGNOSIS — Z94.0 KIDNEY REPLACED BY TRANSPLANT: ICD-10-CM

## 2024-10-04 RX ORDER — AMLODIPINE BESYLATE 5 MG/1
5 TABLET ORAL AT BEDTIME
Qty: 90 TABLET | Refills: 0 | Status: SHIPPED | OUTPATIENT
Start: 2024-10-04

## 2024-10-04 NOTE — TELEPHONE ENCOUNTER
Has appointment scheduled for 11/14/24.      Requested Prescriptions   Pending Prescriptions Disp Refills    amLODIPine (NORVASC) 5 MG tablet [Pharmacy Med Name: amLODIPine Besylate 5 MG Oral Tablet] 90 tablet 0     Sig: TAKE 1 TABLET BY MOUTH AT BEDTIME       Calcium Channel Blockers Protocol  Failed - 10/4/2024  9:31 AM        Failed - Blood pressure under 140/90 in past 12 months     BP Readings from Last 3 Encounters:   07/13/23 (!) 142/86   09/13/21 (!) 150/82   05/13/21 (!) 144/94       No data recorded            Passed - Medication is active on med list        Passed - GFR is on file in the past 12 months and most recent GFR is normal        Passed - Recent (12 mo) or future (90 days) visit within the authorizing provider's specialty     The patient must have completed an in-person or virtual visit within the past 12 months or has a future visit scheduled within the next 90 days with the authorizing provider s specialty.  Urgent care and e-visits do not quality as an office visit for this protocol.          Passed - Patient is age 18 or older        Passed - No active pregnancy on record        Passed - No positive pregnancy test in past 12 months

## 2024-10-18 DIAGNOSIS — I15.1 HYPERTENSION SECONDARY TO OTHER RENAL DISORDERS: ICD-10-CM

## 2024-10-18 DIAGNOSIS — I10 ESSENTIAL HYPERTENSION WITH GOAL BLOOD PRESSURE LESS THAN 130/80: ICD-10-CM

## 2024-10-18 DIAGNOSIS — Z94.0 KIDNEY TRANSPLANT RECIPIENT: ICD-10-CM

## 2024-10-18 DIAGNOSIS — Z94.0 KIDNEY TRANSPLANTED: Primary | ICD-10-CM

## 2024-10-18 DIAGNOSIS — Z79.60 LONG-TERM USE OF IMMUNOSUPPRESSANT MEDICATION: ICD-10-CM

## 2024-10-18 DIAGNOSIS — Z94.0 KIDNEY REPLACED BY TRANSPLANT: ICD-10-CM

## 2024-10-18 RX ORDER — TACROLIMUS 1 MG/1
1 CAPSULE ORAL 2 TIMES DAILY
Qty: 180 CAPSULE | Refills: 0 | Status: SHIPPED | OUTPATIENT
Start: 2024-10-18

## 2024-11-01 ENCOUNTER — LAB (OUTPATIENT)
Dept: LAB | Facility: CLINIC | Age: 69
End: 2024-11-01
Payer: COMMERCIAL

## 2024-11-01 DIAGNOSIS — Z98.890 OTHER SPECIFIED POSTPROCEDURAL STATES: ICD-10-CM

## 2024-11-01 DIAGNOSIS — I15.1 HYPERTENSION SECONDARY TO OTHER RENAL DISORDERS: ICD-10-CM

## 2024-11-01 DIAGNOSIS — Z13.220 SCREENING FOR HYPERLIPIDEMIA: ICD-10-CM

## 2024-11-01 DIAGNOSIS — Z94.0 KIDNEY TRANSPLANTED: ICD-10-CM

## 2024-11-01 DIAGNOSIS — Z94.0 KIDNEY TRANSPLANT RECIPIENT: ICD-10-CM

## 2024-11-01 DIAGNOSIS — Z48.298 AFTERCARE FOLLOWING ORGAN TRANSPLANT: ICD-10-CM

## 2024-11-01 DIAGNOSIS — Z79.60 LONG-TERM USE OF IMMUNOSUPPRESSANT MEDICATION: ICD-10-CM

## 2024-11-01 DIAGNOSIS — Z94.0 KIDNEY REPLACED BY TRANSPLANT: ICD-10-CM

## 2024-11-01 DIAGNOSIS — Z79.899 ENCOUNTER FOR LONG-TERM CURRENT USE OF MEDICATION: ICD-10-CM

## 2024-11-01 DIAGNOSIS — I10 ESSENTIAL HYPERTENSION WITH GOAL BLOOD PRESSURE LESS THAN 130/80: ICD-10-CM

## 2024-11-01 LAB
ANION GAP SERPL CALCULATED.3IONS-SCNC: 14 MMOL/L (ref 7–15)
BUN SERPL-MCNC: 10.9 MG/DL (ref 8–23)
CALCIUM SERPL-MCNC: 9.6 MG/DL (ref 8.8–10.4)
CHLORIDE SERPL-SCNC: 102 MMOL/L (ref 98–107)
CHOLEST SERPL-MCNC: 187 MG/DL
CREAT SERPL-MCNC: 0.72 MG/DL (ref 0.51–0.95)
EGFRCR SERPLBLD CKD-EPI 2021: >90 ML/MIN/1.73M2
ERYTHROCYTE [DISTWIDTH] IN BLOOD BY AUTOMATED COUNT: 12.9 % (ref 10–15)
FASTING STATUS PATIENT QL REPORTED: NO
FASTING STATUS PATIENT QL REPORTED: NO
GLUCOSE SERPL-MCNC: 109 MG/DL (ref 70–99)
HCO3 SERPL-SCNC: 25 MMOL/L (ref 22–29)
HCT VFR BLD AUTO: 42.4 % (ref 35–47)
HDLC SERPL-MCNC: 72 MG/DL
HGB BLD-MCNC: 14.1 G/DL (ref 11.7–15.7)
LDLC SERPL CALC-MCNC: 102 MG/DL
MCH RBC QN AUTO: 28.4 PG (ref 26.5–33)
MCHC RBC AUTO-ENTMCNC: 33.3 G/DL (ref 31.5–36.5)
MCV RBC AUTO: 86 FL (ref 78–100)
NONHDLC SERPL-MCNC: 115 MG/DL
PLATELET # BLD AUTO: 122 10E3/UL (ref 150–450)
POTASSIUM SERPL-SCNC: 3.7 MMOL/L (ref 3.4–5.3)
RBC # BLD AUTO: 4.96 10E6/UL (ref 3.8–5.2)
SODIUM SERPL-SCNC: 141 MMOL/L (ref 135–145)
TACROLIMUS BLD-MCNC: 5.7 UG/L (ref 5–15)
TME LAST DOSE: NORMAL H
TME LAST DOSE: NORMAL H
TRIGL SERPL-MCNC: 64 MG/DL
WBC # BLD AUTO: 5.4 10E3/UL (ref 4–11)

## 2024-11-01 PROCEDURE — 36415 COLL VENOUS BLD VENIPUNCTURE: CPT

## 2024-11-01 PROCEDURE — 80197 ASSAY OF TACROLIMUS: CPT

## 2024-11-01 PROCEDURE — 80061 LIPID PANEL: CPT

## 2024-11-01 PROCEDURE — 85027 COMPLETE CBC AUTOMATED: CPT

## 2024-11-01 PROCEDURE — 80048 BASIC METABOLIC PNL TOTAL CA: CPT

## 2024-11-02 NOTE — RESULT ENCOUNTER NOTE
Maritza,  Your lab results were normal/stable. Please feel free to my chart or call the office with questions. Betty Schwab M.D.

## 2024-11-10 SDOH — HEALTH STABILITY: PHYSICAL HEALTH: ON AVERAGE, HOW MANY DAYS PER WEEK DO YOU ENGAGE IN MODERATE TO STRENUOUS EXERCISE (LIKE A BRISK WALK)?: 3 DAYS

## 2024-11-10 SDOH — HEALTH STABILITY: PHYSICAL HEALTH: ON AVERAGE, HOW MANY MINUTES DO YOU ENGAGE IN EXERCISE AT THIS LEVEL?: 80 MIN

## 2024-11-10 ASSESSMENT — SOCIAL DETERMINANTS OF HEALTH (SDOH): HOW OFTEN DO YOU GET TOGETHER WITH FRIENDS OR RELATIVES?: TWICE A WEEK

## 2024-11-14 ENCOUNTER — OFFICE VISIT (OUTPATIENT)
Dept: FAMILY MEDICINE | Facility: CLINIC | Age: 69
End: 2024-11-14
Payer: COMMERCIAL

## 2024-11-14 VITALS
DIASTOLIC BLOOD PRESSURE: 78 MMHG | TEMPERATURE: 99.3 F | BODY MASS INDEX: 23.16 KG/M2 | HEIGHT: 60 IN | HEART RATE: 98 BPM | OXYGEN SATURATION: 98 % | WEIGHT: 118 LBS | SYSTOLIC BLOOD PRESSURE: 136 MMHG

## 2024-11-14 DIAGNOSIS — Z94.0 KIDNEY TRANSPLANTED: ICD-10-CM

## 2024-11-14 DIAGNOSIS — Z78.0 POSTMENOPAUSAL STATUS: ICD-10-CM

## 2024-11-14 DIAGNOSIS — E55.9 VITAMIN D DEFICIENCY: ICD-10-CM

## 2024-11-14 DIAGNOSIS — Z79.60 LONG-TERM USE OF IMMUNOSUPPRESSANT MEDICATION: ICD-10-CM

## 2024-11-14 DIAGNOSIS — I15.1 HYPERTENSION SECONDARY TO OTHER RENAL DISORDERS: ICD-10-CM

## 2024-11-14 DIAGNOSIS — R01.1 HEART MURMUR: ICD-10-CM

## 2024-11-14 DIAGNOSIS — Z00.00 ENCOUNTER FOR MEDICARE ANNUAL WELLNESS EXAM: Primary | ICD-10-CM

## 2024-11-14 DIAGNOSIS — Z94.0 KIDNEY REPLACED BY TRANSPLANT: ICD-10-CM

## 2024-11-14 DIAGNOSIS — Z12.31 VISIT FOR SCREENING MAMMOGRAM: ICD-10-CM

## 2024-11-14 PROCEDURE — 99213 OFFICE O/P EST LOW 20 MIN: CPT | Mod: 25 | Performed by: FAMILY MEDICINE

## 2024-11-14 PROCEDURE — G0438 PPPS, INITIAL VISIT: HCPCS | Performed by: FAMILY MEDICINE

## 2024-11-14 RX ORDER — AMLODIPINE BESYLATE 5 MG/1
5 TABLET ORAL AT BEDTIME
Qty: 90 TABLET | Refills: 3 | Status: SHIPPED | OUTPATIENT
Start: 2024-11-14

## 2024-11-14 NOTE — PROGRESS NOTES
Preventive Care Visit  [facility] RIK Schwab MD, Family Medicine  Nov 14, 2024      Assessment & Plan     Encounter for Medicare annual wellness exam      Visit for screening mammogram  Due   - MA Screening Bilateral w/ Charles; Future    Vitamin D deficiency  Controlled keep on current over the counter dose     Hypertension secondary to other renal disorders  Well controlled   - amLODIPine (NORVASC) 5 MG tablet; Take 1 tablet (5 mg) by mouth at bedtime.    Kidney replaced by transplant    - amLODIPine (NORVASC) 5 MG tablet; Take 1 tablet (5 mg) by mouth at bedtime.    Long-term use of immunosuppressant medication    - amLODIPine (NORVASC) 5 MG tablet; Take 1 tablet (5 mg) by mouth at bedtime.    Kidney transplanted  Cont for blood pressure control   - amLODIPine (NORVASC) 5 MG tablet; Take 1 tablet (5 mg) by mouth at bedtime.    Postmenopausal status  Due for follow up   - DEXA HIP/PELVIS/SPINE - Future; Future    Heart murmur  Will have this evaluated   - Echocardiogram Complete; Future    Patient has been advised of split billing requirements and indicates understanding: Yes        Counseling  Appropriate preventive services were addressed with this patient via screening, questionnaire, or discussion as appropriate for fall prevention, nutrition, physical activity, Tobacco-use cessation, social engagement, weight loss and cognition.  Checklist reviewing preventive services available has been given to the patient.      Check echo     Kelsea Salas is a 68 year old, presenting for the following:  Physical        11/14/2024     3:10 PM   Additional Questions   Roomed by Emma TORRES CMA         HPI    Immunizations: Declines     Hypertension Follow-up  Do you check your blood pressure regularly outside of the clinic? Yes   Are you following a low salt diet? Yes  Are your blood pressures ever more than 140 on the top number (systolic) OR more   than 90 on the bottom number (diastolic), for  example 140/90? No    BP Readings from Last 6 Encounters:   11/14/24 136/78   07/13/23 (!) 142/86   09/13/21 (!) 150/82   05/13/21 (!) 144/94   09/09/19 130/84   09/04/18 138/84             Health Care Directive  Patient does not have a Health Care Directive: Discussed advance care planning with patient; information given to patient to review.      11/10/2024   General Health   How would you rate your overall physical health? Good   Feel stress (tense, anxious, or unable to sleep) Only a little      (!) STRESS CONCERN      11/10/2024   Nutrition   Diet: Regular (no restrictions)            11/10/2024   Exercise   Days per week of moderate/strenous exercise 3 days   Average minutes spent exercising at this level 80 min            11/10/2024   Social Factors   Frequency of gathering with friends or relatives Twice a week   Worry food won't last until get money to buy more No   Food not last or not have enough money for food? No   Do you have housing? (Housing is defined as stable permanent housing and does not include staying ouside in a car, in a tent, in an abandoned building, in an overnight shelter, or couch-surfing.) Yes   Are you worried about losing your housing? No   Lack of transportation? No   Unable to get utilities (heat,electricity)? No            11/10/2024   Fall Risk   Fallen 2 or more times in the past year? No     No    Trouble with walking or balance? No     No        Patient-reported    Multiple values from one day are sorted in reverse-chronological order          11/10/2024   Activities of Daily Living- Home Safety   Needs help with the following daily activites None of the above   Safety concerns in the home None of the above            11/10/2024   Dental   Dentist two times every year? (!) NO            11/10/2024   Hearing Screening   Hearing concerns? None of the above            11/10/2024   Driving Risk Screening   Patient/family members have concerns about driving No            11/10/2024    General Alertness/Fatigue Screening   Have you been more tired than usual lately? (!) YES            11/10/2024   Urinary Incontinence Screening   Bothered by leaking urine in past 6 months No                 Today's PHQ-2 Score:       11/14/2024     3:06 PM   PHQ-2 ( 1999 Pfizer)   Q1: Little interest or pleasure in doing things 0    Q2: Feeling down, depressed or hopeless 0    PHQ-2 Score 0    Q1: Little interest or pleasure in doing things Not at all   Q2: Feeling down, depressed or hopeless Not at all   PHQ-2 Score 0       Patient-reported         11/10/2024   Substance Use   Alcohol more than 3/day or more than 7/wk No   Do you have a current opioid prescription? No   How severe/bad is pain from 1 to 10? 0/10 (No Pain)   Do you use any other substances recreationally? No        Social History     Tobacco Use    Smoking status: Never    Smokeless tobacco: Never   Vaping Use    Vaping status: Never Used   Substance Use Topics    Alcohol use: Yes     Alcohol/week: 0.8 standard drinks of alcohol     Types: 1 Standard drinks or equivalent per week    Drug use: No          Mammogram Screening - After age 74- determine frequency with patient based on health status, life expectancy and patient goals      History of abnormal Pap smear: No - age 65 or older with adequate negative prior screening test results (3 consecutive negative cytology results, 2 consecutive negative cotesting results, or 2 consecutive negative HrHPV test results within 10 years, with the most recent test occurring within the recommended screening interval for the test used)       ASCVD Risk   The 10-year ASCVD risk score (Maida LEON, et al., 2019) is: 10.4%    Values used to calculate the score:      Age: 68 years      Sex: Female      Is Non- : No      Diabetic: No      Tobacco smoker: No      Systolic Blood Pressure: 136 mmHg      Is BP treated: Yes      HDL Cholesterol: 72 mg/dL      Total Cholesterol: 187  mg/dL  HER SISTER [place] dxd with ra she does           Reviewed and updated as needed this visit by Provider                    Past Medical History:   Diagnosis Date    Anemia in chronic kidney disease(285.21)     Bladder infection 06/16/2023    CKD (chronic kidney disease) stage 4, GFR 15-29 ml/min (H)     Dyslipidemia     Hypertension     Infection due to 2019 novel coronavirus 11/01/2021    Lung infection 12/04/2023    Polycystic kidney disease     Polycystic liver disease     Secondary hyperparathyroidism (of renal origin)     Vitamin D deficiency      Current providers sharing in care for this patient include:  Patient Care Team:  Betty Schwab MD as PCP - General (Family Medicine)  Kathleen Key MD (Family Practice)  Soha Aceves MD as Referring Physician (Nephrology)  Maxim Brooke MD as MD (Nephrology)  Betty Schwab MD as Assigned PCP  Melisa Elizabeth MD as Assigned Nephrology Provider    The following health maintenance items are reviewed in Epic and correct as of today:  Health Maintenance   Topic Date Due    DEXA  Never done    ZOSTER IMMUNIZATION (1 of 2) Never done    RSV VACCINE (1 - Risk 60-74 years 1-dose series) Never done    Pneumococcal Vaccine: 65+ Years (2 of 2 - PCV) 07/23/2016    MAMMO SCREENING  04/06/2017    DTAP/TDAP/TD IMMUNIZATION (2 - Td or Tdap) 12/28/2019    ADVANCE CARE PLANNING  04/06/2020    COVID-19 Vaccine (3 - Pfizer risk series) 04/30/2021    ANNUAL REVIEW OF HM ORDERS  07/13/2024    INFLUENZA VACCINE (1) 09/01/2024    COLORECTAL CANCER SCREENING  08/03/2025    BMP  11/01/2025    LIPID  11/01/2025    MEDICARE ANNUAL WELLNESS VISIT  11/14/2025    FALL RISK ASSESSMENT  11/14/2025    GLUCOSE  11/01/2027    HEPATITIS C SCREENING  Completed    PHQ-2 (once per calendar year)  Completed    HPV IMMUNIZATION  Aged Out    MENINGITIS IMMUNIZATION  Aged Out    RSV MONOCLONAL ANTIBODY  Aged Out    PAP  Discontinued         Review of Systems  Constitutional,  HEENT, cardiovascular, pulmonary, gi and gu systems are negative, except as otherwise noted.     Objective    Exam  /78 (BP Location: Right arm, Patient Position: Sitting, Cuff Size: Adult Regular)   Pulse 98   Temp 99.3  F (37.4  C) (Tympanic)   Ht 1.524 m (5')   Wt 53.5 kg (118 lb)   SpO2 98%   BMI 23.05 kg/m     Estimated body mass index is 23.05 kg/m  as calculated from the following:    Height as of this encounter: 1.524 m (5').    Weight as of this encounter: 53.5 kg (118 lb).    Physical Exam  GENERAL: alert and no distress  EYES: Eyes grossly normal to inspection, PERRL and conjunctivae and sclerae normal  HENT: ear canals and TM's normal, nose and mouth without ulcers or lesions  NECK: no adenopathy, no asymmetry, masses, or scars  RESP: lungs clear to auscultation - no rales, rhonchi or wheezes  CV: regular rates and rhythm, grade 2/6 early systolic high pitched  murmur heard best over the lusb, peripheral pulses strong, and no peripheral edema         11/14/2024   Mini Cog   Clock Draw Score 2 Normal   3 Item Recall 2 objects recalled   Mini Cog Total Score 4                 Signed Electronically by: Betty Schwab MD

## 2024-11-14 NOTE — PATIENT INSTRUCTIONS
Patient Education   Preventive Care Advice   This is general advice given by our system to help you stay healthy. However, your care team may have specific advice just for you. Please talk to your care team about your preventive care needs.  Nutrition  Eat 5 or more servings of fruits and vegetables each day.  Try wheat bread, brown rice and whole grain pasta (instead of white bread, rice, and pasta).  Get enough calcium and vitamin D. Check the label on foods and aim for 100% of the RDA (recommended daily allowance).  Lifestyle  Exercise at least 150 minutes each week  (30 minutes a day, 5 days a week).  Do muscle strengthening activities 2 days a week. These help control your weight and prevent disease.  No smoking.  Wear sunscreen to prevent skin cancer.  Have a dental exam and cleaning every 6 months.  Yearly exams  See your health care team every year to talk about:  Any changes in your health.  Any medicines your care team has prescribed.  Preventive care, family planning, and ways to prevent chronic diseases.  Shots (vaccines)   HPV shots (up to age 26), if you've never had them before.  Hepatitis B shots (up to age 59), if you've never had them before.  COVID-19 shot: Get this shot when it's due.  Flu shot: Get a flu shot every year.  Tetanus shot: Get a tetanus shot every 10 years.  Pneumococcal, hepatitis A, and RSV shots: Ask your care team if you need these based on your risk.  Shingles shot (for age 50 and up)  General health tests  Diabetes screening:  Starting at age 35, Get screened for diabetes at least every 3 years.  If you are younger than age 35, ask your care team if you should be screened for diabetes.  Cholesterol test: At age 39, start having a cholesterol test every 5 years, or more often if advised.  Bone density scan (DEXA): At age 50, ask your care team if you should have this scan for osteoporosis (brittle bones).  Hepatitis C: Get tested at least once in your life.  STIs (sexually  transmitted infections)  Before age 24: Ask your care team if you should be screened for STIs.  After age 24: Get screened for STIs if you're at risk. You are at risk for STIs (including HIV) if:  You are sexually active with more than one person.  You don't use condoms every time.  You or a partner was diagnosed with a sexually transmitted infection.  If you are at risk for HIV, ask about PrEP medicine to prevent HIV.  Get tested for HIV at least once in your life, whether you are at risk for HIV or not.  Cancer screening tests  Cervical cancer screening: If you have a cervix, begin getting regular cervical cancer screening tests starting at age 21.  Breast cancer scan (mammogram): If you've ever had breasts, begin having regular mammograms starting at age 40. This is a scan to check for breast cancer.  Colon cancer screening: It is important to start screening for colon cancer at age 45.  Have a colonoscopy test every 10 years (or more often if you're at risk) Or, ask your provider about stool tests like a FIT test every year or Cologuard test every 3 years.  To learn more about your testing options, visit:   .  For help making a decision, visit:   https://bit.ly/rc51075.  Prostate cancer screening test: If you have a prostate, ask your care team if a prostate cancer screening test (PSA) at age 55 is right for you.  Lung cancer screening: If you are a current or former smoker ages 50 to 80, ask your care team if ongoing lung cancer screenings are right for you.  For informational purposes only. Not to replace the advice of your health care provider. Copyright   2023 [facility]. All rights reserved. Clinically reviewed by the [facility] Transitions [facility]. [place] [id number] - REV 01/24.  Learning About Sleeping Well  What does sleeping well mean?     Sleeping well means getting enough sleep to feel good and stay healthy. How much sleep is enough varies among people.  The number of hours you  sleep and how you feel when you wake up are both important. If you do not feel refreshed, you probably need more sleep. Another sign of not getting enough sleep is feeling tired during the day.  Experts recommend that adults get at least 7 or more hours of sleep per day. Children and older adults need more sleep.  Why is getting enough sleep important?  Getting enough quality sleep is a basic part of good health. When your sleep suffers, your physical health, mood, and your thoughts can suffer too. You may find yourself feeling more grumpy or stressed. Not getting enough sleep also can lead to serious problems, including injury, accidents, anxiety, and depression.  What might cause poor sleeping?  Many things can cause sleep problems, including:  Changes to your sleep schedule.  Stress. Stress can be caused by fear about a single event, such as giving a speech. Or you may have ongoing stress, such as worry about work or school.  Depression, anxiety, and other mental or emotional conditions.  Changes in your sleep habits or surroundings. This includes changes that happen where you sleep, such as noise, light, or sleeping in a different bed. It also includes changes in your sleep pattern, such as having jet lag or working a late shift.  Health problems, such as pain, breathing problems, and restless legs syndrome.  Lack of regular exercise.  Using alcohol, nicotine, or caffeine before bed.  How can you help yourself?  Here are some tips that may help you sleep more soundly and wake up feeling more refreshed.  Your sleeping area   Use your bedroom only for sleeping and sex. A bit of light reading may help you fall asleep. But if it doesn't, do your reading elsewhere in the house. Try not to use your TV, computer, smartphone, or tablet while you are in bed.  Be sure your bed is big enough to stretch out comfortably, especially if you have a sleep partner.  Keep your bedroom quiet, dark, and cool. Use curtains, blinds,  "or a sleep mask to block out light. To block out noise, use earplugs, soothing music, or a \"white noise\" machine.  Your evening and bedtime routine   Create a relaxing bedtime routine. You might want to take a warm shower or bath, or listen to soothing music.  Go to bed at the same time every night. And get up at the same time every morning, even if you feel tired.  What to avoid   Limit caffeine (coffee, tea, caffeinated sodas) during the day, and don't have any for at least 6 hours before bedtime.  Avoid drinking alcohol before bedtime. Alcohol can cause you to wake up more often during the night.  Try not to smoke or use tobacco, especially in the evening. Nicotine can keep you awake.  Limit naps during the day, especially close to bedtime.  Avoid lying in bed awake for too long. If you can't fall asleep or if you wake up in the middle of the night and can't get back to sleep within about 20 minutes, get out of bed and go to another room until you feel sleepy.  Avoid taking medicine right before bed that may keep you awake or make you feel hyper or energized. Your doctor can tell you if your medicine may do this and if you can take it earlier in the day.  If you can't sleep   Imagine yourself in a peaceful, pleasant scene. Focus on the details and feelings of being in a place that is relaxing.  Get up and do a quiet or boring activity until you feel sleepy.  Avoid drinking any liquids before going to bed to help prevent waking up often to use the bathroom.  Where can you learn more?  Go to https://www."[employer].net/patiented  Enter J942 in the search box to learn more about \"Learning About Sleeping Well.\"  Current as of: July 10, 2023  Content Version: 14.2 2024 [employer]   Care instructions adapted under license by your healthcare professional. If you have questions about a medical condition or this instruction, always ask your healthcare professional. [employer] disclaims any " warranty or liability for your use of this information.

## 2024-11-15 ENCOUNTER — OFFICE VISIT (OUTPATIENT)
Dept: TRANSPLANT | Facility: CLINIC | Age: 69
End: 2024-11-15
Attending: INTERNAL MEDICINE
Payer: COMMERCIAL

## 2024-11-15 ENCOUNTER — PATIENT OUTREACH (OUTPATIENT)
Dept: CARE COORDINATION | Facility: CLINIC | Age: 69
End: 2024-11-15
Payer: COMMERCIAL

## 2024-11-15 VITALS
HEART RATE: 91 BPM | BODY MASS INDEX: 23.46 KG/M2 | WEIGHT: 120.1 LBS | DIASTOLIC BLOOD PRESSURE: 79 MMHG | OXYGEN SATURATION: 97 % | SYSTOLIC BLOOD PRESSURE: 131 MMHG

## 2024-11-15 DIAGNOSIS — D84.9 IMMUNOSUPPRESSION (H): ICD-10-CM

## 2024-11-15 DIAGNOSIS — Q61.3 POLYCYSTIC KIDNEY DISEASE: ICD-10-CM

## 2024-11-15 DIAGNOSIS — I15.1 HTN, KIDNEY TRANSPLANT RELATED: ICD-10-CM

## 2024-11-15 DIAGNOSIS — Z94.0 KIDNEY TRANSPLANTED: ICD-10-CM

## 2024-11-15 DIAGNOSIS — E78.5 DYSLIPIDEMIA: ICD-10-CM

## 2024-11-15 DIAGNOSIS — D69.6 THROMBOCYTOPENIA (H): ICD-10-CM

## 2024-11-15 DIAGNOSIS — Q44.6 POLYCYSTIC LIVER DISEASE: ICD-10-CM

## 2024-11-15 DIAGNOSIS — Z94.0 HTN, KIDNEY TRANSPLANT RELATED: ICD-10-CM

## 2024-11-15 DIAGNOSIS — L65.9 ALOPECIA: ICD-10-CM

## 2024-11-15 DIAGNOSIS — Z48.298 AFTERCARE FOLLOWING ORGAN TRANSPLANT: ICD-10-CM

## 2024-11-15 DIAGNOSIS — E55.9 VITAMIN D DEFICIENCY: Primary | ICD-10-CM

## 2024-11-15 PROCEDURE — G0463 HOSPITAL OUTPT CLINIC VISIT: HCPCS | Performed by: INTERNAL MEDICINE

## 2024-11-15 ASSESSMENT — PAIN SCALES - GENERAL: PAINLEVEL_OUTOF10: NO PAIN (0)

## 2024-11-15 NOTE — LETTER
11/15/2024      Maritza Navarro  [address] [place] [address]      Dear Colleague,    Thank you for referring your patient, Maritza Navarro, to the [facility]. Please see a copy of my visit note below.    TRANSPLANT NEPHROLOGY CLINIC VISIT     Assessment & Plan  # LDKT: CKD Stage 1 - Stable   - Baseline Creatinine: ~ 0.7-0.9   - Proteinuria: Normal (<0.2 grams)   - DSA Hx: No DSA   - Last cPRA: 0%   - BK Viremia: Not checked recently due to time from transplant   - Kidney Tx Biopsy Hx: No biopsy history.    # Immunosuppression: Tacrolimus immediate release (goal 4-6) and Mycophenolate mofetil (dose 750 mg every 12 hours)   - Induction with Recent Transplant:  Not known due to time from transplant   - Continue with intensive monitoring of immunosuppression for efficacy and toxicity.   - Historical Changes in Immunosuppression: None   - Changes: Not at this time    # Infection Prevention:      - PJP: None      - CMV IgG Ab High Risk Discordance (D+/R-): Yes  CMV Serostatus: Negative  - EBV IgG Ab High Risk Discordance (D+/R-): No  EBV Serostatus: Positive    # Hypertension: Controlled;  Goal BP: < 130/80   - Changes: Not at this time, continue on amlodipine 5 mg daily    # Elevated Blood Glucose: Glucose generally running ~ 100-110's    # Anemia in Chronic Renal Disease: Hgb: Stable      ADITI: No   - Iron studies: Not checked recently    # Mineral Bone Disorder:    - Secondary renal hyperparathyroidism; PTH level: Normal (15-65 pg/ml)        On treatment: None  - Vitamin D; level: Not checked recently, but was normal last check        On supplement: Yes  - Calcium; level: Normal        On supplement: No    # Electrolytes:   - Potassium; level: Normal        On supplement: No  - Bicarbonate; level: Normal        On supplement: No  - Sodium; level: Normal    # Other Significant PMH:   - Thrombocytopenia: Stable platelet count running ~ 's.     - Alopecia: Mostly stable. Felt  likely at least partly due to tacrolimus, but patient isn't interested in changing immunosuppression.     # Skin Cancer Risk:    - Discussed sun protection and recommend regular follow up with Dermatology.    # Transplant History:  Etiology of Kidney Failure: Polycystic kidney disease (PKD)  Tx: LDKT  Transplant: 9/29/2015 (Kidney)  Significant transplant-related complications: None    Transplant Office Phone Number: [phone]    Assessment and plan was discussed with the patient and she voiced her understanding and agreement.    Return visit: No follow-ups on file.    Melisa Elizabeth MD    The longitudinal plan of care for the diagnosis(es)/condition(s) as documented were addressed during this visit. Due to the added complexity in care, I will continue to support Maritza in the subsequent management and with ongoing continuity of care.      Chief Complaint  Ms. Navarro is a 68 year old here for kidney transplant and immunosuppression management.     History of Present Illness   Ms. Navarro reports feeling stable overall. With running dogs, have had some issues with SOB or fatigue and feeling fainted. So getting ECHO in 2 weeks.  Since last clinic visit:   Hospitalizations: No   New Medical Issues: as mentioned above  Chest pain or shortness of breath: No  Lower extremity swelling: No  Weight change: No  Nausea and vomiting: No  Diarrhea: No  Heartburn symptoms: No  Fever, sweats or chills: No  Urinary complaints: No    Home BP:  130's systolic over 70's diastolic      Problem List  Patient Active Problem List   Diagnosis     Polycystic kidney disease     HTN, kidney transplant related     Vitamin D deficiency     Dyslipidemia     Polycystic liver disease     Kidney replaced by transplant     Immunosuppression (H)     Thrombocytopenia (H)     Steroid-induced hyperglycemia     Aftercare following organ transplant     Alopecia     Infection due to 2019 novel coronavirus     Bladder infection     Lung infection        Allergies  Allergies   Allergen Reactions     Contrast Dye Unknown     Mold Unknown       Medications  Current Outpatient Medications   Medication Sig Dispense Refill     amLODIPine (NORVASC) 5 MG tablet Take 1 tablet (5 mg) by mouth at bedtime. 90 tablet 3     mycophenolate (GENERIC EQUIVALENT) 250 MG capsule TAKE 3 CAPSULES BY MOUTH TWICE DAILY :NEEDS APPOINTMENT: 180 capsule 11     tacrolimus (GENERIC EQUIVALENT) 1 MG capsule Take 1 capsule (1 mg) by mouth 2 times daily. 180 capsule 0     Vitamin D, Cholecalciferol, 25 MCG (1000 UT) TABS Take 1 tablet (1,000 Units) by mouth daily 90 tablet 3     No current facility-administered medications for this visit.     There are no discontinued medications.    Physical Exam  Vital Signs: /79 (BP Location: Right arm, Patient Position: Sitting, Cuff Size: Adult Regular)   Pulse 91   Wt 54.5 kg (120 lb 1.6 oz)   SpO2 97%   BMI 23.46 kg/m      GENERAL APPEARANCE: alert and no distress  EYES: eyes grossly normal to inspection  HENT: normal cephalic/atraumatic  RESP: lungs clear to auscultation - no rales, rhonchi or wheezes  CV: regular rhythm, normal rate, no rub, no murmur  EDEMA: no LE edema bilaterally  ABDOMEN: soft, nondistended, nontender, bowel sounds normal  MS: extremities normal - no gross deformities noted, no evidence of inflammation in joints, no muscle tenderness  SKIN: no rash  NEURO: mentation intact and speech normal  PSYCH: mentation appears normal and affect normal/bright  TX KIDNEY: normal    Data        Latest Ref Rng & Units 11/1/2024     9:42 AM 6/20/2024     1:34 PM 7/13/2023     1:10 PM   Renal   Sodium 135 - 145 mmol/L 141  136  139    K 3.4 - 5.3 mmol/L 3.7  4.1  4.1    Cl 98 - 107 mmol/L 102  99  103    Cl (external) 98 - 107 mmol/L 102  99  103    CO2 22 - 29 mmol/L 25  25  28    Urea Nitrogen 8.0 - 23.0 mg/dL 10.9  13.3  15.1    Creatinine 0.51 - 0.95 mg/dL 0.72  0.70  0.76    Glucose 70 - 99 mg/dL 109  119  109    Calcium 8.8 -  10.4 mg/dL 9.6  9.5  9.8          Latest Ref Rng & Units 5/13/2021     9:31 AM 2/19/2020     9:52 AM 9/18/2017     9:19 AM   Bone Health   Vit D Def 20 - 75 ug/L 33  33  34          Latest Ref Rng & Units 11/1/2024     9:42 AM 6/20/2024     1:34 PM 7/13/2023     1:10 PM   Heme   WBC 4.0 - 11.0 10e3/uL 5.4  5.7  5.2    Hgb 11.7 - 15.7 g/dL 14.1  13.6  12.7    Plt 150 - 450 10e3/uL 122  112  109          Latest Ref Rng & Units 4/25/2016     9:08 AM 9/28/2015     7:40 AM 1/26/2015     7:08 AM   Liver   AP 40 - 150 U/L 191  342  209    TBili 0.2 - 1.3 mg/dL 0.5  0.3  0.3    Bilirubin Direct 0.0 - 0.2 mg/dL 0.2      ALT 0 - 50 U/L 36  68  31    AST 0 - 45 U/L 27  45  22    Tot Protein 6.8 - 8.8 g/dL 6.7  6.9  7.0    Albumin 3.4 - 5.0 g/dL 3.5  3.6  3.5          Latest Ref Rng & Units 9/30/2015     6:35 AM   Pancreas   A1C 4.3 - 6.0 % 5.5          Latest Ref Rng & Units 10/8/2015     7:09 AM   Iron studies   Iron 35 - 180 ug/dL 69    Iron Saturation Index 15 - 46 % 28    Ferritin 8 - 252 ng/mL 865          Latest Ref Rng & Units 5/31/2018     9:09 AM 12/27/2017     9:10 AM 9/2/2016     9:02 AM   [facility] Virology   CVM DNA Quant    Plasma    CMV QUANT IU/ML CMVND [IU]/mL   CMV DNA Not Detected   The [employer] AmpliPrep/MADDISON TaqMan CMV Test is an FDA-approved in vitro nucleic   acid amplification test for the quantitation of cytomegalovirus DNA in human   plasma (EDTA plasma) using the [employer] AmpliPrep Instrument for automated viral   nucleic acid extraction and the [employer] TaqMan Analyzer or [employer] TaqMan for   automated Real Time amplification and detection of the viral nucleic acid   target.   Titer results are reported in International Units/mL (IU/mL using 1st WHO   International standard for Human Cytomegalovirus for Nucleic Acid Amplification   based assays. The conversion factor between CMV DNA copis/mL (as defined by the   Roche MADDISON TaqMan CMV test) and International Units is the CMV DNA   concentration in IU/mL x 1.1  copies/IU = CMV DNA in copies/mL.   This assay has received FDA approval for the testing of human plasma only. The   [facility] Laboratory at the [facility], [place], has validated the performance characteristics of the Roche   CMV assay for plasma, bronchial alveolar lavage/wash and urine.      LOG IU/ML OF CMVQNT <2.1 [Log_IU]/mL   Not Calculated    BK Spec  Plasma  Plasma     BK Res BKNEG^BK Virus DNA Not Detected copies/mL BK Virus DNA Not Detected  BK Virus DNA Not Detected     BK Log <2.7 Log copies/mL Not Calculated  Not Calculated     EBV DNA COPIES/ML EBVNEG [Copies]/mL   EBV DNA Not Detected    EBV DNA LOG OF COPIES <2.7 [Log_copies]/mL   Not Calculated   The Real-Time quantitative EBV assay was developed and its performance   characteristics determined by the Infect[facility] Diseases Diagnostic Laboratory at   the [facility] in [place], [place].  The   primers and probes are Analyte Specific Reagents (ASRs) manufactured  by   [employer].   ASRs are used in many laboratory tests necessary for standard medical care and   generally do not require U.S. Food and Drug Administration approval.  The FDA   has determined that such clearance or approval is not necessary.  This test is   used for clinical purposes.  It should not be regarded as investigational or   research.   This laboratory is certified under the Clinical Laboratory Improvement   Amendments of 1988 (CLIA-88) as qualified to perform high complexity clinical   laboratory testing.   The quantitative range of this assay is 500-22,500,00 copies/mL (2.7-7.4 log   copies/mL).  A negative result does not rule out the presence of PCR inhibitors   in the patient specimen or EBV DNA nucleic acid in concentrations below the   level of detection of the assay.  Inhibition may also lead to underestimation   of viral quantitation.        Failed to redirect to the Timeline version of the  REVFS SmartLink.  Recent Labs   Lab Test 07/13/23  1310 06/20/24  1334 11/01/24  0942   DOSTAC 7/13/2023 6/20/2024 10/31/2024   TACROL 5.1 7.2 5.7     Recent Labs   Lab Test 10/17/16  0935   DOSMPA ld 10/16/16 2100   MPACID 5.02*   MPAG 51.7    Prescription drug management  40 minutes spent by me on the date of the encounter doing chart review, history and exam, documentation and further activities per the note      Again, thank you for allowing me to participate in the care of your patient.        Sincerely,        Melisa Elizabeth MD

## 2024-11-15 NOTE — NURSING NOTE
Chief Complaint   Patient presents with    RECHECK      K/P POST RETURN TXP NEPH - Return, Post Transplant Follow up, scheduled per patient         Vitals:    11/15/24 1457 11/15/24 1502   BP: (!) 141/81 131/79   BP Location: Right arm Right arm   Patient Position: Sitting Sitting   Cuff Size: Adult Regular Adult Regular   Pulse: 91    TempSrc: Oral    SpO2: 97%    Weight: 54.5 kg (120 lb 1.6 oz)        BP Readings from Last 3 Encounters:   11/15/24 131/79   11/14/24 136/78   07/13/23 (!) 142/86       /79 (BP Location: Right arm, Patient Position: Sitting, Cuff Size: Adult Regular)   Pulse 91   Wt 54.5 kg (120 lb 1.6 oz)   SpO2 97%   BMI 23.46 kg/m       Eduardo Malhotra, [facility]

## 2024-11-15 NOTE — PROGRESS NOTES
TRANSPLANT NEPHROLOGY CLINIC VISIT     Assessment & Plan   # LDKT: CKD Stage 1 - Stable   - Baseline Creatinine: ~ 0.7-0.9   - Proteinuria: Normal (<0.2 grams)   - DSA Hx: No DSA   - Last cPRA: 0%   - BK Viremia: Not checked recently due to time from transplant   - Kidney Tx Biopsy Hx: No biopsy history.    # Immunosuppression: Tacrolimus immediate release (goal 4-6) and Mycophenolate mofetil (dose 750 mg every 12 hours)   - Induction with Recent Transplant:  Not known due to time from transplant   - Continue with intensive monitoring of immunosuppression for efficacy and toxicity.   - Historical Changes in Immunosuppression: None   - Changes: Not at this time    # Infection Prevention:      - PJP: None      - CMV IgG Ab High Risk Discordance (D+/R-): Yes  CMV Serostatus: Negative  - EBV IgG Ab High Risk Discordance (D+/R-): No  EBV Serostatus: Positive    # Hypertension: Controlled;  Goal BP: < 130/80   - Changes: Not at this time, continue on amlodipine 5 mg daily    # Elevated Blood Glucose: Glucose generally running ~ 100-110's    # Anemia in Chronic Renal Disease: Hgb: Stable      ADITI: No   - Iron studies: Not checked recently    # Mineral Bone Disorder:    - Secondary renal hyperparathyroidism; PTH level: Normal (15-65 pg/ml)        On treatment: None  - Vitamin D; level: Not checked recently, but was normal last check        On supplement: Yes  - Calcium; level: Normal        On supplement: No    # Electrolytes:   - Potassium; level: Normal        On supplement: No  - Bicarbonate; level: Normal        On supplement: No  - Sodium; level: Normal    # Other Significant PMH:   - Thrombocytopenia: Stable platelet count running ~ 's.     - Alopecia: Mostly stable. Felt likely at least partly due to tacrolimus, but patient isn't interested in changing immunosuppression.     # Skin Cancer Risk:    - Discussed sun protection and recommend regular follow up with Dermatology.    # Transplant History:  Etiology of  Kidney Failure: Polycystic kidney disease (PKD)  Tx: LDKT  Transplant: 9/29/2015 (Kidney)  Significant transplant-related complications: None    Transplant Office Phone Number: [phone]    Assessment and plan was discussed with the patient and she voiced her understanding and agreement.    Return visit: No follow-ups on file.    Melisa Elizabeth MD    The longitudinal plan of care for the diagnosis(es)/condition(s) as documented were addressed during this visit. Due to the added complexity in care, I will continue to support Maritza in the subsequent management and with ongoing continuity of care.      Chief Complaint   Ms. Navarro is a 68 year old here for kidney transplant and immunosuppression management.     History of Present Illness    Ms. Navarro reports feeling stable overall. With running dogs, have had some issues with SOB or fatigue and feeling fainted. So getting ECHO in 2 weeks.  Since last clinic visit:   Hospitalizations: No   New Medical Issues: as mentioned above  Chest pain or shortness of breath: No  Lower extremity swelling: No  Weight change: No  Nausea and vomiting: No  Diarrhea: No  Heartburn symptoms: No  Fever, sweats or chills: No  Urinary complaints: No    Home BP:  130's systolic over 70's diastolic      Problem List   Patient Active Problem List   Diagnosis    Polycystic kidney disease    HTN, kidney transplant related    Vitamin D deficiency    Dyslipidemia    Polycystic liver disease    Kidney replaced by transplant    Immunosuppression (H)    Thrombocytopenia (H)    Steroid-induced hyperglycemia    Aftercare following organ transplant    Alopecia    Infection due to 2019 novel coronavirus    Bladder infection    Lung infection       Allergies   Allergies   Allergen Reactions    Contrast Dye Unknown    Mold Unknown       Medications   Current Outpatient Medications   Medication Sig Dispense Refill    amLODIPine (NORVASC) 5 MG tablet Take 1 tablet (5 mg) by mouth at bedtime. 90 tablet  3    mycophenolate (GENERIC EQUIVALENT) 250 MG capsule TAKE 3 CAPSULES BY MOUTH TWICE DAILY :NEEDS APPOINTMENT: 180 capsule 11    tacrolimus (GENERIC EQUIVALENT) 1 MG capsule Take 1 capsule (1 mg) by mouth 2 times daily. 180 capsule 0    Vitamin D, Cholecalciferol, 25 MCG (1000 UT) TABS Take 1 tablet (1,000 Units) by mouth daily 90 tablet 3     No current facility-administered medications for this visit.     There are no discontinued medications.    Physical Exam   Vital Signs: /79 (BP Location: Right arm, Patient Position: Sitting, Cuff Size: Adult Regular)   Pulse 91   Wt 54.5 kg (120 lb 1.6 oz)   SpO2 97%   BMI 23.46 kg/m      GENERAL APPEARANCE: alert and no distress  EYES: eyes grossly normal to inspection  HENT: normal cephalic/atraumatic  RESP: lungs clear to auscultation - no rales, rhonchi or wheezes  CV: regular rhythm, normal rate, no rub, no murmur  EDEMA: no LE edema bilaterally  ABDOMEN: soft, nondistended, nontender, bowel sounds normal  MS: extremities normal - no gross deformities noted, no evidence of inflammation in joints, no muscle tenderness  SKIN: no rash  NEURO: mentation intact and speech normal  PSYCH: mentation appears normal and affect normal/bright  TX KIDNEY: normal    Data         Latest Ref Rng & Units 11/1/2024     9:42 AM 6/20/2024     1:34 PM 7/13/2023     1:10 PM   Renal   Sodium 135 - 145 mmol/L 141  136  139    K 3.4 - 5.3 mmol/L 3.7  4.1  4.1    Cl 98 - 107 mmol/L 102  99  103    Cl (external) 98 - 107 mmol/L 102  99  103    CO2 22 - 29 mmol/L 25  25  28    Urea Nitrogen 8.0 - 23.0 mg/dL 10.9  13.3  15.1    Creatinine 0.51 - 0.95 mg/dL 0.72  0.70  0.76    Glucose 70 - 99 mg/dL 109  119  109    Calcium 8.8 - 10.4 mg/dL 9.6  9.5  9.8          Latest Ref Rng & Units 5/13/2021     9:31 AM 2/19/2020     9:52 AM 9/18/2017     9:19 AM   Bone Health   Vit D Def 20 - 75 ug/L 33  33  34          Latest Ref Rng & Units 11/1/2024     9:42 AM 6/20/2024     1:34 PM 7/13/2023      1:10 PM   Heme   WBC 4.0 - 11.0 10e3/uL 5.4  5.7  5.2    Hgb 11.7 - 15.7 g/dL 14.1  13.6  12.7    Plt 150 - 450 10e3/uL 122  112  109          Latest Ref Rng & Units 4/25/2016     9:08 AM 9/28/2015     7:40 AM 1/26/2015     7:08 AM   Liver   AP 40 - 150 U/L 191  342  209    TBili 0.2 - 1.3 mg/dL 0.5  0.3  0.3    Bilirubin Direct 0.0 - 0.2 mg/dL 0.2      ALT 0 - 50 U/L 36  68  31    AST 0 - 45 U/L 27  45  22    Tot Protein 6.8 - 8.8 g/dL 6.7  6.9  7.0    Albumin 3.4 - 5.0 g/dL 3.5  3.6  3.5          Latest Ref Rng & Units 9/30/2015     6:35 AM   Pancreas   A1C 4.3 - 6.0 % 5.5          Latest Ref Rng & Units 10/8/2015     7:09 AM   Iron studies   Iron 35 - 180 ug/dL 69    Iron Saturation Index 15 - 46 % 28    Ferritin 8 - 252 ng/mL 865          Latest Ref Rng & Units 5/31/2018     9:09 AM 12/27/2017     9:10 AM 9/2/2016     9:02 AM   [facility]   CVM DNA Quant    Plasma    CMV QUANT IU/ML CMVND [IU]/mL   CMV DNA Not Detected   The [employer] AmpliPrep/MADDISON TaqMan CMV Test is an FDA-approved in vitro nucleic   acid amplification test for the quantitation of cytomegalovirus DNA in human   plasma (EDTA plasma) using the [employer] AmpliPrep Instrument for automated viral   nucleic acid extraction and the [employer] TaqMan Analyzer or [employer] TaqMan for   automated Real Time amplification and detection of the viral nucleic acid   target.   Titer results are reported in International Units/mL (IU/mL using 1st WHO   International standard for Human Cytomegalovirus for Nucleic Acid Amplification   based assays. The conversion factor between CMV DNA copis/mL (as defined by the   Roche MADDISON TaqMan CMV test) and International Units is the CMV DNA   concentration in IU/mL x 1.1 copies/IU = CMV DNA in copies/mL.   This assay has received FDA approval for the testing of human plasma only. The   [facility] at the [facility], [place], has validated the performance characteristics  of the Roche   CMV assay for plasma, bronchial alveolar lavage/wash and urine.      LOG IU/ML OF CMVQNT <2.1 [Log_IU]/mL   Not Calculated    BK Spec  Plasma  Plasma     BK Res BKNEG^BK Virus DNA Not Detected copies/mL BK Virus DNA Not Detected  BK Virus DNA Not Detected     BK Log <2.7 Log copies/mL Not Calculated  Not Calculated     EBV DNA COPIES/ML EBVNEG [Copies]/mL   EBV DNA Not Detected    EBV DNA LOG OF COPIES <2.7 [Log_copies]/mL   Not Calculated   The Real-Time quantitative EBV assay was developed and its performance   characteristics determined by the [facility] Diseases Diagnostic Laboratory at   the [facility] in [place], [place].  The   primers and probes are Analyte Specific Reagents (ASRs) manufactured  by   [employer].   ASRs are used in many laboratory tests necessary for standard medical care and   generally do not require U.S. Food and Drug Administration approval.  The FDA   has determined that such clearance or approval is not necessary.  This test is   used for clinical purposes.  It should not be regarded as investigational or   research.   This laboratory is certified under the Clinical Laboratory Improvement   Amendments of 1988 (CLIA-88) as qualified to perform high complexity clinical   laboratory testing.   The quantitative range of this assay is 500-22,500,00 copies/mL (2.7-7.4 log   copies/mL).  A negative result does not rule out the presence of PCR inhibitors   in the patient specimen or EBV DNA nucleic acid in concentrations below the   level of detection of the assay.  Inhibition may also lead to underestimation   of viral quantitation.        Failed to redirect to the Timeline version of the REVFS SmartLink.  Recent Labs   Lab Test 07/13/23  1310 06/20/24  1334 11/01/24  0942   DOSTAC 7/13/2023 6/20/2024 10/31/2024   TACROL 5.1 7.2 5.7     Recent Labs   Lab Test 10/17/16  0935   DOSMPA ld 10/16/16 2100   MPACID 5.02*   MPAG 51.7    Prescription drug  management  40 minutes spent by me on the date of the encounter doing chart review, history and exam, documentation and further activities per the note

## 2024-11-18 ENCOUNTER — ANCILLARY PROCEDURE (OUTPATIENT)
Dept: MAMMOGRAPHY | Facility: HOSPITAL | Age: 69
End: 2024-11-18
Attending: FAMILY MEDICINE
Payer: COMMERCIAL

## 2024-11-18 DIAGNOSIS — Z12.31 VISIT FOR SCREENING MAMMOGRAM: ICD-10-CM

## 2024-11-18 PROCEDURE — 77063 BREAST TOMOSYNTHESIS BI: CPT

## 2024-12-13 ENCOUNTER — HOSPITAL ENCOUNTER (OUTPATIENT)
Dept: CARDIOLOGY | Facility: HOSPITAL | Age: 69
Discharge: HOME | End: 2024-12-13
Attending: FAMILY MEDICINE | Admitting: FAMILY MEDICINE
Payer: COMMERCIAL

## 2024-12-13 DIAGNOSIS — R01.1 HEART MURMUR: ICD-10-CM

## 2024-12-13 PROCEDURE — 93306 TTE W/DOPPLER COMPLETE: CPT

## 2024-12-13 PROCEDURE — 93306 TTE W/DOPPLER COMPLETE: CPT | Mod: 26 | Performed by: INTERNAL MEDICINE

## 2025-05-22 DIAGNOSIS — Z79.60 LONG-TERM USE OF IMMUNOSUPPRESSANT MEDICATION: ICD-10-CM

## 2025-05-22 DIAGNOSIS — I15.1 HYPERTENSION SECONDARY TO OTHER RENAL DISORDERS: ICD-10-CM

## 2025-05-22 DIAGNOSIS — Z94.0 KIDNEY REPLACED BY TRANSPLANT: ICD-10-CM

## 2025-05-22 DIAGNOSIS — Z94.0 KIDNEY TRANSPLANTED: ICD-10-CM

## 2025-05-22 DIAGNOSIS — I10 ESSENTIAL HYPERTENSION WITH GOAL BLOOD PRESSURE LESS THAN 130/80: ICD-10-CM

## 2025-05-22 DIAGNOSIS — Z94.0 KIDNEY TRANSPLANT RECIPIENT: ICD-10-CM

## 2025-05-22 DIAGNOSIS — N25.81 SECONDARY RENAL HYPERPARATHYROIDISM: ICD-10-CM

## 2025-05-22 RX ORDER — TACROLIMUS 1 MG/1
1 CAPSULE ORAL 2 TIMES DAILY
Qty: 180 CAPSULE | Refills: 0 | Status: SHIPPED | OUTPATIENT
Start: 2025-05-22

## 2025-05-22 RX ORDER — MYCOPHENOLATE MOFETIL 250 MG/1
750 CAPSULE ORAL 2 TIMES DAILY
Qty: 540 CAPSULE | Refills: 0 | Status: SHIPPED | OUTPATIENT
Start: 2025-05-22

## 2025-05-22 NOTE — LETTER
"May 22, 2025      Maritza Navarro  [address] [place] [address]            Dear Maritza,    You are overdue for Transplant labs. The last lab result we have was from Nov 1, 2024.   We request your adherence to follow up cares to ensure your best outcomes. You should continue lab work at least every 3 months.     Continuing to prescribe your medications without proper lab follow up leaves you at risk for serious health complications, including rejection, infection, and malignancy (cancer).  You have lab orders in the [facility] system. Any [facility] lab facility is able to access the orders.  Please confirm you received this message.  Let us know if you have any questions.    Leia \"CECELIA\" Dena  Post Kidney / Pancreas Transplant Coordinator  [facility]  T: [phone] option 5                "

## 2025-05-29 ENCOUNTER — LAB (OUTPATIENT)
Dept: LAB | Facility: CLINIC | Age: 70
End: 2025-05-29
Attending: INTERNAL MEDICINE
Payer: COMMERCIAL

## 2025-05-29 DIAGNOSIS — Z94.0 KIDNEY TRANSPLANT RECIPIENT: ICD-10-CM

## 2025-05-29 DIAGNOSIS — Z94.0 KIDNEY TRANSPLANTED: ICD-10-CM

## 2025-05-29 DIAGNOSIS — Z79.60 LONG-TERM USE OF IMMUNOSUPPRESSANT MEDICATION: ICD-10-CM

## 2025-05-29 DIAGNOSIS — Z94.0 KIDNEY REPLACED BY TRANSPLANT: ICD-10-CM

## 2025-05-29 LAB
ANION GAP SERPL CALCULATED.3IONS-SCNC: 10 MMOL/L (ref 7–15)
BUN SERPL-MCNC: 15.7 MG/DL (ref 8–23)
CALCIUM SERPL-MCNC: 9.5 MG/DL (ref 8.8–10.4)
CHLORIDE SERPL-SCNC: 104 MMOL/L (ref 98–107)
CREAT SERPL-MCNC: 0.72 MG/DL (ref 0.51–0.95)
EGFRCR SERPLBLD CKD-EPI 2021: 90 ML/MIN/1.73M2
ERYTHROCYTE [DISTWIDTH] IN BLOOD BY AUTOMATED COUNT: 12.8 % (ref 10–15)
GLUCOSE SERPL-MCNC: 101 MG/DL (ref 70–99)
HCO3 SERPL-SCNC: 25 MMOL/L (ref 22–29)
HCT VFR BLD AUTO: 40.2 % (ref 35–47)
HGB BLD-MCNC: 13.4 G/DL (ref 11.7–15.7)
MCH RBC QN AUTO: 28 PG (ref 26.5–33)
MCHC RBC AUTO-ENTMCNC: 33.3 G/DL (ref 31.5–36.5)
MCV RBC AUTO: 84 FL (ref 78–100)
PLATELET # BLD AUTO: 121 10E3/UL (ref 150–450)
POTASSIUM SERPL-SCNC: 4.5 MMOL/L (ref 3.4–5.3)
RBC # BLD AUTO: 4.79 10E6/UL (ref 3.8–5.2)
SODIUM SERPL-SCNC: 139 MMOL/L (ref 135–145)
WBC # BLD AUTO: 5.3 10E3/UL (ref 4–11)

## 2025-05-30 ENCOUNTER — RESULTS FOLLOW-UP (OUTPATIENT)
Dept: TRANSPLANT | Facility: CLINIC | Age: 70
End: 2025-05-30